# Patient Record
Sex: MALE | Race: WHITE | NOT HISPANIC OR LATINO | Employment: OTHER | URBAN - METROPOLITAN AREA
[De-identification: names, ages, dates, MRNs, and addresses within clinical notes are randomized per-mention and may not be internally consistent; named-entity substitution may affect disease eponyms.]

---

## 2017-01-20 ENCOUNTER — APPOINTMENT (OUTPATIENT)
Dept: LAB | Facility: CLINIC | Age: 67
End: 2017-01-20
Payer: MEDICARE

## 2017-01-20 ENCOUNTER — TRANSCRIBE ORDERS (OUTPATIENT)
Dept: LAB | Facility: CLINIC | Age: 67
End: 2017-01-20

## 2017-01-20 DIAGNOSIS — R73.01 IMPAIRED FASTING GLUCOSE: ICD-10-CM

## 2017-01-20 DIAGNOSIS — I10 ESSENTIAL (PRIMARY) HYPERTENSION: ICD-10-CM

## 2017-01-20 DIAGNOSIS — E78.5 HYPERLIPIDEMIA: ICD-10-CM

## 2017-01-20 LAB
ALBUMIN SERPL BCP-MCNC: 3.7 G/DL (ref 3.5–5)
ALP SERPL-CCNC: 65 U/L (ref 46–116)
ALT SERPL W P-5'-P-CCNC: 72 U/L (ref 12–78)
ANION GAP SERPL CALCULATED.3IONS-SCNC: 9 MMOL/L (ref 4–13)
AST SERPL W P-5'-P-CCNC: 35 U/L (ref 5–45)
BASOPHILS # BLD AUTO: 0.06 THOUSANDS/ΜL (ref 0–0.1)
BASOPHILS NFR BLD AUTO: 1 % (ref 0–1)
BILIRUB DIRECT SERPL-MCNC: 0.2 MG/DL (ref 0–0.2)
BILIRUB SERPL-MCNC: 1.04 MG/DL (ref 0.2–1)
BUN SERPL-MCNC: 13 MG/DL (ref 5–25)
CALCIUM SERPL-MCNC: 8.8 MG/DL (ref 8.3–10.1)
CHLORIDE SERPL-SCNC: 105 MMOL/L (ref 100–108)
CHOLEST SERPL-MCNC: 172 MG/DL (ref 50–200)
CO2 SERPL-SCNC: 23 MMOL/L (ref 21–32)
CREAT SERPL-MCNC: 0.89 MG/DL (ref 0.6–1.3)
EOSINOPHIL # BLD AUTO: 0.32 THOUSAND/ΜL (ref 0–0.61)
EOSINOPHIL NFR BLD AUTO: 6 % (ref 0–6)
ERYTHROCYTE [DISTWIDTH] IN BLOOD BY AUTOMATED COUNT: 12.7 % (ref 11.6–15.1)
EST. AVERAGE GLUCOSE BLD GHB EST-MCNC: 108 MG/DL
GFR SERPL CREATININE-BSD FRML MDRD: >60 ML/MIN/1.73SQ M
GLUCOSE SERPL-MCNC: 93 MG/DL (ref 65–140)
HBA1C MFR BLD: 5.4 % (ref 4.2–6.3)
HCT VFR BLD AUTO: 46.4 % (ref 36.5–49.3)
HDLC SERPL-MCNC: 44 MG/DL (ref 40–60)
HGB BLD-MCNC: 16.8 G/DL (ref 12–17)
LDLC SERPL CALC-MCNC: 98 MG/DL (ref 0–100)
LYMPHOCYTES # BLD AUTO: 1.55 THOUSANDS/ΜL (ref 0.6–4.47)
LYMPHOCYTES NFR BLD AUTO: 28 % (ref 14–44)
MCH RBC QN AUTO: 32.2 PG (ref 26.8–34.3)
MCHC RBC AUTO-ENTMCNC: 36.2 G/DL (ref 31.4–37.4)
MCV RBC AUTO: 89 FL (ref 82–98)
MONOCYTES # BLD AUTO: 0.79 THOUSAND/ΜL (ref 0.17–1.22)
MONOCYTES NFR BLD AUTO: 14 % (ref 4–12)
NEUTROPHILS # BLD AUTO: 2.76 THOUSANDS/ΜL (ref 1.85–7.62)
NEUTS SEG NFR BLD AUTO: 51 % (ref 43–75)
NRBC BLD AUTO-RTO: 0 /100 WBCS
PLATELET # BLD AUTO: 181 THOUSANDS/UL (ref 149–390)
PMV BLD AUTO: 10.8 FL (ref 8.9–12.7)
POTASSIUM SERPL-SCNC: 4.1 MMOL/L (ref 3.5–5.3)
PROT SERPL-MCNC: 7.3 G/DL (ref 6.4–8.2)
RBC # BLD AUTO: 5.22 MILLION/UL (ref 3.88–5.62)
SODIUM SERPL-SCNC: 137 MMOL/L (ref 136–145)
TRIGL SERPL-MCNC: 151 MG/DL
WBC # BLD AUTO: 5.49 THOUSAND/UL (ref 4.31–10.16)

## 2017-01-20 PROCEDURE — 80048 BASIC METABOLIC PNL TOTAL CA: CPT

## 2017-01-20 PROCEDURE — 80061 LIPID PANEL: CPT

## 2017-01-20 PROCEDURE — 85025 COMPLETE CBC W/AUTO DIFF WBC: CPT

## 2017-01-20 PROCEDURE — 36415 COLL VENOUS BLD VENIPUNCTURE: CPT

## 2017-01-20 PROCEDURE — 83036 HEMOGLOBIN GLYCOSYLATED A1C: CPT

## 2017-01-20 PROCEDURE — 80076 HEPATIC FUNCTION PANEL: CPT

## 2017-01-24 ENCOUNTER — ALLSCRIPTS OFFICE VISIT (OUTPATIENT)
Dept: OTHER | Facility: OTHER | Age: 67
End: 2017-01-24

## 2017-01-24 DIAGNOSIS — R31.0 GROSS HEMATURIA: ICD-10-CM

## 2017-01-24 DIAGNOSIS — L98.9 DISORDER OF SKIN OR SUBCUTANEOUS TISSUE: ICD-10-CM

## 2017-02-03 ENCOUNTER — HOSPITAL ENCOUNTER (OUTPATIENT)
Dept: ULTRASOUND IMAGING | Facility: HOSPITAL | Age: 67
Discharge: HOME | End: 2017-02-03
Attending: INTERNAL MEDICINE
Payer: MEDICARE

## 2017-02-03 DIAGNOSIS — R31.0 GROSS HEMATURIA: ICD-10-CM

## 2017-02-03 PROCEDURE — 76770 US EXAM ABDO BACK WALL COMP: CPT

## 2017-02-07 ENCOUNTER — ALLSCRIPTS OFFICE VISIT (OUTPATIENT)
Dept: OTHER | Facility: OTHER | Age: 67
End: 2017-02-07

## 2017-02-14 ENCOUNTER — APPOINTMENT (OUTPATIENT)
Dept: LAB | Facility: CLINIC | Age: 67
End: 2017-02-14
Payer: MEDICARE

## 2017-02-14 DIAGNOSIS — R31.0 GROSS HEMATURIA: ICD-10-CM

## 2017-02-14 LAB
BILIRUB UR QL STRIP: NEGATIVE
CLARITY UR: CLEAR
COLOR UR: YELLOW
GLUCOSE UR STRIP-MCNC: NEGATIVE MG/DL
HGB UR QL STRIP.AUTO: NEGATIVE
KETONES UR STRIP-MCNC: NEGATIVE MG/DL
LEUKOCYTE ESTERASE UR QL STRIP: NEGATIVE
NITRITE UR QL STRIP: NEGATIVE
PH UR STRIP.AUTO: 6 [PH] (ref 4.5–8)
PROT UR STRIP-MCNC: NEGATIVE MG/DL
SP GR UR STRIP.AUTO: 1.01 (ref 1–1.03)
UROBILINOGEN UR QL STRIP.AUTO: 0.2 E.U./DL

## 2017-02-14 PROCEDURE — 81003 URINALYSIS AUTO W/O SCOPE: CPT

## 2017-03-13 ENCOUNTER — ALLSCRIPTS OFFICE VISIT (OUTPATIENT)
Dept: OTHER | Facility: OTHER | Age: 67
End: 2017-03-13

## 2017-07-01 DIAGNOSIS — E78.5 HYPERLIPIDEMIA: ICD-10-CM

## 2017-07-01 DIAGNOSIS — R73.01 IMPAIRED FASTING GLUCOSE: ICD-10-CM

## 2017-07-01 DIAGNOSIS — I10 ESSENTIAL (PRIMARY) HYPERTENSION: ICD-10-CM

## 2017-07-07 ENCOUNTER — APPOINTMENT (OUTPATIENT)
Dept: LAB | Facility: CLINIC | Age: 67
End: 2017-07-07
Payer: MEDICARE

## 2017-07-07 ENCOUNTER — TRANSCRIBE ORDERS (OUTPATIENT)
Dept: LAB | Facility: CLINIC | Age: 67
End: 2017-07-07

## 2017-07-07 DIAGNOSIS — I10 ESSENTIAL (PRIMARY) HYPERTENSION: ICD-10-CM

## 2017-07-07 DIAGNOSIS — E78.5 HYPERLIPIDEMIA: ICD-10-CM

## 2017-07-07 DIAGNOSIS — R73.01 IMPAIRED FASTING GLUCOSE: ICD-10-CM

## 2017-07-07 LAB
ALBUMIN SERPL BCP-MCNC: 3.5 G/DL (ref 3.5–5)
ALP SERPL-CCNC: 65 U/L (ref 46–116)
ALT SERPL W P-5'-P-CCNC: 60 U/L (ref 12–78)
ANION GAP SERPL CALCULATED.3IONS-SCNC: 4 MMOL/L (ref 4–13)
AST SERPL W P-5'-P-CCNC: 38 U/L (ref 5–45)
BASOPHILS # BLD AUTO: 0.05 THOUSANDS/ΜL (ref 0–0.1)
BASOPHILS NFR BLD AUTO: 1 % (ref 0–1)
BILIRUB DIRECT SERPL-MCNC: 0.31 MG/DL (ref 0–0.2)
BILIRUB SERPL-MCNC: 1.25 MG/DL (ref 0.2–1)
BUN SERPL-MCNC: 13 MG/DL (ref 5–25)
CALCIUM SERPL-MCNC: 8.6 MG/DL (ref 8.3–10.1)
CHLORIDE SERPL-SCNC: 108 MMOL/L (ref 100–108)
CHOLEST SERPL-MCNC: 148 MG/DL (ref 50–200)
CO2 SERPL-SCNC: 26 MMOL/L (ref 21–32)
CREAT SERPL-MCNC: 0.89 MG/DL (ref 0.6–1.3)
EOSINOPHIL # BLD AUTO: 0.44 THOUSAND/ΜL (ref 0–0.61)
EOSINOPHIL NFR BLD AUTO: 8 % (ref 0–6)
ERYTHROCYTE [DISTWIDTH] IN BLOOD BY AUTOMATED COUNT: 12.9 % (ref 11.6–15.1)
EST. AVERAGE GLUCOSE BLD GHB EST-MCNC: 103 MG/DL
GFR SERPL CREATININE-BSD FRML MDRD: >60 ML/MIN/1.73SQ M
GLUCOSE P FAST SERPL-MCNC: 88 MG/DL (ref 65–99)
HBA1C MFR BLD: 5.2 % (ref 4.2–6.3)
HCT VFR BLD AUTO: 44.8 % (ref 36.5–49.3)
HDLC SERPL-MCNC: 44 MG/DL (ref 40–60)
HGB BLD-MCNC: 16.1 G/DL (ref 12–17)
LDLC SERPL CALC-MCNC: 81 MG/DL (ref 0–100)
LYMPHOCYTES # BLD AUTO: 1.8 THOUSANDS/ΜL (ref 0.6–4.47)
LYMPHOCYTES NFR BLD AUTO: 34 % (ref 14–44)
MCH RBC QN AUTO: 31.9 PG (ref 26.8–34.3)
MCHC RBC AUTO-ENTMCNC: 35.9 G/DL (ref 31.4–37.4)
MCV RBC AUTO: 89 FL (ref 82–98)
MONOCYTES # BLD AUTO: 0.71 THOUSAND/ΜL (ref 0.17–1.22)
MONOCYTES NFR BLD AUTO: 13 % (ref 4–12)
NEUTROPHILS # BLD AUTO: 2.34 THOUSANDS/ΜL (ref 1.85–7.62)
NEUTS SEG NFR BLD AUTO: 44 % (ref 43–75)
NRBC BLD AUTO-RTO: 0 /100 WBCS
PLATELET # BLD AUTO: 149 THOUSANDS/UL (ref 149–390)
PMV BLD AUTO: 10 FL (ref 8.9–12.7)
POTASSIUM SERPL-SCNC: 4.4 MMOL/L (ref 3.5–5.3)
PROT SERPL-MCNC: 6.7 G/DL (ref 6.4–8.2)
RBC # BLD AUTO: 5.04 MILLION/UL (ref 3.88–5.62)
SODIUM SERPL-SCNC: 138 MMOL/L (ref 136–145)
TRIGL SERPL-MCNC: 114 MG/DL
WBC # BLD AUTO: 5.35 THOUSAND/UL (ref 4.31–10.16)

## 2017-07-07 PROCEDURE — 80061 LIPID PANEL: CPT

## 2017-07-07 PROCEDURE — 80076 HEPATIC FUNCTION PANEL: CPT

## 2017-07-07 PROCEDURE — 83036 HEMOGLOBIN GLYCOSYLATED A1C: CPT

## 2017-07-07 PROCEDURE — 80048 BASIC METABOLIC PNL TOTAL CA: CPT

## 2017-07-07 PROCEDURE — 36415 COLL VENOUS BLD VENIPUNCTURE: CPT

## 2017-07-07 PROCEDURE — 85025 COMPLETE CBC W/AUTO DIFF WBC: CPT

## 2017-07-10 ENCOUNTER — ALLSCRIPTS OFFICE VISIT (OUTPATIENT)
Dept: OTHER | Facility: OTHER | Age: 67
End: 2017-07-10

## 2018-01-01 DIAGNOSIS — E78.5 HYPERLIPIDEMIA: ICD-10-CM

## 2018-01-01 DIAGNOSIS — I10 ESSENTIAL (PRIMARY) HYPERTENSION: ICD-10-CM

## 2018-01-01 DIAGNOSIS — Z12.5 ENCOUNTER FOR SCREENING FOR MALIGNANT NEOPLASM OF PROSTATE: ICD-10-CM

## 2018-01-01 DIAGNOSIS — R73.01 IMPAIRED FASTING GLUCOSE: ICD-10-CM

## 2018-01-09 NOTE — PROGRESS NOTES
Assessment    1  Seborrheic keratosis (702 19) (L82 1)   2  Screening for skin condition (V82 0) (Z13 89)   3  H/O nonmelanoma skin cancer (V10 83) (Z85 828)    Plan    · Follow-up visit in 1 year Evaluation and Treatment  Follow-up  Status: Hold For -  Scheduling  Requested for: 49VTL1221    · Use a sun block product with an SPF of 15 or more ; Status:Complete;   Done:  08SRD4339 03:30PM    Discussion/Summary  Discussion Summary- [facility]:   Assessment #1: Seborrheic keratosis  Care Plan:   Patient reassured these are normal growths we acquire with age no treatment needed  Assessment #2: History skin cancer  Care Plan:   No recurrence nothing else atypical noted sunblock recommended followup in 1 year  Assessment #3: Screening for dermatologic disorders  Care Plan:   Nothing else noted on complete exam followup in 1 year  Chief Complaint  Chief Complaint Free Text Note Form: 6 MONTH SKIN CANCER EXAM      History of Present Illness  HPI: 71-year-old male presents for followup concerns regarding previous history of skin cancer No specific cutaneous problems noted      Review of Systems  Complete Male Dermatology [facility] Patient:   Constitutional: Denies constitutional symptoms  Eyes: Denies eye symptoms  ENT:  denies ear symptoms, nasal symptoms, mouth or throat symptoms  Cardiovascular: Denies cardiovascular symptoms  Respiratory: Denies respiratory symptoms  Gastrointestinal: Denies gastrointestinal symptoms  Musculoskeletal: Denies musculoskeletal symptoms  Integumentary: Denies skin, hair and nail symptoms  Neurological: Denies neurologic symptoms  Psychiatric: Denies psychiatric symptoms  Endocrine: Denies endocrine symptoms  Hematologic/Lymphatic: Denies hematologic symptoms  Active Problems    1  Actinic keratosis (702 0) (L57 0)   2  Atherosclerotic heart disease of native coronary artery without angina pectoris (414 01)   (I25 10)   3   Coronary arteriosclerosis (414 00) (I25 10)   4  Encounter for monitoring antiplatelet therapy (V91 18,E10 60) (Z51 81,Z79 02)   5  Esophageal reflux (530 81) (K21 9)   6  Essential hypertension (401 9) (I10)   7  Hand numbness (782 0) (R20 0)   8  Hyperlipidemia (272 4) (E78 5)   9  Impaired fasting glucose (790 21) (R73 01)   10  Leg cramping (729 82) (R25 2)   11  Mitral valve disorder (424 0) (I05 9)   12  Need for influenza vaccination (V04 81) (Z23)   13  Need for zoster vaccine (V04 89) (Z23)   14  Prostate cancer screening (V76 44) (Z12 5)   15  Screening for skin condition (V82 0) (Z13 89)   16  Seborrheic keratosis (702 19) (L82 1)   17  Secondary polycythemia (289 0) (D75 1)   18  Syncope (780 2) (R55)    Past Medical History    1  History of Abnormal Liver Function Test (790 6)   2  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)   3  History of Encounter for screening for malignant neoplasm of colon (V76 51) (Z12 11)   4  H/O nonmelanoma skin cancer (V10 83) (Z85 828)   5  History of angina pectoris (V12 59) (Z86 79)   6  History of benign neoplasm of skin (V13 3) (Z87 2)   7  History of sebaceous cyst (V13 3) (Z87 2)   8  History of shortness of breath (V13 89) (Z87 898)   9  History of Lumbar Canal Stenosis With Neurogenic Claudication (724 03)   10  History of Need for influenza vaccination (V04 81) (Z23)   11  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) (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 0)   12  Old myocardial infarction (412) (I25 2)   13  History of Skin Cancer (V10 83)   14  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 (173 52)  Past Medical History Reviewed- Derm:   The past medical history was reviewed  Surgical History    1  History of Cardiac Cath Procedure Outcome: Successful   2  History of Cath Atherectomy 1 With Stent Placement   3  History of Cath Stent Placement   4  History of Excision Of Lesion Trunk Malignant   5   History of PTCA  Surgical History Reviewed [facility]: Surgical History reviewed      Family History    1  Family history of Acute Myocardial Infarction (V17 3)   2  Family history of Father  At Age 59    4  Family history of coronary artery disease (V17 3) (Z82 49)  Family History Reviewed- Derm:   Family History was reviewed      Social History    · Denied: History of Drug Use   · Home DME CPAP   · Living Independently With Spouse   · Never A Smoker   · Occupation: Retired   · Wine Consumption (___ Glasses/Day)  Social History [facility]ed [facility]: The social history was reviewed      Current Meds   1  Aspirin 81 MG Oral Tablet; Therapy: (Recorded:2015) to Recorded   2  Atorvastatin Calcium 20 MG Oral Tablet; take 1 tablet every day; Therapy: 70VZQ5188 to (Eladio Parks)  Requested for: 61NPA2078; Last   Rx:2015 Ordered   3  Clopidogrel Bisulfate 75 MG Oral Tablet; take 1 tablet every day; Therapy: 41IIV1915 to (Evaluate:2016)  Requested for: 45RJK2476; Last   Rx:2015 Ordered   4  Lisinopril 10 MG Oral Tablet; 1 tablet daily; Therapy: 83SYF4355 to (Abel Sales)  Requested for: 88WPL8976; Last   Rx:59Ouq3118 Ordered   5  Metoprolol Succinate ER 50 MG Oral Tablet Extended Release 24 Hour; take 1 tablet by   mouth every day; Therapy: 04Atk6102 to (Evaluate:95Qij9026)  Requested for: 69Vwo4414; Last   Rx:45Xjd5353 Ordered   6  Nitrostat 0 4 MG Sublingual Tablet Sublingual; DISSOLVE 1 TABLET UNDER THE   TONGUE AS NEEDED FOR CHEST PAIN;   Therapy: 90GFR3811 to (Evaluate:26Gtl7059)  Requested for: 26YUT7948; Last   Rx:2015 Ordered   7  Tylenol Extra Strength 500 MG Oral Tablet; TAKE TABLET  PRN; Therapy: 16Zug6960 to Recorded   8  Zostavax 69918 UNT/0 65ML Subcutaneous Solution Reconstituted; Inject one dose; Therapy: 09YHC6206 to (Last AL:89YIT3401) Ordered  Medication List Reviewed: The medication list was reviewed and updated today  Allergies    1   No Known Drug Allergies    Physical Exam    Constitutional   General appearance: Appears healthy and well developed  Lymphatic   No visible disturbance  Musculoskeletal   Digits and nails: No clubbing, cyanosis or edema  Cutaneous and nail exam normal     Skin   Scalp skin texture and hair distribution: Normal skin texture on scalp, normal hair distribution  Head: Normal turgor, no rashes, no lesions  Neck: Normal turgor, no rashes, no lesions  Chest: Normal turgor, no rashes, no lesions  Abdomen: Normal turgor, no rashes, no lesions  Back: Normal turgor, no rashes, no lesions  Right upper extremity: Normal turgor, no rashes, no lesions  Left upper extremity: Normal turgor, no rashes, no lesions  Right lower extremity: Normal turgor, no rashes, no lesions  Left lower extremity: Normal turgor, no rashes, no lesions  Neuro/Psych   Alert and oriented x 3  Displays comfort and cooperation during encounterl  Affect is normal     Finding normal keratotic papules with greasy stuck on appearance nothing else atypical noted on complete cutaneous exam previous sites of skin cancer well-healed without recurrence  Health Management  Health Maintenance   COLONOSCOPY; every 10 years; Next Due: 87CBR9049; Overdue  Influenza (Split); every 1 year; Last 29HKV2002; Next Due: 84RRB2245; Overdue    Future Appointments    Date/Time Provider Specialty Site   04/11/2016 03:00 PM JERSEY Lyman  Cardiology [facility]   05/31/2016 03:00 PM JERSEY Moctezuma   Internal Medicine [facility]  2  CT     Signatures   Electronically signed by : JERSEY Bloom ; Feb  3 2016  3:41PM EST                       (Author)

## 2018-01-10 NOTE — RESULT NOTES
Verified Results  ECHO STRESS TEST W CONTRAST IF INDICATED 2016 10:49AM Cinthia Clarke Order Number: [id number]     Test Name Result Flag Reference   ECHO STRESS TEST W CONTRAST IF INDICATED (Report)     Donna [address]   [phone]     Exercise Stress Echocardiography     Study date: 2016     Patient: Padmini Jefferson   MR number: [id number]   Account number: [de-identified]   : 1950   Age: 60 years   Gender: Male   Study date: 2016   Status: Outpatient   [facility]   Height: 70 in   Weight: 235 lb   BP: 134// 98 mmHg     Indications: Evaluation of known coronary artery disease  Diagnosis: I25 10 - Atherosclerotic heart disease of native coronary artery   without angina pectoris, R06 09 - Other forms of dyspnea     Sonographer: Elizabeth RCS   Primary Physician: Yazan Estevez MD   Referring Physician: Onel Romero MD   Group: [facility]   Other: Jazzy Lemus MS, CCT   Interpreting Physician: Onel Romero MD     IMPRESSIONS:   Normal study  Left ventricular systolic function was normal      SUMMARY     STRESS RESULTS:   Duration of exercise was 9 min  Maximal work rate was 9 METs  Maximal heart rate during stress was 140 bpm ( 90 % of maximal predicted heart   rate)  Target heart rate was achieved  There was no chest pain during stress  ECG CONCLUSIONS:   The stress ECG was negative for ischemia  BASELINE:   There were no regional wall motion abnormalities  Estimated left ventricular ejection fraction was 55 %   PEAK STRESS:   There were no regional wall motion abnormalities  ECHO CONCLUSIONS:   There was no diagnostic evidence for stress-induced ischemia  HISTORY: The patient is a 60year old  male  Chest pain status: no   chest pain  Other symptoms: dyspnea   Coronary artery disease risk factors: dyslipidemia, hypertension, and family history of coronary artery disease  Cardiovascular history: coronary artery disease and prior myocardial infarction   (April 2000 and Jan 2007) Prior cardiovascular procedures: angioplasty of left   anterior descending and first diagonal (date unknown) and angioplasty of right   coronary artery (on 4/26/10)  Co-morbidity: obesity  GERD, ERIC Medications: a   beta blocker, an ACE inhibitor/ARB, clopidogrel, and a lipid lowering agent  PHYSICAL EXAM: Baseline physical exam screening: normal      REST ECG: Normal sinus rhythm  Normal baseline ECG  PROCEDURE: The study was performed in the stress lab  The study was performed   in the [address] [place] [address]  Treadmill exercise testing was   performed, using the Francisca protocol  Stress and rest echocardiographic   evaluation with 2D imaging was performed from multiple acoustic windows for   evaluation of ventricular function  FRANCISCA PROTOCOL:   HR bpm SBP mmHg DBP mmHg Symptoms ST change Rhythm/conduct   Baseline 59 134 98 none nonspecific ST abnormality NSR, rare PVC's   Stage 1 93 148 86 none none NSR, rare PVC's   Stage 2 115 152 86 mild dyspnea, mild fatigue none NSR, rare PAC's, rare PVC's   Stage 3 140 190 82 moderate dyspnea, moderate fatigue none NSR, rare PAC's,   frequent PVC's   Immediate 140 -- -- same as above none NSR, no ectopy   Recovery 1 107 -- -- subsiding upsloping depression, less than 0 5 mm in II,   III, aVF, V4, V5, V6 NSR, occasional PAC's, rare PVC's   Recovery 2 77 164 94 none same as above NSR, rare PAC's   Recovery 3 75 -- -- none same as above NSR, rare PAC's, rare PVC's   Recovery 5 80 152 94 none upsloping depression, less than 0 5 mm in II NSR,   occasional PVC's     STRESS RESULTS: Duration of exercise was 9 min  The patient exercised to   protocol stage 3  Maximal work rate was 9 METs  Maximal heart rate during   stress was 140 bpm ( 90 % of maximal predicted heart rate)   Target heart rate   was achieved  Maximal systolic blood pressure during stress was 190 mmHg  The   rate-pressure product for the peak heart rate and blood pressure was 96708  There was no chest pain during stress  The stress test was terminated due to   achievement of target heart rate, PVCs, moderate dyspnea, and moderate fatigue  ECG CONCLUSIONS: The stress ECG was negative for ischemia  Arrhythmia during   stress: isolated atrial premature beats and isolated premature ventricular   beats  STRESS 2D ECHO RESULTS:     BASELINE: There were no regional wall motion abnormalities  Left ventricular   size was normal  Overall left ventricular systolic function was normal    Estimated left ventricular ejection fraction was 55 %   PEAK STRESS: There were no regional wall motion abnormalities  There was an   appropriate reduction in left ventricular size  There was an appropriate   augmentation in LV function  ECHO CONCLUSIONS: There was no diagnostic evidence for stress-induced ischemia  The image quality was poor       Prepared and electronically signed by     Gio Zimmerman MD   Signed 26-Apr-2016 17:15:07

## 2018-01-11 ENCOUNTER — ALLSCRIPTS OFFICE VISIT (OUTPATIENT)
Dept: OTHER | Facility: OTHER | Age: 68
End: 2018-01-11

## 2018-01-11 ENCOUNTER — APPOINTMENT (OUTPATIENT)
Dept: LAB | Facility: CLINIC | Age: 68
End: 2018-01-11
Payer: MEDICARE

## 2018-01-11 DIAGNOSIS — I10 ESSENTIAL (PRIMARY) HYPERTENSION: ICD-10-CM

## 2018-01-11 DIAGNOSIS — Z12.5 ENCOUNTER FOR SCREENING FOR MALIGNANT NEOPLASM OF PROSTATE: ICD-10-CM

## 2018-01-11 DIAGNOSIS — R73.01 IMPAIRED FASTING GLUCOSE: ICD-10-CM

## 2018-01-11 DIAGNOSIS — E78.5 HYPERLIPIDEMIA: ICD-10-CM

## 2018-01-11 LAB
ALBUMIN SERPL BCP-MCNC: 3.3 G/DL (ref 3.5–5)
ALP SERPL-CCNC: 61 U/L (ref 46–116)
ALT SERPL W P-5'-P-CCNC: 36 U/L (ref 12–78)
ANION GAP SERPL CALCULATED.3IONS-SCNC: 6 MMOL/L (ref 4–13)
AST SERPL W P-5'-P-CCNC: 27 U/L (ref 5–45)
BASOPHILS # BLD AUTO: 0.06 THOUSANDS/ΜL (ref 0–0.1)
BASOPHILS NFR BLD AUTO: 1 % (ref 0–1)
BILIRUB DIRECT SERPL-MCNC: 0.18 MG/DL (ref 0–0.2)
BILIRUB SERPL-MCNC: 0.75 MG/DL (ref 0.2–1)
BUN SERPL-MCNC: 17 MG/DL (ref 5–25)
CALCIUM SERPL-MCNC: 8.7 MG/DL (ref 8.3–10.1)
CHLORIDE SERPL-SCNC: 107 MMOL/L (ref 100–108)
CHOLEST SERPL-MCNC: 119 MG/DL (ref 50–200)
CO2 SERPL-SCNC: 24 MMOL/L (ref 21–32)
CREAT SERPL-MCNC: 0.81 MG/DL (ref 0.6–1.3)
EOSINOPHIL # BLD AUTO: 0.22 THOUSAND/ΜL (ref 0–0.61)
EOSINOPHIL NFR BLD AUTO: 4 % (ref 0–6)
ERYTHROCYTE [DISTWIDTH] IN BLOOD BY AUTOMATED COUNT: 12.1 % (ref 11.6–15.1)
EST. AVERAGE GLUCOSE BLD GHB EST-MCNC: 105 MG/DL
GFR SERPL CREATININE-BSD FRML MDRD: 93 ML/MIN/1.73SQ M
GLUCOSE P FAST SERPL-MCNC: 102 MG/DL (ref 65–99)
HBA1C MFR BLD: 5.3 % (ref 4.2–6.3)
HCT VFR BLD AUTO: 43.7 % (ref 36.5–49.3)
HDLC SERPL-MCNC: 37 MG/DL (ref 40–60)
HGB BLD-MCNC: 15.9 G/DL (ref 12–17)
LDLC SERPL CALC-MCNC: 64 MG/DL (ref 0–100)
LYMPHOCYTES # BLD AUTO: 1.62 THOUSANDS/ΜL (ref 0.6–4.47)
LYMPHOCYTES NFR BLD AUTO: 28 % (ref 14–44)
MCH RBC QN AUTO: 32.1 PG (ref 26.8–34.3)
MCHC RBC AUTO-ENTMCNC: 36.4 G/DL (ref 31.4–37.4)
MCV RBC AUTO: 88 FL (ref 82–98)
MONOCYTES # BLD AUTO: 0.74 THOUSAND/ΜL (ref 0.17–1.22)
MONOCYTES NFR BLD AUTO: 13 % (ref 4–12)
NEUTROPHILS # BLD AUTO: 3.22 THOUSANDS/ΜL (ref 1.85–7.62)
NEUTS SEG NFR BLD AUTO: 54 % (ref 43–75)
NRBC BLD AUTO-RTO: 0 /100 WBCS
PLATELET # BLD AUTO: 278 THOUSANDS/UL (ref 149–390)
PMV BLD AUTO: 9.4 FL (ref 8.9–12.7)
POTASSIUM SERPL-SCNC: 4.2 MMOL/L (ref 3.5–5.3)
PROT SERPL-MCNC: 7.3 G/DL (ref 6.4–8.2)
PSA SERPL-MCNC: 0.8 NG/ML (ref 0–4)
RBC # BLD AUTO: 4.96 MILLION/UL (ref 3.88–5.62)
SODIUM SERPL-SCNC: 137 MMOL/L (ref 136–145)
TRIGL SERPL-MCNC: 91 MG/DL
WBC # BLD AUTO: 5.89 THOUSAND/UL (ref 4.31–10.16)

## 2018-01-11 PROCEDURE — G0103 PSA SCREENING: HCPCS

## 2018-01-11 PROCEDURE — 83036 HEMOGLOBIN GLYCOSYLATED A1C: CPT

## 2018-01-11 PROCEDURE — 36415 COLL VENOUS BLD VENIPUNCTURE: CPT

## 2018-01-11 PROCEDURE — 80048 BASIC METABOLIC PNL TOTAL CA: CPT

## 2018-01-11 PROCEDURE — 80076 HEPATIC FUNCTION PANEL: CPT

## 2018-01-11 PROCEDURE — 80061 LIPID PANEL: CPT

## 2018-01-11 PROCEDURE — 85025 COMPLETE CBC W/AUTO DIFF WBC: CPT

## 2018-01-12 NOTE — PROGRESS NOTES
Assessment   1  Actinic keratosis (702 0) (L57 0)   2  Screening for skin condition (V82 0) (Z13 89)   3  Seborrheic keratosis (702 19) (L82 1)   4  H/O nonmelanoma skin cancer (V10 83) (Z85 828)    Plan    · Wound care as instructed ; Status:Complete;   Done: 93GHK1903   · Follow-up visit in 6 months Evaluation and Treatment  Follow-up  Status: Hold For -    Scheduling  Requested for: 41PYY6187   · Use a sun block product with an SPF of 15 or more ; Status:Complete;   Done:    22ITG7321    Discussion/Summary   Discussion Summary- [facility]:      Assessment #1: Seborrheic keratosis  Care Plan:      Patient reassured these are normal growths we acquire with age no treatment needed  Assessment #2: History skin cancer  Care Plan:      No recurrence nothing else atypical noted sunblock recommended followup in 6 months  Assessment #3: Screening for dermatologic disorders  Care Plan:      Nothing else noted on complete exam followup in 6 months  Assessment #4: Actinic keratosis  Care Plan:      Patient advised lesions are precancers should resolve with cryosurgery if not to let us know sunblock recommended  Chief Complaint   Chief Complaint Free Text Note Form: yearly full body skin cancer exam      History of Present Illness   HPI: 78 y o male presents for follow-up for previous history of skin cancer as well as actinic keratosis notes some scaling areas on his face  No other specific concerns noted      Review of Systems   Complete Male Dermatology [facility] Patient:      Constitutional: Denies constitutional symptoms  Eyes: Denies eye symptoms  ENT:  denies ear symptoms, nasal symptoms, mouth or throat symptoms  Cardiovascular: Denies cardiovascular symptoms  Respiratory: Denies respiratory symptoms  Gastrointestinal: Denies gastrointestinal symptoms  Musculoskeletal: Denies musculoskeletal symptoms        Integumentary: Denies skin, hair and nail symptoms  Neurological: Denies neurologic symptoms  Psychiatric: Denies psychiatric symptoms  Endocrine: Denies endocrine symptoms  Hematologic/Lymphatic: Denies hematologic symptoms  Active Problems   1  Actinic keratosis (702 0) (L57 0)   2  Advanced directives, counseling/discussion (V65 49) (Z71 89)   3  Atherosclerotic heart disease of native coronary artery without angina pectoris (414 01)     (I25 10)   4  Changing skin lesion (709 9) (L98 9)   5  Coronary arteriosclerosis (414 00) (I25 10)   6  Dyspnea on effort (786 09) (R06 09)   7  Elevated bilirubin (277 4) (R17)   8  Encounter for monitoring antiplatelet therapy (T73 23,G55 92) (Z51 81,Z79 02)   9  Essential hypertension (401 9) (I10)   10  GERD without esophagitis (530 81) (K21 9)   11  Hand numbness (782 0) (R20 0)   12  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)   13  Hyperlipidemia (272 4) (E78 5)   14  Impaired fasting glucose (790 21) (R73 01)   15  Lateral epicondylitis of left elbow (726 32) (M77 12)   16  Leg cramping (729 82) (R25 2)   17  Mitral valve disorder (424 0) (I05 9)   18  Need for influenza vaccination (V04 81) (Z23)   19  Need for pneumococcal vaccine (V03 82) (Z23)   20  Need for zoster vaccine (V04 89) (Z23)   21  No advance directives (V49 89) (Z78 9)   22  Prostate cancer screening (V76 44) (Z12 5)   23  Screening for genitourinary condition (V81 6) (Z13 89)   24  Screening for skin condition (V82 0) (Z13 89)   25  Seborrheic keratosis (702 19) (L82 1)   26  Secondary polycythemia (289 0) (D75 1)   27  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)   28  Squamous cell carcinoma in situ of skin of left cheek (232 3) (D04 39)   29  Squamous cell carcinoma of skin of neck (173 42) (C44 42)   30  Wellness examination (V70 0) (Z00 00)    Past Medical History   1  History of Abnormal Liver Function Test (790 6)   2  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)   3   History of Encounter for screening for malignant neoplasm of colon (V76 51) (Z12 11)   4  H/O nonmelanoma skin cancer (V10 83) (Z85 828)   5  History of angina pectoris (V12 59) (Z86 79)   6  History of benign neoplasm of skin (V13 3) (Z87 2)   7  History of sebaceous cyst (V13 3) (Z87 2)   8  History of shortness of breath (V13 89) (Z87 898)   9  History of Lumbar Canal Stenosis With Neurogenic Claudication (724 03)   10  History of Need for influenza vaccination (V04 81) (Z23)   11  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) (N04 0)   12  Old myocardial infarction (412) (I25 2)   13  History of Skin Cancer (V10 83)   14  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 (173 52)  Past Medical History Reviewed- Derm:    The past medical history was reviewed  Surgical History   1  History of Cardiac Cath Procedure Outcome: Successful   2  History of Cath Atherectomy 1 With Stent Placement   3  History of Cath Stent Placement   4  History of Excision Of Lesion Trunk Malignant   5  History of PTCA  Surgical History Reviewed [employer]:    Surgical History reviewed      Family History   Father    1  Family history of Acute Myocardial Infarction (V17 3)   2  Family history of Father  At Age 59  Family History    3  Family history of coronary artery disease (V17 3) (Z82 49)  Family History Reviewed- Derm:    Family History was reviewed      Social History    · Denied: History of Drug Use   · Home DME CPAP   · Living Independently With Spouse   · Never a smoker   · No advance directives (V49 89) (Z78 9)   · Occupation: Retired   · Wine Consumption (___ Glasses/Day)  Social History [facility]ed [facility]: The social history was reviewed      Current Meds    1  Aspirin 81 MG TABS; Therapy: (Recorded:2015) to Recorded   2  Atorvastatin Calcium 20 MG Oral Tablet; take 1 tablet every day;      Therapy: 76OND4094 to (Evaluate:2018)  Requested for: 55PLT9064; Last Rx: 88XPN5274 Ordered   3  Clopidogrel Bisulfate 75 MG Oral Tablet; take 1 tablet every day; Therapy: 23NOV2657 to ([facility])  Requested for: 08Apr2017; Last     Rx:08Apr2017 Ordered   4  Lisinopril 10 MG Oral Tablet; take 1 tablet by mouth every day; Therapy: 47WGI2124 to (Evaluate:11Jun2018)  Requested for: 53Sot7271; Last     Rx:78Xpl7676 Ordered   5  Metoprolol Succinate ER 50 MG Oral Tablet Extended Release 24 Hour; take 1 tablet by     mouth every day; Therapy: 38Qaf2713 to (Evaluate:30Apr2018)  Requested for: 46Xbi8567; Last     Rx:28Rts6538 Ordered   6  Nitroglycerin 0 4 MG Sublingual Tablet Sublingual; PLACE 1 TABLET UNDER TONGUE     EVERY 5 MINUTES IF NEEDED FOR CHEST PAIN MAX 3 TABS CALL     911 IF PERSISTS; Therapy: 63DQA9101 to (Evaluate:90Ieu2107)  Requested for: 04Apr2017; Last     Rx:04Apr2017 Ordered  Medication List Reviewed: The medication list was reviewed and updated today  Allergies   1  No Known Drug Allergies    Physical Exam        Constitutional      General appearance: Appears healthy and well developed  Lymphatic      No visible disturbance  Musculoskeletal      Digits and nails: No clubbing, cyanosis or edema  Cutaneous and nail exam normal        Skin      Scalp skin texture and hair distribution: Normal skin texture on scalp, normal hair distribution  Head: Abnormal        Neck: Normal turgor, no rashes, no lesions  Chest: Abnormal        Abdomen: Normal turgor, no rashes, no lesions  Back: Normal turgor, no rashes, no lesions  Right upper extremity: Normal turgor, no rashes, no lesions  Left upper extremity: Normal turgor, no rashes, no lesions  Right lower extremity: Normal turgor, no rashes, no lesions  Left lower extremity: Normal turgor, no rashes, no lesions         Examination for skin lesions: Abnormal   Skin Lesions: Actinic Keratosis: on area number 4 on the diagram           Neuro/Psych Alert and oriented x 3  Displays comfort and cooperation during encounterl  Affect is normal        Finding Scaling erythematous areas noted above normal keratotic papules with greasy stuck appearance previous sites of skin cancer well healed without recurrence nothing else atypical noted on exam       Procedure        Procedure: destruction of lesion  Indications for the procedure include actinic keratosis  Risks, benefits, alternatives, infection risk, bleeding risk, risk of allergic reaction and the risk of scarring were discussed with the patient--   verbal consent was obtained prior to the procedure  Procedure Note:      The lesion location: See Map  Destruction Technique: cryotherapy with liquid nitrogen application-- and-- 35-11 seconds via cryospray--   Destruction of 4 lesions  Post-Procedure:      Patient Status: the patient tolerated the procedure well  Complications: there were no complications  Health Management   Health Maintenance   COLONOSCOPY; every 10 years; Next Due: 77OJT7374; Overdue  Influenza (Split); every 1 year; Last 60SRL0104; Next Due: 43FZN3665; Overdue    Future Appointments      Date/Time Provider Specialty Site   01/22/2018 04:00 PM J[facility]SEY Pina   [facility]     Signatures    Electronically signed by : JERSEY Jones ; Jan 11 2018  9:29AM EST                       (Author)

## 2018-01-14 VITALS
SYSTOLIC BLOOD PRESSURE: 130 MMHG | WEIGHT: 234.5 LBS | BODY MASS INDEX: 33.57 KG/M2 | OXYGEN SATURATION: 97 % | DIASTOLIC BLOOD PRESSURE: 80 MMHG | HEIGHT: 70 IN | HEART RATE: 61 BPM | RESPIRATION RATE: 12 BRPM

## 2018-01-14 VITALS
BODY MASS INDEX: 34.09 KG/M2 | SYSTOLIC BLOOD PRESSURE: 138 MMHG | DIASTOLIC BLOOD PRESSURE: 92 MMHG | HEIGHT: 70 IN | WEIGHT: 238.13 LBS | RESPIRATION RATE: 12 BRPM | HEART RATE: 72 BPM

## 2018-01-22 ENCOUNTER — ALLSCRIPTS OFFICE VISIT (OUTPATIENT)
Dept: OTHER | Facility: OTHER | Age: 68
End: 2018-01-22

## 2018-02-02 RX ORDER — CLOPIDOGREL BISULFATE 75 MG/1
1 TABLET ORAL DAILY
COMMUNITY
Start: 2014-03-08 | End: 2018-02-06

## 2018-02-02 RX ORDER — CLOPIDOGREL BISULFATE 75 MG/1
75 TABLET ORAL DAILY
Refills: 3 | COMMUNITY
Start: 2017-11-04 | End: 2018-05-03 | Stop reason: SDUPTHER

## 2018-02-02 RX ORDER — LISINOPRIL 10 MG/1
10 TABLET ORAL DAILY
Refills: 2 | COMMUNITY
Start: 2018-01-08 | End: 2018-09-20 | Stop reason: SDUPTHER

## 2018-02-02 RX ORDER — ATORVASTATIN CALCIUM 20 MG/1
1 TABLET, FILM COATED ORAL DAILY
COMMUNITY
Start: 2017-07-07 | End: 2018-05-20 | Stop reason: SDUPTHER

## 2018-02-02 RX ORDER — NITROGLYCERIN 0.4 MG/1
1 TABLET SUBLINGUAL AS NEEDED
COMMUNITY
Start: 2014-05-15

## 2018-02-02 RX ORDER — METOPROLOL SUCCINATE 50 MG/1
50 TABLET, EXTENDED RELEASE ORAL DAILY
Refills: 2 | COMMUNITY
Start: 2017-11-04 | End: 2018-05-03 | Stop reason: SDUPTHER

## 2018-02-06 ENCOUNTER — OFFICE VISIT (OUTPATIENT)
Dept: CARDIOLOGY CLINIC | Facility: CLINIC | Age: 68
End: 2018-02-06
Payer: MEDICARE

## 2018-02-06 VITALS
BODY MASS INDEX: 32.21 KG/M2 | DIASTOLIC BLOOD PRESSURE: 78 MMHG | HEIGHT: 70 IN | WEIGHT: 225 LBS | HEART RATE: 78 BPM | SYSTOLIC BLOOD PRESSURE: 112 MMHG | OXYGEN SATURATION: 97 %

## 2018-02-06 DIAGNOSIS — I25.10 CORONARY ARTERY DISEASE INVOLVING NATIVE CORONARY ARTERY OF NATIVE HEART WITHOUT ANGINA PECTORIS: Primary | ICD-10-CM

## 2018-02-06 DIAGNOSIS — R06.00 DYSPNEA ON EFFORT: ICD-10-CM

## 2018-02-06 DIAGNOSIS — I10 HYPERTENSION, ESSENTIAL: ICD-10-CM

## 2018-02-06 DIAGNOSIS — E78.2 COMBINED HYPERLIPIDEMIA: ICD-10-CM

## 2018-02-06 PROCEDURE — 99214 OFFICE O/P EST MOD 30 MIN: CPT | Performed by: INTERNAL MEDICINE

## 2018-02-06 NOTE — PROGRESS NOTES
Cardiology Follow Up    Warden Hartley  1950  [id number]  [address] [place] [address]    1  Coronary artery disease involving native coronary artery of native heart without angina pectoris  Echo stress test w contrast if indicated   2  Hypertension, essential     3  Combined hyperlipidemia     4  Dyspnea on effort  Echo stress test w contrast if indicated     Chief Complaint   Patient presents with    Follow-up       Interval History:  Patient presents for follow-up visit  Patient has known history of coronary artery disease status post stents in the past   Patient also has history of hypertension and hyperlipidemia  Patient denies any history of chest pain  Patient does have some shortness of breath with heavy exertion  Last stress test was 2 years ago  He states that he has been compliant with all his present medications  No bleeding issues  Patient Active Problem List   Diagnosis    Coronary artery disease involving native coronary artery of native heart without angina pectoris    Hypertension, essential    Combined hyperlipidemia     No past medical history on file  Social History     Social History    Marital status: /Civil Union     Spouse name: N/A    Number of children: N/A    Years of education: N/A     Occupational History    Not on file  Social History Main Topics    Smoking status: Never Smoker    Smokeless tobacco: Never Used    Alcohol use 1 2 oz/week     2 Shots of liquor per week    Drug use: No    Sexual activity: Not on file     Other Topics Concern    Not on file     Social History Narrative    No narrative on file      No family history on file  No past surgical history on file      Current Outpatient Prescriptions:     aspirin 81 MG tablet, Take 1 tablet by mouth daily, Disp: , Rfl:     atorvastatin (LIPITOR) 20 mg tablet, Take 1 tablet by mouth daily, Disp: , Rfl:     clopidogrel (PLAVIX) 75 mg tablet, Take 75 mg by mouth daily, Disp: , Rfl: 3    lisinopril (ZESTRIL) 10 mg tablet, Take 10 mg by mouth daily, Disp: , Rfl: 2    metoprolol succinate (TOPROL-XL) 50 mg 24 hr tablet, Take 50 mg by mouth daily, Disp: , Rfl: 2    nitroglycerin (NITROSTAT) 0 4 mg SL tablet, Place 1 tablet under the tongue as needed, Disp: , Rfl:   Allergies no known allergies    Labs:  Appointment on 01/11/2018   Component Date Value    PSA 01/11/2018 0 8     Hemoglobin A1C 01/11/2018 5 3     EAG 01/11/2018 105     Cholesterol 01/11/2018 119     Triglycerides 01/11/2018 91     HDL, Direct 01/11/2018 37*    LDL Calculated 01/11/2018 64     Sodium 01/11/2018 137     Potassium 01/11/2018 4 2     Chloride 01/11/2018 107     CO2 01/11/2018 24     Anion Gap 01/11/2018 6     BUN 01/11/2018 17     Creatinine 01/11/2018 0 81     Glucose, Fasting 01/11/2018 102*    Calcium 01/11/2018 8 7     eGFR 01/11/2018 93     WBC 01/11/2018 5 89     RBC 01/11/2018 4 96     Hemoglobin 01/11/2018 15 9     Hematocrit 01/11/2018 43 7     MCV 01/11/2018 88     MCH 01/11/2018 32 1     MCHC 01/11/2018 36 4     RDW 01/11/2018 12 1     MPV 01/11/2018 9 4     Platelets 62/53/8211 278     nRBC 01/11/2018 0     Neutrophils Relative 01/11/2018 54     Lymphocytes Relative 01/11/2018 28     Monocytes Relative 01/11/2018 13*    Eosinophils Relative 01/11/2018 4     Basophils Relative 01/11/2018 1     Neutrophils Absolute 01/11/2018 3 22     Lymphocytes Absolute 01/11/2018 1 62     Monocytes Absolute 01/11/2018 0 74     Eosinophils Absolute 01/11/2018 0 22     Basophils Absolute 01/11/2018 0 06     Total Bilirubin 01/11/2018 0 75     Bilirubin, Direct 01/11/2018 0 18     Alkaline Phosphatase 01/11/2018 61     AST 01/11/2018 27     ALT 01/11/2018 36     Total Protein 01/11/2018 7 3     Albumin 01/11/2018 3 3*     Imaging: No results found      Review of Systems:  Review of Systems   REVIEW OF SYSTEMS:  Constitutional:  Denies fever or chills   Eyes:  Denies change in visual acuity   HENT:  Denies nasal congestion or sore throat   Respiratory:   shortness of breath   Cardiovascular:  Denies chest pain or edema   GI:  Denies abdominal pain, nausea, vomiting, bloody stools or diarrhea   :  Denies dysuria, frequency, difficulty in micturition and nocturia  Musculoskeletal:  Denies back pain or joint pain   Neurologic:  Denies headache, focal weakness or sensory changes   Endocrine:  Denies polyuria or polydipsia   Lymphatic:  Denies swollen glands   Psychiatric:  Denies depression or anxiety     Physical Exam:  Physical Exam   PHYSICAL EXAM:  General:  Patient is not in acute distress   Head: Normocephalic, Atraumatic  HEENT:  Both pupils normal-size atraumatic, normocephalic, nonicteric  Neck:  JVP not raised  Trachea central  No carotid bruit  Respiratory:  normal breath sounds no crackles  no rhonchi  Cardiovascular:  Regular rate and rhythm no S3 no murmurs  GI:  Abdomen soft nontender  No organomegaly  Lymphatic:  No cervical or inguinal lymphadenopathy  Neurologic:  Patient is awake alert, oriented   Grossly nonfocal    Discussion/Summary:  Patient with known history of coronary artery disease status post stents in the past with hypertension and hyperlipidemia  Given symptoms of shortness of breath with exertion, patient will be scheduled for a stress echocardiogram to assess for exercise capacity as well as ischemia  Sensitivity and specificity of stress test discussed with the patient  Symptoms to watch out from cardiac standpoint which would indicate the need for further cardiac evaluation also discussed with the patient  Patient understands the risks and benefits of anti-platelet therapy  Patient report any bleeding issues  Dietary and risk factor modification to continue  Patient had a few questions which were answered    Follow-up in 6 months or earlier as needed  Follow-up with primary care physician

## 2018-02-27 ENCOUNTER — TELEPHONE (OUTPATIENT)
Dept: CARDIOLOGY CLINIC | Facility: CLINIC | Age: 68
End: 2018-02-27

## 2018-02-27 ENCOUNTER — HOSPITAL ENCOUNTER (OUTPATIENT)
Dept: NON INVASIVE DIAGNOSTICS | Facility: CLINIC | Age: 68
Discharge: HOME | End: 2018-02-27
Payer: MEDICARE

## 2018-02-27 DIAGNOSIS — R06.00 DYSPNEA ON EFFORT: ICD-10-CM

## 2018-02-27 DIAGNOSIS — I25.10 CORONARY ARTERY DISEASE INVOLVING NATIVE CORONARY ARTERY OF NATIVE HEART WITHOUT ANGINA PECTORIS: Primary | ICD-10-CM

## 2018-02-27 DIAGNOSIS — I25.10 CORONARY ARTERY DISEASE INVOLVING NATIVE CORONARY ARTERY OF NATIVE HEART WITHOUT ANGINA PECTORIS: ICD-10-CM

## 2018-02-27 DIAGNOSIS — R94.39 ABNORMAL STRESS TEST: ICD-10-CM

## 2018-02-27 PROCEDURE — 93351 STRESS TTE COMPLETE: CPT | Performed by: INTERNAL MEDICINE

## 2018-02-27 PROCEDURE — 93350 STRESS TTE ONLY: CPT

## 2018-02-27 NOTE — TELEPHONE ENCOUNTER
----- Message from Db Newby MD sent at 2/27/2018  1:47 PM EST -----  Regarding: stress test  This Patient had a stress echocardiogram today which showed some abnormalities  I left a message for patient call back  Please text me if he calls back

## 2018-02-27 NOTE — TELEPHONE ENCOUNTER
PT SAID THAT DR WELLS CALLED HIM THIS MORN TO DISCUSS TEST RESULTS; PT IS RETURNING DR WELLS'S CALL   I DID NOTIFY PT THAT DR WELLS HAS LEFT FOR THE DAY BUT WILL BE IN ON 2/28/18

## 2018-02-28 ENCOUNTER — TELEPHONE (OUTPATIENT)
Dept: CARDIOLOGY CLINIC | Facility: CLINIC | Age: 68
End: 2018-02-28

## 2018-02-28 LAB
ARRHY DURING EX: NORMAL
CHEST PAIN STATEMENT: NORMAL
MAX DIASTOLIC BP: 84 MMHG
MAX HEART RATE: 146 BPM
MAX PREDICTED HEART RATE: 154 BPM
MAX. SYSTOLIC BP: 176 MMHG
PROTOCOL NAME: NORMAL
REASON FOR TERMINATION: NORMAL
TARGET HR FORMULA: NORMAL
TEST INDICATION: NORMAL
TIME IN EXERCISE PHASE: NORMAL

## 2018-02-28 NOTE — TELEPHONE ENCOUNTER
Per Jagjit Minaya no auth needed, she called scheduling and spoke with Juan Manuel Gregory to get pt in ASAP   Spoke with pt to let him know, he'll await cb to schedule it-MS

## 2018-02-28 NOTE — TELEPHONE ENCOUNTER
Order given to [facility] to check if Rodolfo Menendez is needed first then pt will be called and scheduled

## 2018-03-09 ENCOUNTER — TELEPHONE (OUTPATIENT)
Dept: CARDIOLOGY CLINIC | Facility: CLINIC | Age: 68
End: 2018-03-09

## 2018-03-09 ENCOUNTER — HOSPITAL ENCOUNTER (OUTPATIENT)
Dept: NON INVASIVE DIAGNOSTICS | Facility: CLINIC | Age: 68
Discharge: HOME | End: 2018-03-09
Payer: MEDICARE

## 2018-03-09 DIAGNOSIS — I25.10 CORONARY ARTERY DISEASE INVOLVING NATIVE CORONARY ARTERY OF NATIVE HEART WITHOUT ANGINA PECTORIS: ICD-10-CM

## 2018-03-09 DIAGNOSIS — R94.39 ABNORMAL STRESS TEST: ICD-10-CM

## 2018-03-09 PROCEDURE — 78452 HT MUSCLE IMAGE SPECT MULT: CPT

## 2018-03-09 PROCEDURE — A9502 TC99M TETROFOSMIN: HCPCS

## 2018-03-09 PROCEDURE — 93017 CV STRESS TEST TRACING ONLY: CPT

## 2018-03-09 RX ORDER — AMINOPHYLLINE DIHYDRATE 25 MG/ML
50 INJECTION, SOLUTION INTRAVENOUS ONCE
Status: CANCELLED | OUTPATIENT
Start: 2018-03-09 | End: 2018-03-09

## 2018-03-09 NOTE — TELEPHONE ENCOUNTER
----- Message from Susannah Thomas MD sent at 3/9/2018  3:27 PM EST -----  Call nuclear stress test showed fixed defect consistent with previous scar from previous myocardial infarction  Otherwise, no significant changes  Ejection fraction is normal  Continue present medications  To report any symptoms out of the ordinary

## 2018-03-09 NOTE — TELEPHONE ENCOUNTER
PT HAD A NUCLEAR STRESS TEST DONE THIS MORNING AND WANTS TO KNOW IF DR WELLS CAN CALL HIM WITH RESULTS WHEN HE HAS A CHANCE

## 2018-03-14 LAB
ARRHY DURING EX: NORMAL
CHEST PAIN STATEMENT: NORMAL
MAX DIASTOLIC BP: 88 MMHG
MAX HEART RATE: 142 BPM
MAX PREDICTED HEART RATE: 154 BPM
MAX. SYSTOLIC BP: 168 MMHG
PROTOCOL NAME: NORMAL
REASON FOR TERMINATION: NORMAL
TARGET HR FORMULA: NORMAL
TEST INDICATION: NORMAL
TIME IN EXERCISE PHASE: NORMAL

## 2018-05-03 DIAGNOSIS — I25.10 CORONARY ARTERY DISEASE, ANGINA PRESENCE UNSPECIFIED, UNSPECIFIED VESSEL OR LESION TYPE, UNSPECIFIED WHETHER NATIVE OR TRANSPLANTED HEART: Primary | ICD-10-CM

## 2018-05-03 DIAGNOSIS — I10 BENIGN ESSENTIAL HTN: ICD-10-CM

## 2018-05-03 RX ORDER — CLOPIDOGREL BISULFATE 75 MG/1
75 TABLET ORAL DAILY
Qty: 90 TABLET | Refills: 3 | Status: SHIPPED | OUTPATIENT
Start: 2018-05-03 | End: 2019-05-28 | Stop reason: SDUPTHER

## 2018-05-03 RX ORDER — METOPROLOL SUCCINATE 50 MG/1
50 TABLET, EXTENDED RELEASE ORAL DAILY
Qty: 90 TABLET | Refills: 3 | Status: SHIPPED | OUTPATIENT
Start: 2018-05-03 | End: 2019-05-28 | Stop reason: SDUPTHER

## 2018-05-20 DIAGNOSIS — E78.2 COMBINED HYPERLIPIDEMIA: Primary | ICD-10-CM

## 2018-05-20 RX ORDER — ATORVASTATIN CALCIUM 20 MG/1
TABLET, FILM COATED ORAL
Qty: 90 TABLET | Refills: 1 | Status: SHIPPED | OUTPATIENT
Start: 2018-05-20 | End: 2019-05-12 | Stop reason: SDUPTHER

## 2018-07-01 DIAGNOSIS — R73.01 IMPAIRED FASTING GLUCOSE: ICD-10-CM

## 2018-07-01 DIAGNOSIS — E78.5 HYPERLIPIDEMIA: ICD-10-CM

## 2018-07-01 DIAGNOSIS — I10 ESSENTIAL (PRIMARY) HYPERTENSION: ICD-10-CM

## 2018-07-23 ENCOUNTER — APPOINTMENT (OUTPATIENT)
Dept: LAB | Facility: HOSPITAL | Age: 68
End: 2018-07-23
Attending: INTERNAL MEDICINE
Payer: MEDICARE

## 2018-07-23 DIAGNOSIS — R73.01 IMPAIRED FASTING GLUCOSE: ICD-10-CM

## 2018-07-23 DIAGNOSIS — I10 ESSENTIAL (PRIMARY) HYPERTENSION: ICD-10-CM

## 2018-07-23 DIAGNOSIS — E78.5 HYPERLIPIDEMIA: ICD-10-CM

## 2018-07-23 LAB
ALBUMIN SERPL BCP-MCNC: 3.6 G/DL (ref 3.5–5)
ALP SERPL-CCNC: 67 U/L (ref 46–116)
ALT SERPL W P-5'-P-CCNC: 38 U/L (ref 12–78)
ANION GAP SERPL CALCULATED.3IONS-SCNC: 5 MMOL/L (ref 4–13)
AST SERPL W P-5'-P-CCNC: 25 U/L (ref 5–45)
BASOPHILS # BLD AUTO: 0.07 THOUSANDS/ΜL (ref 0–0.1)
BASOPHILS NFR BLD AUTO: 1 % (ref 0–1)
BILIRUB DIRECT SERPL-MCNC: 0.25 MG/DL (ref 0–0.2)
BILIRUB SERPL-MCNC: 1.1 MG/DL (ref 0.2–1)
BUN SERPL-MCNC: 14 MG/DL (ref 5–25)
CALCIUM SERPL-MCNC: 8.9 MG/DL (ref 8.3–10.1)
CHLORIDE SERPL-SCNC: 104 MMOL/L (ref 100–108)
CHOLEST SERPL-MCNC: 163 MG/DL (ref 50–200)
CO2 SERPL-SCNC: 27 MMOL/L (ref 21–32)
CREAT SERPL-MCNC: 0.95 MG/DL (ref 0.6–1.3)
EOSINOPHIL # BLD AUTO: 0.37 THOUSAND/ΜL (ref 0–0.61)
EOSINOPHIL NFR BLD AUTO: 7 % (ref 0–6)
ERYTHROCYTE [DISTWIDTH] IN BLOOD BY AUTOMATED COUNT: 12.1 % (ref 11.6–15.1)
EST. AVERAGE GLUCOSE BLD GHB EST-MCNC: 100 MG/DL
GFR SERPL CREATININE-BSD FRML MDRD: 83 ML/MIN/1.73SQ M
GLUCOSE P FAST SERPL-MCNC: 102 MG/DL (ref 65–99)
HBA1C MFR BLD: 5.1 % (ref 4.2–6.3)
HCT VFR BLD AUTO: 45.4 % (ref 36.5–49.3)
HDLC SERPL-MCNC: 53 MG/DL (ref 40–60)
HGB BLD-MCNC: 16.2 G/DL (ref 12–17)
IMM GRANULOCYTES # BLD AUTO: 0.01 THOUSAND/UL (ref 0–0.2)
IMM GRANULOCYTES NFR BLD AUTO: 0 % (ref 0–2)
LDLC SERPL CALC-MCNC: 90 MG/DL (ref 0–100)
LYMPHOCYTES # BLD AUTO: 1.73 THOUSANDS/ΜL (ref 0.6–4.47)
LYMPHOCYTES NFR BLD AUTO: 30 % (ref 14–44)
MCH RBC QN AUTO: 31.6 PG (ref 26.8–34.3)
MCHC RBC AUTO-ENTMCNC: 35.7 G/DL (ref 31.4–37.4)
MCV RBC AUTO: 89 FL (ref 82–98)
MONOCYTES # BLD AUTO: 0.62 THOUSAND/ΜL (ref 0.17–1.22)
MONOCYTES NFR BLD AUTO: 11 % (ref 4–12)
NEUTROPHILS # BLD AUTO: 2.92 THOUSANDS/ΜL (ref 1.85–7.62)
NEUTS SEG NFR BLD AUTO: 51 % (ref 43–75)
NONHDLC SERPL-MCNC: 110 MG/DL
NRBC BLD AUTO-RTO: 0 /100 WBCS
PLATELET # BLD AUTO: 155 THOUSANDS/UL (ref 149–390)
PMV BLD AUTO: 9.1 FL (ref 8.9–12.7)
POTASSIUM SERPL-SCNC: 4.2 MMOL/L (ref 3.5–5.3)
PROT SERPL-MCNC: 6.8 G/DL (ref 6.4–8.2)
RBC # BLD AUTO: 5.12 MILLION/UL (ref 3.88–5.62)
SODIUM SERPL-SCNC: 136 MMOL/L (ref 136–145)
TRIGL SERPL-MCNC: 100 MG/DL
WBC # BLD AUTO: 5.72 THOUSAND/UL (ref 4.31–10.16)

## 2018-07-23 PROCEDURE — 36415 COLL VENOUS BLD VENIPUNCTURE: CPT

## 2018-07-23 PROCEDURE — 83036 HEMOGLOBIN GLYCOSYLATED A1C: CPT

## 2018-07-23 PROCEDURE — 80076 HEPATIC FUNCTION PANEL: CPT

## 2018-07-23 PROCEDURE — 85025 COMPLETE CBC W/AUTO DIFF WBC: CPT

## 2018-07-23 PROCEDURE — 80048 BASIC METABOLIC PNL TOTAL CA: CPT

## 2018-07-23 PROCEDURE — 80061 LIPID PANEL: CPT

## 2018-07-24 ENCOUNTER — OFFICE VISIT (OUTPATIENT)
Dept: INTERNAL MEDICINE CLINIC | Facility: CLINIC | Age: 68
End: 2018-07-24
Payer: MEDICARE

## 2018-07-24 VITALS
BODY MASS INDEX: 32.61 KG/M2 | HEIGHT: 70 IN | OXYGEN SATURATION: 97 % | HEART RATE: 66 BPM | WEIGHT: 227.8 LBS | DIASTOLIC BLOOD PRESSURE: 80 MMHG | SYSTOLIC BLOOD PRESSURE: 120 MMHG

## 2018-07-24 DIAGNOSIS — I25.10 CORONARY ARTERY DISEASE INVOLVING NATIVE CORONARY ARTERY OF NATIVE HEART WITHOUT ANGINA PECTORIS: Primary | ICD-10-CM

## 2018-07-24 DIAGNOSIS — I10 HYPERTENSION, ESSENTIAL: ICD-10-CM

## 2018-07-24 DIAGNOSIS — E78.2 COMBINED HYPERLIPIDEMIA: ICD-10-CM

## 2018-07-24 DIAGNOSIS — Z12.5 SCREENING FOR PROSTATE CANCER: ICD-10-CM

## 2018-07-24 DIAGNOSIS — R73.01 IMPAIRED FASTING GLUCOSE: ICD-10-CM

## 2018-07-24 DIAGNOSIS — Z23 NEED FOR PNEUMOCOCCAL VACCINATION: ICD-10-CM

## 2018-07-24 DIAGNOSIS — Z11.59 NEED FOR HEPATITIS C SCREENING TEST: ICD-10-CM

## 2018-07-24 PROCEDURE — 99214 OFFICE O/P EST MOD 30 MIN: CPT | Performed by: INTERNAL MEDICINE

## 2018-07-24 PROCEDURE — 90732 PPSV23 VACC 2 YRS+ SUBQ/IM: CPT | Performed by: INTERNAL MEDICINE

## 2018-07-24 PROCEDURE — G0009 ADMIN PNEUMOCOCCAL VACCINE: HCPCS | Performed by: INTERNAL MEDICINE

## 2018-07-24 NOTE — PATIENT INSTRUCTIONS
Lab data reviewed in detail in compared to prior     Coronary artery disease remained stable without angina, continue to modify risk factors as below    Hypertension is stable on present regimen    Hyperlipidemia - LDL at goal     Impaired fasting glucose - borderline blood sugars with stable hemoglobin A1c 5 1, no indication for significant change in diet, continue with excellent exercise regimen    Health maintenance- up-to-date with colonoscopy, due again in 2023, Pneumovax today to complete the pneumococcal vaccine series, we discussed shingles vaccine and will readdress at follow-up, PSA testing in January after the 11th    Routine follow-up after labs in January, sooner as needed

## 2018-07-24 NOTE — PROGRESS NOTES
Assessment/Plan:    Patient Instructions   Lab data reviewed in detail in compared to prior     Coronary artery disease remained stable without angina, continue to modify risk factors as below    Hypertension is stable on present regimen    Hyperlipidemia - LDL at goal     Impaired fasting glucose - borderline blood sugars with stable hemoglobin A1c 5 1, no indication for significant change in diet, continue with excellent exercise regimen    Health maintenance- up-to-date with colonoscopy, due again in 2023, Pneumovax today to complete the pneumococcal vaccine series, we discussed shingles vaccine and will readdress at follow-up, PSA testing in January after the 11th    Routine follow-up after labs in January, sooner as needed  Diagnoses and all orders for this visit:    Coronary artery disease involving native coronary artery of native heart without angina pectoris    Hypertension, essential  -     CBC; Future  -     Comprehensive metabolic panel; Future  -     TSH, 3rd generation with Free T4 reflex; Future    Combined hyperlipidemia  -     Lipid panel; Future    Impaired fasting glucose  -     Hemoglobin A1c (w/out EAG); Future    Need for hepatitis C screening test  -     Hepatitis C antibody; Future    Screening for prostate cancer  -     PSA, Total Screen; Future    Need for pneumococcal vaccination  -     PNEUMOCOCCAL POLYSACCHARIDE VACCINE 23-VALENT =>1YO SQ IM         Subjective:      Patient ID: Ryan Zuluaga is a 78 y o  male    Feeling well, no complaints  CAD-keeping active, exercising at least 5 d per week w/ cardio and wts  No angina  HTN/HPL-taking rx as directed, no home bps'               Current Outpatient Prescriptions:     aspirin 81 MG tablet, Take 1 tablet by mouth daily, Disp: , Rfl:     atorvastatin (LIPITOR) 20 mg tablet, TAKE 1 TABLET EVERY DAY, Disp: 90 tablet, Rfl: 1    clopidogrel (PLAVIX) 75 mg tablet, Take 1 tablet (75 mg total) by mouth daily, Disp: 90 tablet, Rfl: 3   lisinopril (ZESTRIL) 10 mg tablet, Take 10 mg by mouth daily, Disp: , Rfl: 2    metoprolol succinate (TOPROL-XL) 50 mg 24 hr tablet, Take 1 tablet (50 mg total) by mouth daily, Disp: 90 tablet, Rfl: 3    nitroglycerin (NITROSTAT) 0 4 mg SL tablet, Place 1 tablet under the tongue as needed, Disp: , Rfl:     Recent Results (from the past 1008 hour(s))   Hemoglobin A1c    Collection Time: 07/23/18  8:03 AM   Result Value Ref Range    Hemoglobin A1C 5 1 4 2 - 6 3 %     mg/dl   Lipid panel    Collection Time: 07/23/18  8:03 AM   Result Value Ref Range    Cholesterol 163 50 - 200 mg/dL    Triglycerides 100 <=150 mg/dL    HDL, Direct 53 40 - 60 mg/dL    LDL Calculated 90 0 - 100 mg/dL    Non-HDL-Chol (CHOL-HDL) 110 mg/dl   Basic metabolic panel    Collection Time: 07/23/18  8:03 AM   Result Value Ref Range    Sodium 136 136 - 145 mmol/L    Potassium 4 2 3 5 - 5 3 mmol/L    Chloride 104 100 - 108 mmol/L    CO2 27 21 - 32 mmol/L    Anion Gap 5 4 - 13 mmol/L    BUN 14 5 - 25 mg/dL    Creatinine 0 95 0 60 - 1 30 mg/dL    Glucose, Fasting 102 (H) 65 - 99 mg/dL    Calcium 8 9 8 3 - 10 1 mg/dL    eGFR 83 ml/min/1 73sq m   CBC and differential    Collection Time: 07/23/18  8:03 AM   Result Value Ref Range    WBC 5 72 4 31 - 10 16 Thousand/uL    RBC 5 12 3 88 - 5 62 Million/uL    Hemoglobin 16 2 12 0 - 17 0 g/dL    Hematocrit 45 4 36 5 - 49 3 %    MCV 89 82 - 98 fL    MCH 31 6 26 8 - 34 3 pg    MCHC 35 7 31 4 - 37 4 g/dL    RDW 12 1 11 6 - 15 1 %    MPV 9 1 8 9 - 12 7 fL    Platelets 370 164 - 561 Thousands/uL    nRBC 0 /100 WBCs    Neutrophils Relative 51 43 - 75 %    Immat GRANS % 0 0 - 2 %    Lymphocytes Relative 30 14 - 44 %    Monocytes Relative 11 4 - 12 %    Eosinophils Relative 7 (H) 0 - 6 %    Basophils Relative 1 0 - 1 %    Neutrophils Absolute 2 92 1 85 - 7 62 Thousands/µL    Immature Grans Absolute 0 01 0 00 - 0 20 Thousand/uL    Lymphocytes Absolute 1 73 0 60 - 4 47 Thousands/µL    Monocytes Absolute 0 62 0 17 - 1 22 Thousand/µL    Eosinophils Absolute 0 37 0 00 - 0 61 Thousand/µL    Basophils Absolute 0 07 0 00 - 0 10 Thousands/µL   Hepatic function panel    Collection Time: 07/23/18  8:03 AM   Result Value Ref Range    Total Bilirubin 1 10 (H) 0 20 - 1 00 mg/dL    Bilirubin, Direct 0 25 (H) 0 00 - 0 20 mg/dL    Alkaline Phosphatase 67 46 - 116 U/L    AST 25 5 - 45 U/L    ALT 38 12 - 78 U/L    Total Protein 6 8 6 4 - 8 2 g/dL    Albumin 3 6 3 5 - 5 0 g/dL       The following portions of the patient's history were reviewed and updated as appropriate: allergies, current medications, past family history, past medical history, past social history, past surgical history and problem list      Review of Systems   Constitutional: Negative for appetite change, chills, diaphoresis, fatigue, fever and unexpected weight change  HENT: Negative for congestion, hearing loss and rhinorrhea  Eyes: Negative for visual disturbance  Respiratory: Negative for cough, chest tightness, shortness of breath and wheezing  Cardiovascular: Negative for chest pain, palpitations and leg swelling  Gastrointestinal: Negative for abdominal pain and blood in stool  Endocrine: Negative for cold intolerance, heat intolerance, polydipsia and polyuria  Genitourinary: Negative for difficulty urinating, dysuria, frequency and urgency  Musculoskeletal: Negative for arthralgias and myalgias  Skin: Negative for rash  Neurological: Negative for dizziness, weakness, light-headedness and headaches  Hematological: Does not bruise/bleed easily  Psychiatric/Behavioral: Negative for dysphoric mood and sleep disturbance  Objective:      Vitals:    07/24/18 0921   BP: 120/80   Pulse: 66   SpO2: 97%          Physical Exam   Constitutional: He is oriented to person, place, and time  He appears well-developed and well-nourished  HENT:   Head: Normocephalic and atraumatic     Nose: Nose normal    Mouth/Throat: Oropharynx is clear and moist    Eyes: Conjunctivae and EOM are normal  Pupils are equal, round, and reactive to light  No scleral icterus  Neck: Normal range of motion  Neck supple  No JVD present  No tracheal deviation present  No thyromegaly present  Cardiovascular: Normal rate, regular rhythm and intact distal pulses  Exam reveals no gallop and no friction rub  No murmur heard  Pulmonary/Chest: Effort normal and breath sounds normal  No respiratory distress  He has no wheezes  He has no rales  Musculoskeletal: He exhibits no edema or deformity  Lymphadenopathy:     He has no cervical adenopathy  Neurological: He is alert and oriented to person, place, and time  No cranial nerve deficit  Skin: Skin is warm and dry  No rash noted  No erythema  No pallor  Psychiatric: He has a normal mood and affect   His behavior is normal  Judgment and thought content normal

## 2018-08-01 ENCOUNTER — OFFICE VISIT (OUTPATIENT)
Dept: URGENT CARE | Facility: MEDICAL CENTER | Age: 68
End: 2018-08-01
Payer: MEDICARE

## 2018-08-01 VITALS
WEIGHT: 222.13 LBS | HEIGHT: 70 IN | RESPIRATION RATE: 18 BRPM | TEMPERATURE: 97.6 F | SYSTOLIC BLOOD PRESSURE: 120 MMHG | OXYGEN SATURATION: 97 % | BODY MASS INDEX: 31.8 KG/M2 | HEART RATE: 62 BPM | DIASTOLIC BLOOD PRESSURE: 80 MMHG

## 2018-08-01 DIAGNOSIS — H60.332 ACUTE SWIMMER'S EAR OF LEFT SIDE: Primary | ICD-10-CM

## 2018-08-01 PROCEDURE — G0463 HOSPITAL OUTPT CLINIC VISIT: HCPCS | Performed by: FAMILY MEDICINE

## 2018-08-01 PROCEDURE — 99213 OFFICE O/P EST LOW 20 MIN: CPT | Performed by: FAMILY MEDICINE

## 2018-08-01 RX ORDER — CIPROFLOXACIN AND DEXAMETHASONE 3; 1 MG/ML; MG/ML
4 SUSPENSION/ DROPS AURICULAR (OTIC) 2 TIMES DAILY
Qty: 7.5 ML | Refills: 0 | Status: SHIPPED | OUTPATIENT
Start: 2018-08-01 | End: 2020-04-01

## 2018-08-01 NOTE — PATIENT INSTRUCTIONS
Use ciprodex drops as directed  Avoid water in the ear  Tylenol and motrin for pain  Follow up with your PCP for persistent symptoms  Go to the ER for any distress  Otitis Externa   AMBULATORY CARE:   Otitis externa , or swimmer's ear, is an infection in the outer ear canal  This canal goes from the outside of the ear to the eardrum  Common signs and symptoms include the following:   · Ear pain    · Outer ear canal is red and swollen    · Clear fluid or pus is leaking out of your ear    · Outer ear canal is itchy and you see a rash    · Trouble hearing because your ear is plugged    · Feel a bump in your ear canal, called a polyp    · Flakes of skin fall from your ear  Seek care immediately if:   · You have severe ear pain  · You are suddenly unable to hear at all  · You have new swelling in your face, behind your ears, or in your neck  · You suddenly cannot move part of your face  · Your face suddenly feels numb  Contact your healthcare provider if:   · You have a fever  · Your signs and symptoms do not get better after 2 days of treatment  · Your signs and symptoms go away for a time, but then come back  · You have questions or concerns about your condition or care  Treatment for otitis externa  may include any of the following:  · NSAIDs , such as ibuprofen, help decrease swelling, pain, and fever  This medicine is available with or without a doctor's order  NSAIDs can cause stomach bleeding or kidney problems in certain people  If you take blood thinner medicine, always ask if NSAIDs are safe for you  Always read the medicine label and follow directions  Do not give these medicines to children under 10months of age without direction from your child's healthcare provider  · Acetaminophen  decreases pain and fever  It is available without a doctor's order  Ask how much to take and how often to take it  Follow directions   Acetaminophen can cause liver damage if not taken correctly  · Ear drops  that contain an antibiotic may be given  The antibiotic helps treat a bacterial infection  You may also be given steroid medicine  The steroid helps decrease redness, swelling, and pain  · Ear wicking  removes fluid or wax from your outer ear canal  Healthcare providers may insert a small tube, called a wick, into your ear to help drain fluid  A wick also may be used to put medicine into your ear canal if the canal is blocked  Follow the steps below to use eardrops:   · Lie down on your side with your infected ear facing up  · Carefully drip the correct number of eardrops into your ear  Have another person help you if possible  · Gently move the outside part of your ear back and forth to help the medicine reach your ear canal      · Stay lying down in the same position (with your ear facing up) for 3 to 5 minutes  Prevent otitis externa:   · Do not put cotton swabs or foreign objects in your ears  · Wrap a clean moist washcloth around your finger, and use it to clean your outer ear and remove extra ear wax  · Use ear plugs when you swim  Dry your outer ears completely after you swim or bathe  Follow up with your healthcare provider as directed:  Write down your questions so you remember to ask them during your visits  © 2017 [address] Information is for End User's use only and may not be sold, redistributed or otherwise used for commercial purposes  All illustrations and images included in CareNotes® are the copyrighted property of [employer] , [employer]  or Andrew Martins  The above information is an  only  It is not intended as medical advice for individual conditions or treatments  Talk to your doctor, nurse or pharmacist before following any medical regimen to see if it is safe and effective for you

## 2018-08-01 NOTE — PROGRESS NOTES
[address]        NAME: Rolf Holley is a 78 y o  male  : 1950    MRN: [id number]  DATE: 2018  TIME: 10:33 AM    Assessment and Plan   Acute swimmer's ear of left side [H60 332]  1  Acute swimmer's ear of left side  ciprofloxacin-dexamethasone (CIPRODEX) otic suspension         Patient Instructions     Use ciprodex drops as directed  Avoid water in the ear  Tylenol and motrin for pain  Follow up with PCP in 3-5 days  Proceed to  ER if symptoms worsen  Chief Complaint     Chief Complaint   Patient presents with    Earache     x 2days pain and feels fluid in ear, has been using Debrox gtts and no wax return noted  History of Present Illness       Earache    There is pain in the left ear  This is a new problem  Episode onset: x 2 days  The problem occurs constantly  The problem has been unchanged  There has been no fever  Pertinent negatives include no abdominal pain, coughing, diarrhea, ear discharge, headaches, hearing loss, neck pain, rash, rhinorrhea, sore throat or vomiting  Treatments tried: debrox drops  The treatment provided no relief  There is no history of a chronic ear infection  Review of Systems   Review of Systems   Constitutional: Negative for fatigue and fever  HENT: Positive for ear pain  Negative for ear discharge, hearing loss, rhinorrhea and sore throat  Respiratory: Negative for cough  Gastrointestinal: Negative for abdominal pain, diarrhea and vomiting  Musculoskeletal: Negative for neck pain  Skin: Negative for rash  Neurological: Negative for headaches           Current Medications       Current Outpatient Prescriptions:     aspirin 81 MG tablet, Take 1 tablet by mouth daily, Disp: , Rfl:     atorvastatin (LIPITOR) 20 mg tablet, TAKE 1 TABLET EVERY DAY, Disp: 90 tablet, Rfl: 1    clopidogrel (PLAVIX) 75 mg tablet, Take 1 tablet (75 mg total) by mouth daily, Disp: 90 tablet, Rfl: 3    lisinopril (ZESTRIL) 10 mg tablet, Take 10 mg by mouth daily, Disp: , Rfl: 2    metoprolol succinate (TOPROL-XL) 50 mg 24 hr tablet, Take 1 tablet (50 mg total) by mouth daily, Disp: 90 tablet, Rfl: 3    nitroglycerin (NITROSTAT) 0 4 mg SL tablet, Place 1 tablet under the tongue as needed, Disp: , Rfl:     ciprofloxacin-dexamethasone (CIPRODEX) otic suspension, Administer 4 drops into the left ear 2 (two) times a day for 7 days, Disp: 7 5 mL, Rfl: 0    Current Allergies     Allergies as of 08/01/2018    (No Known Allergies)            The following portions of the patient's history were reviewed and updated as appropriate: allergies, current medications, past family history, past medical history, past social history, past surgical history and problem list      Past Medical History:   Diagnosis Date    Abnormal LFTs     Benign neoplasm of skin     Lumbar canal stenosis     with neurogenic claudication     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, congenital     Nephrotic syndrome     Nonmelanoma skin cancer     last assessed 1/11/18    Old myocardial infarction     Skin cancer     last assessed 1/13/14    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        Past Surgical History:   Procedure Laterality Date    ATHERECTOMY      1 with stent placement  last assessed 6/17/14    CARDIAC CATHETERIZATION      outcome: successful  last assessed 6/17/14    CORONARY ANGIOPLASTY      CORONARY ANGIOPLASTY WITH STENT PLACEMENT      to LAD, diagonal and RCA    MALIGNANT SKIN LESION EXCISION  01/09/2012    Trunk,  SCC chest        Family History   Problem Relation Age of Onset    Heart attack Father 50    Cancer Father     Coronary artery disease Family          Medications have been verified  Objective   /80   Pulse 62   Temp 97 6 °F (36 4 °C) (Temporal)   Resp 18   Ht 5' 10" (1 778 m)   Wt 101 kg (222 lb 2 oz)   SpO2 97%   BMI 31 87 kg/m²        Physical Exam     Physical Exam   Constitutional: He appears well-developed and well-nourished  No distress  HENT:   Head: Normocephalic and atraumatic  Right Ear: Tympanic membrane, external ear and ear canal normal    Left Ear: There is drainage and swelling (swelling, erythema, and drainage of the external canal)  Nose: Nose normal    Eyes: Conjunctivae are normal  Pupils are equal, round, and reactive to light  Cardiovascular: Normal rate, regular rhythm and normal heart sounds  Pulmonary/Chest: Effort normal and breath sounds normal  No respiratory distress  He has no wheezes  He has no rales  Neurological: He is alert  Skin: Skin is warm and dry  He is not diaphoretic  Nursing note and vitals reviewed

## 2018-08-16 ENCOUNTER — OFFICE VISIT (OUTPATIENT)
Dept: DERMATOLOGY | Facility: CLINIC | Age: 68
End: 2018-08-16
Payer: MEDICARE

## 2018-08-16 DIAGNOSIS — L82.1 SEBORRHEIC KERATOSIS: Primary | ICD-10-CM

## 2018-08-16 DIAGNOSIS — Z13.89 SCREENING FOR SKIN CONDITION: ICD-10-CM

## 2018-08-16 DIAGNOSIS — Z85.828 HISTORY OF SKIN CANCER: ICD-10-CM

## 2018-08-16 PROCEDURE — 99213 OFFICE O/P EST LOW 20 MIN: CPT | Performed by: DERMATOLOGY

## 2018-09-20 DIAGNOSIS — I10 ESSENTIAL HYPERTENSION: Primary | ICD-10-CM

## 2018-09-20 RX ORDER — LISINOPRIL 10 MG/1
TABLET ORAL
Qty: 180 TABLET | Refills: 1 | Status: SHIPPED | OUTPATIENT
Start: 2018-09-20 | End: 2019-09-21 | Stop reason: SDUPTHER

## 2019-01-26 ENCOUNTER — APPOINTMENT (OUTPATIENT)
Dept: LAB | Facility: CLINIC | Age: 69
End: 2019-01-26
Payer: MEDICARE

## 2019-01-26 ENCOUNTER — TRANSCRIBE ORDERS (OUTPATIENT)
Dept: LAB | Facility: CLINIC | Age: 69
End: 2019-01-26

## 2019-01-26 DIAGNOSIS — Z12.5 SCREENING FOR PROSTATE CANCER: ICD-10-CM

## 2019-01-26 DIAGNOSIS — E78.2 COMBINED HYPERLIPIDEMIA: ICD-10-CM

## 2019-01-26 DIAGNOSIS — Z11.59 NEED FOR HEPATITIS C SCREENING TEST: ICD-10-CM

## 2019-01-26 DIAGNOSIS — I10 HYPERTENSION, ESSENTIAL: ICD-10-CM

## 2019-01-26 DIAGNOSIS — R73.01 IMPAIRED FASTING GLUCOSE: ICD-10-CM

## 2019-01-26 DIAGNOSIS — R73.01 IMPAIRED FASTING GLUCOSE: Primary | ICD-10-CM

## 2019-01-26 LAB
ALBUMIN SERPL BCP-MCNC: 3.6 G/DL (ref 3.5–5)
ALP SERPL-CCNC: 61 U/L (ref 46–116)
ALT SERPL W P-5'-P-CCNC: 40 U/L (ref 12–78)
ANION GAP SERPL CALCULATED.3IONS-SCNC: 6 MMOL/L (ref 4–13)
AST SERPL W P-5'-P-CCNC: 27 U/L (ref 5–45)
BILIRUB SERPL-MCNC: 1.02 MG/DL (ref 0.2–1)
BUN SERPL-MCNC: 15 MG/DL (ref 5–25)
CALCIUM SERPL-MCNC: 8.5 MG/DL (ref 8.3–10.1)
CHLORIDE SERPL-SCNC: 106 MMOL/L (ref 100–108)
CHOLEST SERPL-MCNC: 146 MG/DL (ref 50–200)
CO2 SERPL-SCNC: 24 MMOL/L (ref 21–32)
CREAT SERPL-MCNC: 0.9 MG/DL (ref 0.6–1.3)
ERYTHROCYTE [DISTWIDTH] IN BLOOD BY AUTOMATED COUNT: 12.6 % (ref 11.6–15.1)
EST. AVERAGE GLUCOSE BLD GHB EST-MCNC: 103 MG/DL
GFR SERPL CREATININE-BSD FRML MDRD: 88 ML/MIN/1.73SQ M
GLUCOSE P FAST SERPL-MCNC: 90 MG/DL (ref 65–99)
HBA1C MFR BLD: 5.2 % (ref 4.2–6.3)
HCT VFR BLD AUTO: 46.9 % (ref 36.5–49.3)
HDLC SERPL-MCNC: 42 MG/DL (ref 40–60)
HGB BLD-MCNC: 16 G/DL (ref 12–17)
LDLC SERPL CALC-MCNC: 83 MG/DL (ref 0–100)
MCH RBC QN AUTO: 31.7 PG (ref 26.8–34.3)
MCHC RBC AUTO-ENTMCNC: 34.1 G/DL (ref 31.4–37.4)
MCV RBC AUTO: 93 FL (ref 82–98)
NONHDLC SERPL-MCNC: 104 MG/DL
PLATELET # BLD AUTO: 178 THOUSANDS/UL (ref 149–390)
PMV BLD AUTO: 9.7 FL (ref 8.9–12.7)
POTASSIUM SERPL-SCNC: 4.1 MMOL/L (ref 3.5–5.3)
PROT SERPL-MCNC: 7 G/DL (ref 6.4–8.2)
PSA SERPL-MCNC: 0.6 NG/ML (ref 0–4)
RBC # BLD AUTO: 5.05 MILLION/UL (ref 3.88–5.62)
SODIUM SERPL-SCNC: 136 MMOL/L (ref 136–145)
TRIGL SERPL-MCNC: 105 MG/DL
TSH SERPL DL<=0.05 MIU/L-ACNC: 1.53 UIU/ML (ref 0.36–3.74)
WBC # BLD AUTO: 5.31 THOUSAND/UL (ref 4.31–10.16)

## 2019-01-26 PROCEDURE — 85027 COMPLETE CBC AUTOMATED: CPT

## 2019-01-26 PROCEDURE — 36415 COLL VENOUS BLD VENIPUNCTURE: CPT

## 2019-01-26 PROCEDURE — G0103 PSA SCREENING: HCPCS

## 2019-01-26 PROCEDURE — 83036 HEMOGLOBIN GLYCOSYLATED A1C: CPT

## 2019-01-26 PROCEDURE — 80061 LIPID PANEL: CPT

## 2019-01-26 PROCEDURE — 80053 COMPREHEN METABOLIC PANEL: CPT

## 2019-01-26 PROCEDURE — 86803 HEPATITIS C AB TEST: CPT

## 2019-01-26 PROCEDURE — 84443 ASSAY THYROID STIM HORMONE: CPT

## 2019-01-27 LAB — HCV AB SER QL: NORMAL

## 2019-03-06 ENCOUNTER — OFFICE VISIT (OUTPATIENT)
Dept: INTERNAL MEDICINE CLINIC | Facility: CLINIC | Age: 69
End: 2019-03-06
Payer: MEDICARE

## 2019-03-06 VITALS
HEART RATE: 60 BPM | WEIGHT: 226.2 LBS | HEIGHT: 70 IN | SYSTOLIC BLOOD PRESSURE: 142 MMHG | DIASTOLIC BLOOD PRESSURE: 82 MMHG | BODY MASS INDEX: 32.38 KG/M2

## 2019-03-06 DIAGNOSIS — R73.01 IMPAIRED FASTING GLUCOSE: Primary | ICD-10-CM

## 2019-03-06 DIAGNOSIS — I10 HYPERTENSION, ESSENTIAL: ICD-10-CM

## 2019-03-06 DIAGNOSIS — I25.10 CORONARY ARTERY DISEASE INVOLVING NATIVE CORONARY ARTERY OF NATIVE HEART WITHOUT ANGINA PECTORIS: ICD-10-CM

## 2019-03-06 DIAGNOSIS — M25.552 PAIN OF LEFT HIP JOINT: ICD-10-CM

## 2019-03-06 DIAGNOSIS — E78.2 COMBINED HYPERLIPIDEMIA: ICD-10-CM

## 2019-03-06 DIAGNOSIS — L21.9 SEBORRHEIC DERMATITIS: ICD-10-CM

## 2019-03-06 PROCEDURE — G0439 PPPS, SUBSEQ VISIT: HCPCS | Performed by: INTERNAL MEDICINE

## 2019-03-06 PROCEDURE — 99214 OFFICE O/P EST MOD 30 MIN: CPT | Performed by: INTERNAL MEDICINE

## 2019-03-06 NOTE — PROGRESS NOTES
Assessment/Plan:    Patient Instructions   Lab data reviewed in detail and compared prior    Coronary artery disease stable without angina, continue to modify risk factors as below    Hypertension is suboptimally controlled today, no change in medication, check blood pressures a few times per week and call or send e-mail through my chart in the next 3 or 4 weeks to assure stability  Goal is to be consistently less than 135/85  Hyperlipidemia is at goal on atorvastatin    Hip pain with abduction-pathophysiology discussed, try some strengthening exercises for the abductors, consider physical therapy if symptoms fail to improve    Dermatitis consistent with seborrheic dermatitis-trial of hydrocortisone    Health maintenance is up-to-date, consider Shingrix     Advanced directives discussed, 5 wishes given    Routine follow-up after labs in 6 months, sooner as needed  Diagnoses and all orders for this visit:    Impaired fasting glucose  -     Hemoglobin A1C; Future    Coronary artery disease involving native coronary artery of native heart without angina pectoris    Hypertension, essential  -     CBC and differential; Future  -     Comprehensive metabolic panel; Future    Combined hyperlipidemia  -     Lipid panel; Future    Seborrheic dermatitis  -     hydrocortisone 2 5 % cream; Apply to affected areas in the ear and around the nose twice daily as needed    Pain of left hip joint         Subjective:      Patient ID: Margie Castorena is a 80 y o  male    Feeling well   Notes pain in L lateral hip only when getting out of car  Notes irritation at L auricle  Has used combo vinegar and alcohol  Exercising 3-4 days per week w/ cardio and wts  CAD-keeping active, exercising at least 5 d per week w/ cardio and wts    No angina  HTN/HPL-taking rx as directed, no home bps'           Current Outpatient Medications:     aspirin 81 MG tablet, Take 1 tablet by mouth daily, Disp: , Rfl:     atorvastatin (LIPITOR) 20 mg tablet, TAKE 1 TABLET EVERY DAY, Disp: 90 tablet, Rfl: 1    clopidogrel (PLAVIX) 75 mg tablet, Take 1 tablet (75 mg total) by mouth daily, Disp: 90 tablet, Rfl: 3    lisinopril (ZESTRIL) 10 mg tablet, TAKE 1 TABLET BY MOUTH EVERY DAY, Disp: 180 tablet, Rfl: 1    metoprolol succinate (TOPROL-XL) 50 mg 24 hr tablet, Take 1 tablet (50 mg total) by mouth daily, Disp: 90 tablet, Rfl: 3    nitroglycerin (NITROSTAT) 0 4 mg SL tablet, Place 1 tablet under the tongue as needed, Disp: , Rfl:     ciprofloxacin-dexamethasone (CIPRODEX) otic suspension, Administer 4 drops into the left ear 2 (two) times a day for 7 days, Disp: 7 5 mL, Rfl: 0    hydrocortisone 2 5 % cream, Apply to affected areas in the ear and around the nose twice daily as needed, Disp: 30 g, Rfl: 0    Recent Results (from the past 1008 hour(s))   CBC    Collection Time: 01/26/19  7:31 AM   Result Value Ref Range    WBC 5 31 4 31 - 10 16 Thousand/uL    RBC 5 05 3 88 - 5 62 Million/uL    Hemoglobin 16 0 12 0 - 17 0 g/dL    Hematocrit 46 9 36 5 - 49 3 %    MCV 93 82 - 98 fL    MCH 31 7 26 8 - 34 3 pg    MCHC 34 1 31 4 - 37 4 g/dL    RDW 12 6 11 6 - 15 1 %    Platelets 387 160 - 657 Thousands/uL    MPV 9 7 8 9 - 12 7 fL   Comprehensive metabolic panel    Collection Time: 01/26/19  7:31 AM   Result Value Ref Range    Sodium 136 136 - 145 mmol/L    Potassium 4 1 3 5 - 5 3 mmol/L    Chloride 106 100 - 108 mmol/L    CO2 24 21 - 32 mmol/L    ANION GAP 6 4 - 13 mmol/L    BUN 15 5 - 25 mg/dL    Creatinine 0 90 0 60 - 1 30 mg/dL    Glucose, Fasting 90 65 - 99 mg/dL    Calcium 8 5 8 3 - 10 1 mg/dL    AST 27 5 - 45 U/L    ALT 40 12 - 78 U/L    Alkaline Phosphatase 61 46 - 116 U/L    Total Protein 7 0 6 4 - 8 2 g/dL    Albumin 3 6 3 5 - 5 0 g/dL    Total Bilirubin 1 02 (H) 0 20 - 1 00 mg/dL    eGFR 88 ml/min/1 73sq m   Lipid panel    Collection Time: 01/26/19  7:31 AM   Result Value Ref Range    Cholesterol 146 50 - 200 mg/dL    Triglycerides 105 <=150 mg/dL    HDL, Direct 42 40 - 60 mg/dL    LDL Calculated 83 0 - 100 mg/dL    Non-HDL-Chol (CHOL-HDL) 104 mg/dl   TSH, 3rd generation with Free T4 reflex    Collection Time: 01/26/19  7:31 AM   Result Value Ref Range    TSH 3RD GENERATON 1 530 0 358 - 3 740 uIU/mL   PSA, Total Screen    Collection Time: 01/26/19  7:31 AM   Result Value Ref Range    PSA 0 6 0 0 - 4 0 ng/mL   Hepatitis C antibody    Collection Time: 01/26/19  7:31 AM   Result Value Ref Range    Hepatitis C Ab Non-reactive Non-reactive   Hemoglobin A1C    Collection Time: 01/26/19  7:31 AM   Result Value Ref Range    Hemoglobin A1C 5 2 4 2 - 6 3 %     mg/dl       The following portions of the patient's history were reviewed and updated as appropriate: allergies, current medications, past family history, past medical history, past social history, past surgical history and problem list      Review of Systems   Constitutional: Negative for appetite change, chills, diaphoresis, fatigue, fever and unexpected weight change  HENT: Negative for congestion, hearing loss and rhinorrhea  Eyes: Negative for visual disturbance  Respiratory: Negative for cough, chest tightness, shortness of breath and wheezing  Cardiovascular: Negative for chest pain, palpitations and leg swelling  Gastrointestinal: Negative for abdominal pain and blood in stool  Endocrine: Negative for cold intolerance, heat intolerance, polydipsia and polyuria  Genitourinary: Negative for difficulty urinating, dysuria, frequency and urgency  Musculoskeletal: Negative for arthralgias and myalgias  Skin: Negative for rash  Neurological: Negative for dizziness, weakness, light-headedness and headaches  Hematological: Does not bruise/bleed easily  Psychiatric/Behavioral: Negative for dysphoric mood and sleep disturbance  Objective:      Vitals:    03/06/19 1716   BP: 142/82   Pulse:           Physical Exam   Constitutional: He is oriented to person, place, and time   He appears well-developed and well-nourished  HENT:   Head: Normocephalic and atraumatic  Nose: Nose normal    Mouth/Throat: Oropharynx is clear and moist    External auditory canal notable for some erythema at the orifice with some whitish opacified wax in a small canal    Eyes: Pupils are equal, round, and reactive to light  Conjunctivae and EOM are normal  No scleral icterus  Neck: Normal range of motion  Neck supple  No JVD present  No tracheal deviation present  No thyromegaly present  Cardiovascular: Normal rate, regular rhythm and intact distal pulses  Exam reveals no gallop and no friction rub  No murmur heard  Pulmonary/Chest: Effort normal and breath sounds normal  No respiratory distress  He has no wheezes  He has no rales  Musculoskeletal: He exhibits no edema or deformity  No tenderness over left greater trochanter but tender to palpate superior to the greater trochanter made worse by abduction at the hip   Lymphadenopathy:     He has no cervical adenopathy  Neurological: He is alert and oriented to person, place, and time  No cranial nerve deficit  Skin: Skin is warm and dry  No rash noted  No erythema  No pallor  Psychiatric: He has a normal mood and affect   His behavior is normal  Judgment and thought content normal

## 2019-03-06 NOTE — PROGRESS NOTES
Assessment and Plan:    Problem List Items Addressed This Visit     None        Health Maintenance Due   Topic Date Due    Medicare Annual Wellness Visit (AWV)  1950    BMI: Followup Plan  10/23/1969    DTaP,Tdap,and Td Vaccines (1 - Tdap) 10/23/1972         HPI:  Tanisha Brown is a 80 y o  male here for his Subsequent Wellness Visit      Patient Active Problem List   Diagnosis    Coronary artery disease involving native coronary artery of native heart without angina pectoris    Hypertension, essential    Combined hyperlipidemia    Impaired fasting glucose    Seborrheic keratosis    Screening for skin condition    History of skin cancer     Past Medical History:   Diagnosis Date    Abnormal LFTs     Benign neoplasm of skin     Lumbar canal stenosis     with neurogenic claudication     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, congenital     Nephrotic syndrome     Nonmelanoma skin cancer     last assessed 1/11/18    Old myocardial infarction     Skin cancer     last assessed 1/13/14    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      Past Surgical History:   Procedure Laterality Date    ATHERECTOMY      1 with stent placement  last assessed 6/17/14    CARDIAC CATHETERIZATION      outcome: successful  last assessed 6/17/14    CORONARY ANGIOPLASTY      CORONARY ANGIOPLASTY WITH STENT PLACEMENT      to LAD, diagonal and RCA    MALIGNANT SKIN LESION EXCISION  01/09/2012    Trunk,  SCC chest      Family History   Problem Relation Age of Onset    Heart attack Father 50    Cancer Father     Coronary artery disease Family      Social History     Tobacco Use   Smoking Status Never Smoker   Smokeless Tobacco Never Used     Social History     Substance and Sexual Activity   Alcohol Use Yes    Alcohol/week: 1 2 oz    Types: 2 Shots of liquor per week    Comment: Wine consumption (__glasses/day)       Social History     Substance and Sexual Activity   Drug Use No       Current Outpatient Medications   Medication Sig Dispense Refill    aspirin 81 MG tablet Take 1 tablet by mouth daily      atorvastatin (LIPITOR) 20 mg tablet TAKE 1 TABLET EVERY DAY 90 tablet 1    ciprofloxacin-dexamethasone (CIPRODEX) otic suspension Administer 4 drops into the left ear 2 (two) times a day for 7 days 7 5 mL 0    clopidogrel (PLAVIX) 75 mg tablet Take 1 tablet (75 mg total) by mouth daily 90 tablet 3    lisinopril (ZESTRIL) 10 mg tablet TAKE 1 TABLET BY MOUTH EVERY  tablet 1    metoprolol succinate (TOPROL-XL) 50 mg 24 hr tablet Take 1 tablet (50 mg total) by mouth daily 90 tablet 3    nitroglycerin (NITROSTAT) 0 4 mg SL tablet Place 1 tablet under the tongue as needed       No current facility-administered medications for this visit  No Known Allergies  Immunization History   Administered Date(s) Administered    INFLUENZA 11/11/2013, 11/19/2014, 11/09/2015, 08/10/2017, 11/07/2018    Influenza Quadrivalent, 6-35 Months IM 11/19/2014, 11/09/2015, 08/10/2017    Pneumococcal Conjugate 13-Valent 01/24/2017    Pneumococcal Polysaccharide PPV23 1950, 07/24/2018    Tdap 1950       Patient Care Team:  Jose Singh MD as PCP - General  MD Nancy Dumont MD    Medicare Screening Tests and Risk Assessments:  Tariq Hudson is here for his Subsequent Wellness visit  Health Risk Assessment:  Patient rates overall health as excellent  Patient feels that their physical health rating is Same  Eyesight was rated as Same  Hearing was rated as Same  Patient feels that their emotional and mental health rating is Same  Pain experienced by patient in the last 7 days has been Some  Patient's pain rating has been 3/10  Emotional/Mental Health:  Patient has been feeling nervous/anxious  PHQ-9 Depression Screening:    Frequency of the following problems over the past two weeks:      1  Little interest or pleasure in doing things: 0 - not at all      2   Feeling down, depressed, or hopeless: 0 - not at all  PHQ-2 Score: 0          Broken Bones/Falls: Fall Risk Assessment:    In the past year, patient has experienced: No history of falling in past year          Bladder/Bowel:  Patient has not leaked urine accidently in the last six months  Patient reports no loss of bowel control  Immunizations:  Patient has had a flu vaccination within the last year  Patient has received a pneumonia shot  Patient has not received a shingles shot  Home Safety:  Patient does not have trouble with stairs inside or outside of their home  Patient currently reports that there are safety hazards present in home , no working smoke alarms, no working carbon monoxide detectors  Preventative Screenings:   no prostate cancer screen performed, colon cancer screen completed, cholesterol screen completed, 1/1/2019  glaucoma eye exam completed, 12/1/2018      Nutrition:  Current diet: Regular with servings of the following:    Medications:  Patient is currently taking over-the-counter supplements  Patient is not able to manage medications  Lifestyle Choices:  Patient reports no tobacco use  Patient has not smoked or used tobacco in the past   Patient reports alcohol use  Alcohol use per week: 10  Patient drives a vehicle  Patient wears seat belt  Current level of exercise of physical activity described by patient as: gym 3-4 days a weeks lots of outdoor activity           Activities of Daily Living:  Can get out of bed by his or her self, able to dress self, able to make own meals, able to do own shopping, able to bathe self, can do own laundry/housekeeping, can manage own money, pay bills and track expenses    Previous Hospitalizations:  No hospitalization or ED visit in past 12 months        Advanced Directives:  Patient has decided on a power of   Patient has spoken to designated power of   Patient has not completed advanced directive          Preventative Screening/Counseling:      Cardiovascular: General: Screening Current          Diabetes:      General: Screening Current          Colorectal Cancer:      General: Screening Current          Prostate Cancer:      General: Screening Current          Osteoporosis:      General: Screening Not Indicated          AAA:      General: Screening Not Indicated          Glaucoma:      General: Screening Current          HIV:      General: Screening Not Indicated          Hepatitis C:      General: Screening Current        Advanced Directives:   Patient has living will for healthcare, has durable POA for healthcare, patient does not have an advanced directive  Information on ACP and/or AD provided  5 wishes given  End of life assessment reviewed with patient

## 2019-03-06 NOTE — PATIENT INSTRUCTIONS
Lab data reviewed in detail and compared prior    Coronary artery disease stable without angina, continue to modify risk factors as below    Hypertension is suboptimally controlled today, no change in medication, check blood pressures a few times per week and call or send e-mail through my chart in the next 3 or 4 weeks to assure stability  Goal is to be consistently less than 135/85  Hyperlipidemia is at goal on atorvastatin    Hip pain with abduction-pathophysiology discussed, try some strengthening exercises for the abductors, consider physical therapy if symptoms fail to improve    Dermatitis consistent with seborrheic dermatitis-trial of hydrocortisone    Health maintenance is up-to-date, consider Shingrix     Advanced directives discussed, 5 wishes given    Routine follow-up after labs in 6 months, sooner as needed

## 2019-03-08 ENCOUNTER — OFFICE VISIT (OUTPATIENT)
Dept: DERMATOLOGY | Facility: CLINIC | Age: 69
End: 2019-03-08
Payer: MEDICARE

## 2019-03-08 DIAGNOSIS — Z13.89 SCREENING FOR SKIN CONDITION: ICD-10-CM

## 2019-03-08 DIAGNOSIS — Z85.828 HISTORY OF SKIN CANCER: ICD-10-CM

## 2019-03-08 DIAGNOSIS — L57.0 ACTINIC KERATOSIS: ICD-10-CM

## 2019-03-08 DIAGNOSIS — L82.1 SEBORRHEIC KERATOSIS: Primary | ICD-10-CM

## 2019-03-08 PROCEDURE — 17000 DESTRUCT PREMALG LESION: CPT | Performed by: DERMATOLOGY

## 2019-03-08 PROCEDURE — 17003 DESTRUCT PREMALG LES 2-14: CPT | Performed by: DERMATOLOGY

## 2019-03-08 PROCEDURE — 99213 OFFICE O/P EST LOW 20 MIN: CPT | Performed by: DERMATOLOGY

## 2019-03-08 NOTE — PATIENT INSTRUCTIONS
Actinic Keratosis:  Patient advised lesions are precancers  These should resolve with cryosurgery if not to let us know sun block recommended  Seborrheic keratosis patient reassured these are normal growths we acquire with age no treatment needed  History of skin cancer in no recurrence nothing else atypical sunblock recommended follow-up in 6 months  Screening for dermatologic disorders nothing else of concern noted on complete exam follow-up in 6 months need to keep an eye on lesion on the back and shoulder if they do not resolve will need to biopsy in the future  Treatment with Cryotherapy    The doctor has treated your skin with nitrogen, which is 320 degrees Fahrenheit below zero  He has given the treated area "frostbite "    Stinging should subside within a few hours  You can take Tylenol for pain, if needed  Over the next few days, it is normal if the area becomes reddened, a blood blister, or swollen with fluid  If the lesion treated was near the eye - you could get a swollen eye over the next few days  Do not panic! This is all temporary, and will resolve with time  There is no special treatment - just keep the area clean  Makeup and BandAids can be used, if preferred  When the area starts to dry up and peel off, using Vaseline can help healing  It usually takes up to a month for it to heal   Some lesions are recurrent and may require repeat treatments  If a lesion has not healed in one month, please don't hesitate to contact us  If you have any further questions that are not answered here, please call us  [phone]    Thank you for allowing us to care for you

## 2019-03-08 NOTE — PROGRESS NOTES
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, congenital     Nephrotic syndrome     Nonmelanoma skin cancer     last assessed 18    Old myocardial infarction     Skin cancer     last assessed 14    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      Past Surgical History:   Procedure Laterality Date    ATHERECTOMY      1 with stent placement  last assessed 14    CARDIAC CATHETERIZATION      outcome: successful  last assessed 14    CORONARY ANGIOPLASTY      CORONARY ANGIOPLASTY WITH STENT PLACEMENT      to LAD, diagonal and RCA    MALIGNANT SKIN LESION EXCISION  2012    Trunk,  SCC chest      Social History   Social History     Substance and Sexual Activity   Alcohol Use Yes    Alcohol/week: 1 2 oz    Types: 2 Shots of liquor per week    Comment: Wine consumption (__glasses/day)      Social History     Substance and Sexual Activity   Drug Use No     Social History     Tobacco Use   Smoking Status Never Smoker   Smokeless Tobacco Never Used     Family History   Problem Relation Age of Onset    Heart attack Father 50    Cancer Father     Coronary artery disease Family      Meds/Allergies   No Known Allergies    Meds:  Prior to Admission medications    Medication Sig Start Date End Date Taking?  Authorizing Provider   aspirin 81 MG tablet Take 1 tablet by mouth daily   Yes Historical Provider, MD   atorvastatin (LIPITOR) 20 mg tablet TAKE 1 TABLET EVERY DAY 5/20/18  Yes Radha Gupta MD   ciprofloxacin-dexamethasone (CIPRODEX) otic suspension Administer 4 drops into the left ear 2 (two) times a day for 7 days 8/1/18 3/8/19 Yes Ricki Velasquez PA-C   clopidogrel (PLAVIX) 75 mg tablet Take 1 tablet (75 mg total) by mouth daily 5/3/18  Yes Radha Gupta MD   hydrocortisone 2 5 % cream Apply to affected areas in the ear and around the nose twice daily as needed 3/6/19  Yes Hilda Shanks MD   lisinopril (ZESTRIL) 10 mg tablet TAKE 1 TABLET BY MOUTH EVERY DAY 9/20/18  Yes BAR Fenton   metoprolol succinate (TOPROL-XL) 50 mg 24 hr tablet Take 1 tablet (50 mg total) by mouth daily 5/3/18  Yes Radha Gupta MD   nitroglycerin (NITROSTAT) 0 4 mg SL tablet Place 1 tablet under the tongue as needed 5/15/14  Yes Historical Provider, MD       Subjective:     Review of Systems:    General: negative for - chills, fatigue, fever,  weight gain or weight loss  Psychological: negative for - anxiety, behavioral disorder, concentration difficulties, decreased libido, depression, irritability, memory difficulties, mood swings, sleep disturbances or suicidal ideation  ENT: negative for - hearing difficulties , nasal congestion, nasal discharge, oral lesions, sinus pain, sneezing, sore throat  Allergy and Immunology: negative for - hives, insect bite sensitivity,  Hematological and Lymphatic: negative for - bleeding problems, blood clots,bruising, swollen lymph nodes  Endocrine: negative for - hair pattern changes, hot flashes, malaise/lethargy, mood swings, palpitations, polydipsia/polyuria, skin changes, temperature intolerance or unexpected weight change  Respiratory: negative for - cough, hemoptysis, orthopnea, shortness of breath, or wheezing  Cardiovascular: negative for - chest pain, dyspnea on exertion, edema,  Gastrointestinal: negative for - abdominal pain, nausea/vomiting  Genito-Urinary: negative for - dysuria, incontinence, irregular/heavy menses or urinary frequency/urgency  Musculoskeletal: negative for - gait disturbance, joint pain, joint stiffness, joint swelling, muscle pain, muscular weakness  Dermatological:  As in HPI  Neurological: negative for confusion, dizziness, headaches, impaired coordination/balance, memory loss, numbness/tingling, seizures, speech problems, tremors or weakness       Objective: There were no vitals taken for this visit  Physical Exam:    General Appearance:    Alert, cooperative, no distress   Head:    Normocephalic, without obvious abnormality, atraumatic           Skin:   A full skin exam was performed including scalp, head scalp, eyes, ears, nose, lips, neck, chest, axilla, abdomen, back, buttocks, bilateral upper extremities, bilateral lower extremities, hands, feet, fingers, toes, fingernails, and toenails scaling erythematous areas noted below normal keratotic papules with greasy stuck on appearance previous sites of skin cancer well healed without recurrence nothing else atypical noted exam except excoriation noted on the left upper back and left shoulder  Physical Exam   HENT:   Head:          Cryotherapy Procedure Note    Pre-operative Diagnosis: actinic keratosis    Plan:  1  Instructed to keep the area dry and clean thereafter  Apply petrolatum if area gets crusty  2  Recommended that the patient use acetaminophen  as needed for pain  Locations:  Face    Indications: Destruction of actinic keratosis x2    Patient informed of risks (permanent scarring, infection, light or dark discoloration, bleeding, infection, weakness, numbness and recurrence of the lesion) and benefits of the procedure and verbal informed consent obtained  The areas are treated with liquid nitrogen therapy, frozen until ice ball extended 2 mm beyond lesion, allowed to thaw, and treated again  The patient tolerated procedure well  The patient was instructed on post-op care, warned that there may be blister formation, redness and pain  Recommend OTC analgesia as needed for pain  Condition:  Stable    Complications:  none  Assessment:     1  Seborrheic keratosis     2  Screening for skin condition     3  History of skin cancer     4  Actinic keratosis           Plan:   Actinic Keratosis:  Patient advised lesions are precancers  These should resolve with cryosurgery if not to let us know sun block recommended  Seborrheic keratosis patient reassured these are normal growths we acquire with age no treatment needed  History of skin cancer in no recurrence nothing else atypical sunblock recommended follow-up in 6 months  Screening for dermatologic disorders nothing else of concern noted on complete exam follow-up in 6 months need to keep an eye on lesion on the back and shoulder if they do not resolve will need to biopsy in the future    Brianna Carreon MD  3/8/2019,9:53 AM    Portions of the record may have been created with voice recognition software   Occasional wrong word or "sound a like" substitutions may have occurred due to the inherent limitations of voice recognition software   Read the chart carefully and recognize, using context, where substitutions have occurred

## 2019-04-24 ENCOUNTER — OFFICE VISIT (OUTPATIENT)
Dept: URGENT CARE | Facility: CLINIC | Age: 69
End: 2019-04-24
Payer: MEDICARE

## 2019-04-24 VITALS
RESPIRATION RATE: 16 BRPM | BODY MASS INDEX: 32.78 KG/M2 | HEIGHT: 70 IN | TEMPERATURE: 97.6 F | HEART RATE: 67 BPM | DIASTOLIC BLOOD PRESSURE: 86 MMHG | SYSTOLIC BLOOD PRESSURE: 124 MMHG | OXYGEN SATURATION: 96 % | WEIGHT: 229 LBS

## 2019-04-24 DIAGNOSIS — Z01.30 BLOOD PRESSURE CHECK: Primary | ICD-10-CM

## 2019-04-24 PROCEDURE — G0463 HOSPITAL OUTPT CLINIC VISIT: HCPCS | Performed by: PHYSICIAN ASSISTANT

## 2019-04-24 PROCEDURE — 99213 OFFICE O/P EST LOW 20 MIN: CPT | Performed by: PHYSICIAN ASSISTANT

## 2019-04-25 ENCOUNTER — TELEPHONE (OUTPATIENT)
Dept: CARDIOLOGY CLINIC | Facility: CLINIC | Age: 69
End: 2019-04-25

## 2019-04-25 ENCOUNTER — OFFICE VISIT (OUTPATIENT)
Dept: CARDIOLOGY CLINIC | Facility: CLINIC | Age: 69
End: 2019-04-25
Payer: MEDICARE

## 2019-04-25 VITALS
OXYGEN SATURATION: 97 % | HEIGHT: 70 IN | WEIGHT: 226 LBS | SYSTOLIC BLOOD PRESSURE: 118 MMHG | BODY MASS INDEX: 32.35 KG/M2 | HEART RATE: 68 BPM | DIASTOLIC BLOOD PRESSURE: 74 MMHG

## 2019-04-25 DIAGNOSIS — I10 HYPERTENSION, ESSENTIAL: ICD-10-CM

## 2019-04-25 DIAGNOSIS — E78.2 COMBINED HYPERLIPIDEMIA: ICD-10-CM

## 2019-04-25 DIAGNOSIS — I25.10 CORONARY ARTERY DISEASE INVOLVING NATIVE CORONARY ARTERY OF NATIVE HEART WITHOUT ANGINA PECTORIS: Primary | ICD-10-CM

## 2019-04-25 PROCEDURE — 99213 OFFICE O/P EST LOW 20 MIN: CPT | Performed by: INTERNAL MEDICINE

## 2019-05-12 DIAGNOSIS — E78.2 COMBINED HYPERLIPIDEMIA: ICD-10-CM

## 2019-05-12 RX ORDER — ATORVASTATIN CALCIUM 20 MG/1
TABLET, FILM COATED ORAL
Qty: 90 TABLET | Refills: 1 | Status: SHIPPED | OUTPATIENT
Start: 2019-05-12 | End: 2019-08-28 | Stop reason: SDUPTHER

## 2019-05-28 ENCOUNTER — TELEPHONE (OUTPATIENT)
Dept: INTERNAL MEDICINE CLINIC | Facility: CLINIC | Age: 69
End: 2019-05-28

## 2019-05-28 DIAGNOSIS — I25.10 CORONARY ARTERY DISEASE, ANGINA PRESENCE UNSPECIFIED, UNSPECIFIED VESSEL OR LESION TYPE, UNSPECIFIED WHETHER NATIVE OR TRANSPLANTED HEART: ICD-10-CM

## 2019-05-28 DIAGNOSIS — I10 BENIGN ESSENTIAL HTN: ICD-10-CM

## 2019-05-28 RX ORDER — METOPROLOL SUCCINATE 50 MG/1
50 TABLET, EXTENDED RELEASE ORAL DAILY
Qty: 90 TABLET | Refills: 3 | Status: SHIPPED | OUTPATIENT
Start: 2019-05-28 | End: 2020-05-20

## 2019-05-28 RX ORDER — CLOPIDOGREL BISULFATE 75 MG/1
75 TABLET ORAL DAILY
Qty: 90 TABLET | Refills: 3 | Status: SHIPPED | OUTPATIENT
Start: 2019-05-28 | End: 2020-05-20

## 2019-06-28 ENCOUNTER — OFFICE VISIT (OUTPATIENT)
Dept: URGENT CARE | Facility: CLINIC | Age: 69
End: 2019-06-28
Payer: MEDICARE

## 2019-06-28 ENCOUNTER — OFFICE VISIT (OUTPATIENT)
Dept: INTERNAL MEDICINE CLINIC | Facility: CLINIC | Age: 69
End: 2019-06-28
Payer: MEDICARE

## 2019-06-28 ENCOUNTER — TELEPHONE (OUTPATIENT)
Dept: INTERNAL MEDICINE CLINIC | Facility: CLINIC | Age: 69
End: 2019-06-28

## 2019-06-28 ENCOUNTER — APPOINTMENT (OUTPATIENT)
Dept: LAB | Facility: CLINIC | Age: 69
End: 2019-06-28
Payer: MEDICARE

## 2019-06-28 VITALS
HEART RATE: 60 BPM | OXYGEN SATURATION: 99 % | WEIGHT: 228.2 LBS | DIASTOLIC BLOOD PRESSURE: 96 MMHG | SYSTOLIC BLOOD PRESSURE: 140 MMHG | BODY MASS INDEX: 32.67 KG/M2 | TEMPERATURE: 97.1 F | HEIGHT: 70 IN

## 2019-06-28 VITALS
SYSTOLIC BLOOD PRESSURE: 171 MMHG | OXYGEN SATURATION: 97 % | HEART RATE: 66 BPM | DIASTOLIC BLOOD PRESSURE: 96 MMHG | HEIGHT: 70 IN | BODY MASS INDEX: 32.35 KG/M2 | TEMPERATURE: 96.6 F | WEIGHT: 226 LBS

## 2019-06-28 DIAGNOSIS — R10.9 RIGHT FLANK PAIN: Primary | ICD-10-CM

## 2019-06-28 DIAGNOSIS — M54.50 ACUTE BILATERAL LOW BACK PAIN WITHOUT SCIATICA: ICD-10-CM

## 2019-06-28 DIAGNOSIS — M54.9 ACUTE RIGHT-SIDED BACK PAIN, UNSPECIFIED BACK LOCATION: Primary | ICD-10-CM

## 2019-06-28 DIAGNOSIS — R10.9 ABDOMINAL PAIN, UNSPECIFIED ABDOMINAL LOCATION: ICD-10-CM

## 2019-06-28 DIAGNOSIS — R10.9 RIGHT FLANK PAIN: ICD-10-CM

## 2019-06-28 DIAGNOSIS — I10 HYPERTENSION, UNSPECIFIED TYPE: ICD-10-CM

## 2019-06-28 LAB
ALBUMIN SERPL BCP-MCNC: 3.9 G/DL (ref 3.5–5)
ALP SERPL-CCNC: 76 U/L (ref 46–116)
ALT SERPL W P-5'-P-CCNC: 40 U/L (ref 12–78)
ANION GAP SERPL CALCULATED.3IONS-SCNC: 5 MMOL/L (ref 4–13)
AST SERPL W P-5'-P-CCNC: 22 U/L (ref 5–45)
BASOPHILS # BLD AUTO: 0.1 THOUSANDS/ΜL (ref 0–0.1)
BASOPHILS NFR BLD AUTO: 2 % (ref 0–1)
BILIRUB SERPL-MCNC: 1.16 MG/DL (ref 0.2–1)
BUN SERPL-MCNC: 16 MG/DL (ref 5–25)
CALCIUM SERPL-MCNC: 9.1 MG/DL (ref 8.3–10.1)
CHLORIDE SERPL-SCNC: 104 MMOL/L (ref 100–108)
CO2 SERPL-SCNC: 28 MMOL/L (ref 21–32)
CREAT SERPL-MCNC: 0.88 MG/DL (ref 0.6–1.3)
EOSINOPHIL # BLD AUTO: 0.34 THOUSAND/ΜL (ref 0–0.61)
EOSINOPHIL NFR BLD AUTO: 6 % (ref 0–6)
ERYTHROCYTE [DISTWIDTH] IN BLOOD BY AUTOMATED COUNT: 12.1 % (ref 11.6–15.1)
GFR SERPL CREATININE-BSD FRML MDRD: 89 ML/MIN/1.73SQ M
GLUCOSE P FAST SERPL-MCNC: 94 MG/DL (ref 65–99)
HCT VFR BLD AUTO: 49.1 % (ref 36.5–49.3)
HGB BLD-MCNC: 17.1 G/DL (ref 12–17)
IMM GRANULOCYTES # BLD AUTO: 0.01 THOUSAND/UL (ref 0–0.2)
IMM GRANULOCYTES NFR BLD AUTO: 0 % (ref 0–2)
LYMPHOCYTES # BLD AUTO: 1.9 THOUSANDS/ΜL (ref 0.6–4.47)
LYMPHOCYTES NFR BLD AUTO: 31 % (ref 14–44)
MCH RBC QN AUTO: 31.8 PG (ref 26.8–34.3)
MCHC RBC AUTO-ENTMCNC: 34.8 G/DL (ref 31.4–37.4)
MCV RBC AUTO: 91 FL (ref 82–98)
MONOCYTES # BLD AUTO: 0.81 THOUSAND/ΜL (ref 0.17–1.22)
MONOCYTES NFR BLD AUTO: 13 % (ref 4–12)
NEUTROPHILS # BLD AUTO: 3.05 THOUSANDS/ΜL (ref 1.85–7.62)
NEUTS SEG NFR BLD AUTO: 48 % (ref 43–75)
NRBC BLD AUTO-RTO: 0 /100 WBCS
PLATELET # BLD AUTO: 185 THOUSANDS/UL (ref 149–390)
PMV BLD AUTO: 9.7 FL (ref 8.9–12.7)
POTASSIUM SERPL-SCNC: 4.6 MMOL/L (ref 3.5–5.3)
PROT SERPL-MCNC: 7.3 G/DL (ref 6.4–8.2)
RBC # BLD AUTO: 5.37 MILLION/UL (ref 3.88–5.62)
SL AMB  POCT GLUCOSE, UA: NORMAL
SL AMB LEUKOCYTE ESTERASE,UA: NORMAL
SL AMB POCT BILIRUBIN,UA: NORMAL
SL AMB POCT BLOOD,UA: NORMAL
SL AMB POCT CLARITY,UA: CLEAR
SL AMB POCT COLOR,UA: YELLOW
SL AMB POCT KETONES,UA: NORMAL
SL AMB POCT NITRITE,UA: NORMAL
SL AMB POCT PH,UA: 6
SL AMB POCT SPECIFIC GRAVITY,UA: 1.03
SL AMB POCT URINE PROTEIN: NORMAL
SL AMB POCT UROBILINOGEN: 0.2
SODIUM SERPL-SCNC: 137 MMOL/L (ref 136–145)
WBC # BLD AUTO: 6.21 THOUSAND/UL (ref 4.31–10.16)

## 2019-06-28 PROCEDURE — 80053 COMPREHEN METABOLIC PANEL: CPT

## 2019-06-28 PROCEDURE — 99214 OFFICE O/P EST MOD 30 MIN: CPT | Performed by: PHYSICIAN ASSISTANT

## 2019-06-28 PROCEDURE — 36415 COLL VENOUS BLD VENIPUNCTURE: CPT

## 2019-06-28 PROCEDURE — G0463 HOSPITAL OUTPT CLINIC VISIT: HCPCS | Performed by: PHYSICIAN ASSISTANT

## 2019-06-28 PROCEDURE — 81002 URINALYSIS NONAUTO W/O SCOPE: CPT | Performed by: PHYSICIAN ASSISTANT

## 2019-06-28 PROCEDURE — 99213 OFFICE O/P EST LOW 20 MIN: CPT | Performed by: PHYSICIAN ASSISTANT

## 2019-06-28 PROCEDURE — 85025 COMPLETE CBC W/AUTO DIFF WBC: CPT

## 2019-06-28 PROCEDURE — 87086 URINE CULTURE/COLONY COUNT: CPT | Performed by: PHYSICIAN ASSISTANT

## 2019-06-29 LAB — BACTERIA UR CULT: NORMAL

## 2019-07-01 ENCOUNTER — HOSPITAL ENCOUNTER (OUTPATIENT)
Dept: ULTRASOUND IMAGING | Facility: CLINIC | Age: 69
Discharge: HOME | End: 2019-07-01
Payer: MEDICARE

## 2019-07-01 DIAGNOSIS — R10.9 RIGHT FLANK PAIN: ICD-10-CM

## 2019-07-01 PROCEDURE — 76770 US EXAM ABDO BACK WALL COMP: CPT

## 2019-07-02 ENCOUNTER — TELEPHONE (OUTPATIENT)
Dept: INTERNAL MEDICINE CLINIC | Facility: CLINIC | Age: 69
End: 2019-07-02

## 2019-07-02 NOTE — TELEPHONE ENCOUNTER
----- [facility] 7[facility]2/2019  6:16 PM EDT -----  Please call the patient and let him know that his ultrasound of the kidney and bladder does not show any evidence of kidney stones  The urine culture that he did at the urgent care was negative  It does show an enlarged prostate but this is not the cause of his flank pain  He should probably follow up with a urologist when he comes back from his vacation    If he needs a referral, let us know

## 2019-07-03 NOTE — TELEPHONE ENCOUNTER
4th attempt  Re:  Kidney U/S / Prostate  LVM on home phone asking pt to return call to discuss "test" results/instructions and/or follow-up  "Other" phone rings, no ans, no vm  No details given

## 2019-07-08 NOTE — TELEPHONE ENCOUNTER
5th attempt- if no return call mail letter to patient with providers message and instructions  Re:  Test results  LVM asking pt to return call to discuss "test" results/instructions and/or follow-up  No details given

## 2019-08-28 ENCOUNTER — APPOINTMENT (OUTPATIENT)
Dept: LAB | Facility: CLINIC | Age: 69
End: 2019-08-28
Payer: MEDICARE

## 2019-08-28 DIAGNOSIS — E78.2 COMBINED HYPERLIPIDEMIA: ICD-10-CM

## 2019-08-28 DIAGNOSIS — I10 HYPERTENSION, ESSENTIAL: ICD-10-CM

## 2019-08-28 DIAGNOSIS — R73.01 IMPAIRED FASTING GLUCOSE: ICD-10-CM

## 2019-08-28 LAB
ALBUMIN SERPL BCP-MCNC: 4.3 G/DL (ref 3.5–5)
ALP SERPL-CCNC: 73 U/L (ref 46–116)
ALT SERPL W P-5'-P-CCNC: 42 U/L (ref 12–78)
ANION GAP SERPL CALCULATED.3IONS-SCNC: 10 MMOL/L (ref 4–13)
AST SERPL W P-5'-P-CCNC: 25 U/L (ref 5–45)
BASOPHILS # BLD AUTO: 0.07 THOUSANDS/ΜL (ref 0–0.1)
BASOPHILS NFR BLD AUTO: 1 % (ref 0–1)
BILIRUB SERPL-MCNC: 1.43 MG/DL (ref 0.2–1)
BUN SERPL-MCNC: 21 MG/DL (ref 5–25)
CALCIUM SERPL-MCNC: 9.4 MG/DL (ref 8.3–10.1)
CHLORIDE SERPL-SCNC: 110 MMOL/L (ref 100–108)
CHOLEST SERPL-MCNC: 148 MG/DL (ref 50–200)
CO2 SERPL-SCNC: 23 MMOL/L (ref 21–32)
CREAT SERPL-MCNC: 0.93 MG/DL (ref 0.6–1.3)
EOSINOPHIL # BLD AUTO: 0.46 THOUSAND/ΜL (ref 0–0.61)
EOSINOPHIL NFR BLD AUTO: 8 % (ref 0–6)
ERYTHROCYTE [DISTWIDTH] IN BLOOD BY AUTOMATED COUNT: 12.5 % (ref 11.6–15.1)
EST. AVERAGE GLUCOSE BLD GHB EST-MCNC: 100 MG/DL
GFR SERPL CREATININE-BSD FRML MDRD: 85 ML/MIN/1.73SQ M
GLUCOSE P FAST SERPL-MCNC: 87 MG/DL (ref 65–99)
HBA1C MFR BLD: 5.1 % (ref 4.2–6.3)
HCT VFR BLD AUTO: 50 % (ref 36.5–49.3)
HDLC SERPL-MCNC: 49 MG/DL (ref 40–60)
HGB BLD-MCNC: 17.2 G/DL (ref 12–17)
IMM GRANULOCYTES # BLD AUTO: 0.01 THOUSAND/UL (ref 0–0.2)
IMM GRANULOCYTES NFR BLD AUTO: 0 % (ref 0–2)
LDLC SERPL CALC-MCNC: 84 MG/DL (ref 0–100)
LYMPHOCYTES # BLD AUTO: 2.15 THOUSANDS/ΜL (ref 0.6–4.47)
LYMPHOCYTES NFR BLD AUTO: 35 % (ref 14–44)
MCH RBC QN AUTO: 31.6 PG (ref 26.8–34.3)
MCHC RBC AUTO-ENTMCNC: 34.4 G/DL (ref 31.4–37.4)
MCV RBC AUTO: 92 FL (ref 82–98)
MONOCYTES # BLD AUTO: 0.83 THOUSAND/ΜL (ref 0.17–1.22)
MONOCYTES NFR BLD AUTO: 14 % (ref 4–12)
NEUTROPHILS # BLD AUTO: 2.63 THOUSANDS/ΜL (ref 1.85–7.62)
NEUTS SEG NFR BLD AUTO: 42 % (ref 43–75)
NONHDLC SERPL-MCNC: 99 MG/DL
NRBC BLD AUTO-RTO: 0 /100 WBCS
PLATELET # BLD AUTO: 175 THOUSANDS/UL (ref 149–390)
PMV BLD AUTO: 10.4 FL (ref 8.9–12.7)
POTASSIUM SERPL-SCNC: 4.1 MMOL/L (ref 3.5–5.3)
PROT SERPL-MCNC: 7.3 G/DL (ref 6.4–8.2)
RBC # BLD AUTO: 5.45 MILLION/UL (ref 3.88–5.62)
SODIUM SERPL-SCNC: 143 MMOL/L (ref 136–145)
TRIGL SERPL-MCNC: 76 MG/DL
WBC # BLD AUTO: 6.15 THOUSAND/UL (ref 4.31–10.16)

## 2019-08-28 PROCEDURE — 36415 COLL VENOUS BLD VENIPUNCTURE: CPT

## 2019-08-28 PROCEDURE — 80053 COMPREHEN METABOLIC PANEL: CPT

## 2019-08-28 PROCEDURE — 83036 HEMOGLOBIN GLYCOSYLATED A1C: CPT

## 2019-08-28 PROCEDURE — 85025 COMPLETE CBC W/AUTO DIFF WBC: CPT

## 2019-08-28 PROCEDURE — 80061 LIPID PANEL: CPT

## 2019-08-28 RX ORDER — ATORVASTATIN CALCIUM 20 MG/1
20 TABLET, FILM COATED ORAL DAILY
Qty: 90 TABLET | Refills: 1 | Status: CANCELLED | OUTPATIENT
Start: 2019-08-28

## 2019-08-28 RX ORDER — ATORVASTATIN CALCIUM 20 MG/1
20 TABLET, FILM COATED ORAL DAILY
Qty: 90 TABLET | Refills: 3 | Status: SHIPPED | OUTPATIENT
Start: 2019-08-28 | End: 2020-05-31

## 2019-09-21 DIAGNOSIS — I10 ESSENTIAL HYPERTENSION: ICD-10-CM

## 2019-09-23 RX ORDER — LISINOPRIL 10 MG/1
TABLET ORAL
Qty: 90 TABLET | Refills: 3 | Status: SHIPPED | OUTPATIENT
Start: 2019-09-23 | End: 2020-10-30 | Stop reason: SDUPTHER

## 2019-09-25 ENCOUNTER — APPOINTMENT (OUTPATIENT)
Dept: LAB | Facility: CLINIC | Age: 69
End: 2019-09-25
Payer: MEDICARE

## 2019-09-25 ENCOUNTER — OFFICE VISIT (OUTPATIENT)
Dept: INTERNAL MEDICINE CLINIC | Facility: CLINIC | Age: 69
End: 2019-09-25
Payer: MEDICARE

## 2019-09-25 ENCOUNTER — OFFICE VISIT (OUTPATIENT)
Dept: DERMATOLOGY | Facility: CLINIC | Age: 69
End: 2019-09-25
Payer: MEDICARE

## 2019-09-25 VITALS
RESPIRATION RATE: 14 BRPM | BODY MASS INDEX: 33.04 KG/M2 | WEIGHT: 230.8 LBS | HEIGHT: 70 IN | DIASTOLIC BLOOD PRESSURE: 90 MMHG | HEART RATE: 64 BPM | SYSTOLIC BLOOD PRESSURE: 130 MMHG

## 2019-09-25 DIAGNOSIS — Z23 ENCOUNTER FOR IMMUNIZATION: Primary | ICD-10-CM

## 2019-09-25 DIAGNOSIS — L57.0 ACTINIC KERATOSIS: Primary | ICD-10-CM

## 2019-09-25 DIAGNOSIS — Z85.828 HISTORY OF SKIN CANCER: ICD-10-CM

## 2019-09-25 DIAGNOSIS — I10 HYPERTENSION, ESSENTIAL: ICD-10-CM

## 2019-09-25 DIAGNOSIS — Z12.5 SCREENING FOR PROSTATE CANCER: ICD-10-CM

## 2019-09-25 DIAGNOSIS — G47.33 OSA (OBSTRUCTIVE SLEEP APNEA): ICD-10-CM

## 2019-09-25 DIAGNOSIS — R73.01 IMPAIRED FASTING GLUCOSE: ICD-10-CM

## 2019-09-25 DIAGNOSIS — I25.10 CORONARY ARTERY DISEASE INVOLVING NATIVE CORONARY ARTERY OF NATIVE HEART WITHOUT ANGINA PECTORIS: ICD-10-CM

## 2019-09-25 DIAGNOSIS — N40.0 BENIGN PROSTATIC HYPERPLASIA, UNSPECIFIED WHETHER LOWER URINARY TRACT SYMPTOMS PRESENT: ICD-10-CM

## 2019-09-25 DIAGNOSIS — Z13.89 SCREENING FOR SKIN CONDITION: ICD-10-CM

## 2019-09-25 DIAGNOSIS — L82.1 SEBORRHEIC KERATOSIS: ICD-10-CM

## 2019-09-25 DIAGNOSIS — D75.1 POLYCYTHEMIA: ICD-10-CM

## 2019-09-25 DIAGNOSIS — E78.2 COMBINED HYPERLIPIDEMIA: ICD-10-CM

## 2019-09-25 PROCEDURE — 17000 DESTRUCT PREMALG LESION: CPT | Performed by: DERMATOLOGY

## 2019-09-25 PROCEDURE — 36415 COLL VENOUS BLD VENIPUNCTURE: CPT

## 2019-09-25 PROCEDURE — 99213 OFFICE O/P EST LOW 20 MIN: CPT | Performed by: DERMATOLOGY

## 2019-09-25 PROCEDURE — G0008 ADMIN INFLUENZA VIRUS VAC: HCPCS | Performed by: INTERNAL MEDICINE

## 2019-09-25 PROCEDURE — 99215 OFFICE O/P EST HI 40 MIN: CPT | Performed by: INTERNAL MEDICINE

## 2019-09-25 PROCEDURE — 90662 IIV NO PRSV INCREASED AG IM: CPT | Performed by: INTERNAL MEDICINE

## 2019-09-25 PROCEDURE — 88374 M/PHMTRC ALYS ISHQUANT/SEMIQ: CPT

## 2019-09-25 NOTE — PATIENT INSTRUCTIONS
Lab data reviewed in detail and compared prior    Polycythemia-this can be multifactorial with patient's history of obstructive sleep apnea in addition to poor hydration  Will check appropriate labs to rule out polycythemia vera  Patient encouraged to keep better hydrated, especially prior to next lab work  Monitor for symptoms of sleep apnea such is daytime somnolence and waking with a headache  Monitor home blood pressures as poorly controlled hypertension can also be a marker of untreated sleep apnea  Can consider repeat sleep study versus oral mandibular advancement device in the future if necessary  BPH-prostate was enlarged on ultrasound in June  Patient has mild lower urinary tract symptoms and therefore no indication for medication  Will check PSA prior to follow-up as an annual screen  Coronary artery disease is stable without angina, following with Cardiology  We discussed the bruising that can occur with aging skin that can be compounded by anti-platelet agents  This is a benign process  Hypertension and hyperlipidemia remained optimally controlled on present regimen    Impaired fasting glucose remains under excellent control with diet and exercise    Flu shot today    Routine follow-up after labs in 6 months, I will call you with today's labs

## 2019-09-25 NOTE — PROGRESS NOTES
Assessment/Plan:    Diagnoses and all orders for this visit:    Encounter for immunization  -     influenza vaccine, [id number], high-dose, PF 0 5 mL (FLUZONE HIGH-DOSE)    Polycythemia  -     KRZYSZTOF 2; Future  -     BCR/ABL, Fish Manual; Future    ERIC (obstructive sleep apnea)    Impaired fasting glucose  -     Hemoglobin A1C; Future    Coronary artery disease involving native coronary artery of native heart without angina pectoris    Hypertension, essential  -     CBC and differential; Future  -     Comprehensive metabolic panel; Future    Combined hyperlipidemia  -     Lipid panel; Future    Benign prostatic hyperplasia, unspecified whether lower urinary tract symptoms present    Screening for prostate cancer  -     PSA, Total Screen; Future         Patient Instructions   Lab data reviewed in detail and compared prior    Polycythemia-this can be multifactorial with patient's history of obstructive sleep apnea in addition to poor hydration  Will check appropriate labs to rule out polycythemia vera  Patient encouraged to keep better hydrated, especially prior to next lab work  Monitor for symptoms of sleep apnea such is daytime somnolence and waking with a headache  Monitor home blood pressures as poorly controlled hypertension can also be a marker of untreated sleep apnea  Can consider repeat sleep study versus oral mandibular advancement device in the future if necessary  BPH-prostate was enlarged on ultrasound in June  Patient has mild lower urinary tract symptoms and therefore no indication for medication  Will check PSA prior to follow-up as an annual screen  Coronary artery disease is stable without angina, following with Cardiology  We discussed the bruising that can occur with aging skin that can be compounded by anti-platelet agents  This is a benign process      Hypertension and hyperlipidemia remained optimally controlled on present regimen    Impaired fasting glucose remains under excellent control with diet and exercise    Flu shot today    Routine follow-up after labs in 6 months, I will call you with today's labs  Subjective:      Patient ID: Maddie Landaverde is a 80 y o  male    Feeling well   Notes pain in L lateral hip improved w/ hip exercises  Concerned w/ bruising in arms  Notes h/o enlarged prostate seen on u/s  No issues w/ incomplete voids, but 1-2x nocturia and occasional urgency w/o incontinence  Good stream     Exercising 3-4 days per week w/ cardio and wts  + active w/ yardwork  CAD-following w/ cardiology  No angina  HTN/HPL-taking rx as directed, home ([facility]) bp's 115/70's  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, didn't tolerate cpap d/t noise and discomfort  No eds or am headache  Polycythemia-admits not enough water           Current Outpatient Medications:     aspirin 81 MG tablet, Take 1 tablet by mouth daily, Disp: , Rfl:     atorvastatin (LIPITOR) 20 mg tablet, Take 1 tablet (20 mg total) by mouth daily (Patient taking differently: Take 20 mg by mouth daily ), Disp: 90 tablet, Rfl: 3    clopidogrel (PLAVIX) 75 mg tablet, Take 1 tablet (75 mg total) by mouth daily, Disp: 90 tablet, Rfl: 3    hydrocortisone 2 5 % cream, Apply to affected areas in the ear and around the nose twice daily as needed, Disp: 30 g, Rfl: 0    lisinopril (ZESTRIL) 10 mg tablet, TAKE 1 TABLET BY MOUTH EVERY DAY, Disp: 90 tablet, Rfl: 3    metoprolol succinate (TOPROL-XL) 50 mg 24 hr tablet, Take 1 tablet (50 mg total) by mouth daily, Disp: 90 tablet, Rfl: 3    nitroglycerin (NITROSTAT) 0 4 mg SL tablet, Place 1 tablet under the tongue as needed, Disp: , Rfl:     ciprofloxacin-dexamethasone (CIPRODEX) otic suspension, Administer 4 drops into the left ear 2 (two) times a day for 7 days (Patient not taking: Reported on 6/28/2019), Disp: 7 5 mL, Rfl: 0    Recent Results (from the past 1008 hour(s))   CBC and differential    Collection Time: 08/28/19  7:15 AM   Result Value Ref Range    WBC 6 15 4 31 - 10 16 Thousand/uL    RBC 5 45 3 88 - 5 62 Million/uL    Hemoglobin 17 2 (H) 12 0 - 17 0 g/dL    Hematocrit 50 0 (H) 36 5 - 49 3 %    MCV 92 82 - 98 fL    MCH 31 6 26 8 - 34 3 pg    MCHC 34 4 31 4 - 37 4 g/dL    RDW 12 5 11 6 - 15 1 %    MPV 10 4 8 9 - 12 7 fL    Platelets 866 542 - 618 Thousands/uL    nRBC 0 /100 WBCs    Neutrophils Relative 42 (L) 43 - 75 %    Immat GRANS % 0 0 - 2 %    Lymphocytes Relative 35 14 - 44 %    Monocytes Relative 14 (H) 4 - 12 %    Eosinophils Relative 8 (H) 0 - 6 %    Basophils Relative 1 0 - 1 %    Neutrophils Absolute 2 63 1 85 - 7 62 Thousands/µL    Immature Grans Absolute 0 01 0 00 - 0 20 Thousand/uL    Lymphocytes Absolute 2 15 0 60 - 4 47 Thousands/µL    Monocytes Absolute 0 83 0 17 - 1 22 Thousand/µL    Eosinophils Absolute 0 46 0 00 - 0 61 Thousand/µL    Basophils Absolute 0 07 0 00 - 0 10 Thousands/µL   Comprehensive metabolic panel    Collection Time: 08/28/19  7:15 AM   Result Value Ref Range    Sodium 143 136 - 145 mmol/L    Potassium 4 1 3 5 - 5 3 mmol/L    Chloride 110 (H) 100 - 108 mmol/L    CO2 23 21 - 32 mmol/L    ANION GAP 10 4 - 13 mmol/L    BUN 21 5 - 25 mg/dL    Creatinine 0 93 0 60 - 1 30 mg/dL    Glucose, Fasting 87 65 - 99 mg/dL    Calcium 9 4 8 3 - 10 1 mg/dL    AST 25 5 - 45 U/L    ALT 42 12 - 78 U/L    Alkaline Phosphatase 73 46 - 116 U/L    Total Protein 7 3 6 4 - 8 2 g/dL    Albumin 4 3 3 5 - 5 0 g/dL    Total Bilirubin 1 43 (H) 0 20 - 1 00 mg/dL    eGFR 85 ml/min/1 73sq m   Lipid panel    Collection Time: 08/28/19  7:15 AM   Result Value Ref Range    Cholesterol 148 50 - 200 mg/dL    Triglycerides 76 <=150 mg/dL    HDL, Direct 49 40 - 60 mg/dL    LDL Calculated 84 0 - 100 mg/dL    Non-HDL-Chol (CHOL-HDL) 99 mg/dl   Hemoglobin A1C    Collection Time: 08/28/19  7:15 AM   Result Value Ref Range    Hemoglobin A1C 5 1 4 2 - 6 3 %     mg/dl       The following portions of the patient's history were reviewed and updated as appropriate: allergies, current medications, past family history, past medical history, past social history, past surgical history and problem list      Review of Systems   Constitutional: Negative for appetite change, chills, diaphoresis, fatigue, fever and unexpected weight change  HENT: Negative for congestion, hearing loss and rhinorrhea  Eyes: Negative for visual disturbance  Respiratory: Negative for cough, chest tightness, shortness of breath and wheezing  Cardiovascular: Negative for chest pain, palpitations and leg swelling  Gastrointestinal: Negative for abdominal pain and blood in stool  Endocrine: Negative for cold intolerance, heat intolerance, polydipsia and polyuria  Genitourinary: Negative for difficulty urinating, dysuria, frequency and urgency  Musculoskeletal: Negative for arthralgias and myalgias  Skin: Negative for rash  Neurological: Negative for dizziness, weakness, light-headedness and headaches  Hematological: Does not bruise/bleed easily  Psychiatric/Behavioral: Negative for dysphoric mood and sleep disturbance  Objective:      Vitals:    09/25/19 1320   BP: 130/90   Pulse: 64   Resp: 14          Physical Exam   Constitutional: He is oriented to person, place, and time  He appears well-developed and well-nourished  HENT:   Head: Normocephalic and atraumatic  Nose: Nose normal    Mouth/Throat: Oropharynx is clear and moist    Redundant soft tissue in post oral pharynx   Eyes: Pupils are equal, round, and reactive to light  Conjunctivae and EOM are normal  No scleral icterus  Neck: Normal range of motion  Neck supple  No JVD present  No tracheal deviation present  No thyromegaly present  Cardiovascular: Normal rate, regular rhythm and intact distal pulses  Exam reveals no gallop and no friction rub  No murmur heard  Pulmonary/Chest: Effort normal and breath sounds normal  No respiratory distress  He has no wheezes  He has no rales     Musculoskeletal: He exhibits no edema or deformity  Lymphadenopathy:     He has no cervical adenopathy  Neurological: He is alert and oriented to person, place, and time  No cranial nerve deficit  Skin: Skin is warm and dry  No rash noted  No erythema  No pallor  Psychiatric: He has a normal mood and affect   His behavior is normal  Judgment and thought content normal

## 2019-09-25 NOTE — PATIENT INSTRUCTIONS
Actinic Keratosis:  Patient advised lesions are precancers  These should resolve with cryosurgery if not to let us know sun block recommended  Seborrheic keratosis patient reassured these are normal growths we acquire with age no treatment needed  History of skin cancer in no recurrence nothing else atypical sunblock recommended follow-up in 6 months  Screening for dermatologic disorders nothing else of concern noted on complete exam follow-up in 6 months  Treatment with Cryotherapy    The doctor has treated your skin with nitrogen, which is 320 degrees Fahrenheit below zero  He has given the treated area "frostbite "    Stinging should subside within a few hours  You can take Tylenol for pain, if needed  Over the next few days, it is normal if the area becomes reddened, a blood blister, or swollen with fluid  If the lesion treated was near the eye - you could get a swollen eye over the next few days  Do not panic! This is all temporary, and will resolve with time  There is no special treatment - just keep the area clean  Makeup and BandAids can be used, if preferred  When the area starts to dry up and peel off, using Vaseline can help healing  It usually takes up to a month for it to heal   Some lesions are recurrent and may require repeat treatments  If a lesion has not healed in one month, please don't hesitate to contact us  If you have any further questions that are not answered here, please call us  [phone]    Thank you for allowing us to care for you

## 2019-09-25 NOTE — PROGRESS NOTES
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, congenital     Nephrotic syndrome     Nonmelanoma skin cancer     last assessed 18    Old myocardial infarction     Skin cancer     last assessed 14    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      Past Surgical History:   Procedure Laterality Date    ATHERECTOMY      1 with stent placement  last assessed 14    CARDIAC CATHETERIZATION      outcome: successful  last assessed 14    CORONARY ANGIOPLASTY      CORONARY ANGIOPLASTY WITH STENT PLACEMENT      to LAD, diagonal and RCA    MALIGNANT SKIN LESION EXCISION  2012    Trunk,  SCC chest      Social History   Social History     Substance and Sexual Activity   Alcohol Use Yes    Alcohol/week: 2 0 standard drinks    Types: 2 Shots of liquor per week    Comment: Wine consumption (__glasses/day)      Social History     Substance and Sexual Activity   Drug Use No     Social History     Tobacco Use   Smoking Status Never Smoker   Smokeless Tobacco Never Used     Family History   Problem Relation Age of Onset    Heart attack Father 50    Cancer Father     Coronary artery disease Family      Meds/Allergies   No Known Allergies    Meds:  Prior to Admission medications    Medication Sig Start Date End Date Taking?  Authorizing Provider   aspirin 81 MG tablet Take 1 tablet by mouth daily   Yes Historical Provider, MD atorvastatin (LIPITOR) 20 mg tablet Take 1 tablet (20 mg total) by mouth daily 8/28/19  Yes Roderick Fraga DO   clopidogrel (PLAVIX) 75 mg tablet Take 1 tablet (75 mg total) by mouth daily 5/28/19  Yes Negar Harrison MD   hydrocortisone 2 5 % cream Apply to affected areas in the ear and around the nose twice daily as needed 3/6/19  Yes Amanda Carr MD   lisinopril (ZESTRIL) 10 mg tablet TAKE 1 TABLET BY MOUTH EVERY DAY 9/23/19  Yes Amanda Carr MD   metoprolol succinate (TOPROL-XL) 50 mg 24 hr tablet Take 1 tablet (50 mg total) by mouth daily 5/28/19  Yes Negar Harrison MD   nitroglycerin (NITROSTAT) 0 4 mg SL tablet Place 1 tablet under the tongue as needed 5/15/14  Yes Historical Provider, MD   ciprofloxacin-dexamethasone ([facility]) otic suspension Administer 4 drops into the left ear 2 (two) times a day for 7 days  Patient not taking: Reported on 6/28/2019 8/1/18 3/8/19  Carla Dueñas PA-C       Subjective:     Review of Systems:    General: negative for - chills, fatigue, fever,  weight gain or weight loss  Psychological: negative for - anxiety, behavioral disorder, concentration difficulties, decreased libido, depression, irritability, memory difficulties, mood swings, sleep disturbances or suicidal ideation  ENT: negative for - hearing difficulties , nasal congestion, nasal discharge, oral lesions, sinus pain, sneezing, sore throat  Allergy and Immunology: negative for - hives, insect bite sensitivity,  Hematological and Lymphatic: negative for - bleeding problems, blood clots,bruising, swollen lymph nodes  Endocrine: negative for - hair pattern changes, hot flashes, malaise/lethargy, mood swings, palpitations, polydipsia/polyuria, skin changes, temperature intolerance or unexpected weight change  Respiratory: negative for - cough, hemoptysis, orthopnea, shortness of breath, or wheezing  Cardiovascular: negative for - chest pain, dyspnea on exertion, edema,  Gastrointestinal: negative for - abdominal pain, nausea/vomiting  Genito-Urinary: negative for - dysuria, incontinence, irregular/heavy menses or urinary frequency/urgency  Musculoskeletal: negative for - gait disturbance, joint pain, joint stiffness, joint swelling, muscle pain, muscular weakness  Dermatological:  As in HPI  Neurological: negative for confusion, dizziness, headaches, impaired coordination/balance, memory loss, numbness/tingling, seizures, speech problems, tremors or weakness       Objective: There were no vitals taken for this visit  Physical Exam:    General Appearance:    Alert, cooperative, no distress   Head:    Normocephalic, without obvious abnormality, atraumatic           Skin:   A full skin exam was performed including scalp, head scalp, eyes, ears, nose, lips, neck, chest, axilla, abdomen, back, buttocks, bilateral upper extremities, bilateral lower extremities, hands, feet, fingers, toes, fingernails, and toenails scaling erythematous area noted on left temple normal keratotic papules with greasy stuck on appearance previous sites of skin cancer well healed without recurrence nothing else remarkable except for excoriated keratotic areas noted on the right upper arm and right thigh  Physical Exam   HENT:   Head:          Cryotherapy Procedure Note    Pre-operative Diagnosis: actinic keratosis    Plan:  1  Instructed to keep the area dry and clean thereafter  Apply petrolatum if area gets crusty  2  Recommended that the patient use acetaminophen  as needed for pain  Locations:  Left temple    Indications: Destruction of actinic keratosis x1     Patient informed of risks (permanent scarring, infection, light or dark discoloration, bleeding, infection, weakness, numbness and recurrence of the lesion) and benefits of the procedure and verbal informed consent obtained  The areas are treated with liquid nitrogen therapy, frozen until ice ball extended 2 mm beyond lesion, allowed to thaw, and treated again   The patient tolerated procedure well  The patient was instructed on post-op care, warned that there may be blister formation, redness and pain  Recommend OTC analgesia as needed for pain  Condition:  Stable    Complications:  none  Assessment:     1  Actinic keratosis     2  Seborrheic keratosis     3  Screening for skin condition     4  History of skin cancer           Plan:   Actinic Keratosis:  Patient advised lesions are precancers  These should resolve with cryosurgery if not to let us know sun block recommended  Seborrheic keratosis patient reassured these are normal growths we acquire with age no treatment needed  History of skin cancer in no recurrence nothing else atypical sunblock recommended follow-up in 6 months  Screening for dermatologic disorders nothing else of concern noted on complete exam follow-up in 6 months    Rosa M Can MD  9/25/2019,12:28 PM    Portions of the record may have been created with voice recognition software   Occasional wrong word or "sound a like" substitutions may have occurred due to the inherent limitations of voice recognition software   Read the chart carefully and recognize, using context, where substitutions have occurred

## 2019-10-01 LAB — SCAN RESULT: NORMAL

## 2019-11-08 ENCOUNTER — OFFICE VISIT (OUTPATIENT)
Dept: CARDIOLOGY CLINIC | Facility: CLINIC | Age: 69
End: 2019-11-08
Payer: MEDICARE

## 2019-11-08 VITALS
OXYGEN SATURATION: 98 % | SYSTOLIC BLOOD PRESSURE: 122 MMHG | HEART RATE: 66 BPM | DIASTOLIC BLOOD PRESSURE: 72 MMHG | WEIGHT: 228 LBS | BODY MASS INDEX: 32.64 KG/M2 | HEIGHT: 70 IN

## 2019-11-08 DIAGNOSIS — E78.2 COMBINED HYPERLIPIDEMIA: ICD-10-CM

## 2019-11-08 DIAGNOSIS — I25.10 CORONARY ARTERY DISEASE, ANGINA PRESENCE UNSPECIFIED, UNSPECIFIED VESSEL OR LESION TYPE, UNSPECIFIED WHETHER NATIVE OR TRANSPLANTED HEART: Primary | ICD-10-CM

## 2019-11-08 DIAGNOSIS — I10 HYPERTENSION, ESSENTIAL: ICD-10-CM

## 2019-11-08 PROCEDURE — 99213 OFFICE O/P EST LOW 20 MIN: CPT | Performed by: INTERNAL MEDICINE

## 2019-11-08 NOTE — PROGRESS NOTES
[address] [facility] [place] [address]  Cardiology Follow Up    Malu Simon  1950  [id number]      1  Coronary artery disease, angina presence unspecified, unspecified vessel or lesion type, unspecified whether native or transplanted heart     2  Hypertension, essential     3  Combined hyperlipidemia         Chief Complaint   Patient presents with    Follow-up    Coronary Artery Disease       Interval History:  Patient presents for follow-up visit  Patient denies any history of chest pain shortness of breath  Patient denies any history of leg edema or orthopnea PND  No history of presyncope syncope  Patient states compliance with the present list of medications      Patient Active Problem List   Diagnosis    Coronary artery disease involving native coronary artery of native heart without angina pectoris    Hypertension, essential    Combined hyperlipidemia    Impaired fasting glucose    Seborrheic keratosis    Screening for skin condition    History of skin cancer    ERIC (obstructive sleep apnea)    Polycythemia     Past Medical History:   Diagnosis Date    Abnormal LFTs     Benign neoplasm of skin     Lumbar canal stenosis     with neurogenic claudication     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, congenital     Nephrotic syndrome     Nonmelanoma skin cancer     last assessed 1/11/18    Old myocardial infarction     Skin cancer     last assessed 1/13/14    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      Social History     Socioeconomic History    Marital status: /Civil Union     Spouse name: Not on file    Number of children: Not on file    Years of education: Not on file    Highest education level: Not on file   Occupational History    Occupation: retired [employer]     Social Needs    Financial resource strain: Not on file    Food insecurity:     Worry: Not on file     Inability: Not on file    Transportation needs:     Medical: Not on file Non-medical: Not on file   Tobacco Use    Smoking status: Never Smoker    Smokeless tobacco: Never Used   Substance and Sexual Activity    Alcohol use:  Yes     Alcohol/week: 2 0 standard drinks     Types: 2 Shots of liquor per week     Frequency: 4 or more times a week     Drinks per session: 1 or 2     Comment: Wine consumption (__glasses/day)     Drug use: No    Sexual activity: Not Currently   Lifestyle    Physical activity:     Days per week: 4 days     Minutes per session: 60 min    Stress: Not at all   Relationships    Social connections:     Talks on phone: Not on file     Gets together: Not on file     Attends [employer] service: Not on file     Active member of club or organization: Not on file     Attends meetings of clubs or organizations: Not on file     Relationship status: Not on file    Intimate partner violence:     Fear of current or ex partner: Not on file     Emotionally abused: Not on file     Physically abused: Not on file     Forced sexual activity: Not on file   Other Topics Concern    Not on file   Social History Narrative    DME: CPAP    Living independently with spouse     No advance directives      Family History   Problem Relation Age of Onset    Heart attack Father 50    Cancer Father     Coronary artery disease Family      Past Surgical History:   Procedure Laterality Date    ATHERECTOMY      1 with stent placement  last assessed 6/17/14    CARDIAC CATHETERIZATION      outcome: successful  last assessed 6/17/14    CORONARY ANGIOPLASTY      CORONARY ANGIOPLASTY WITH STENT PLACEMENT      to LAD, diagonal and RCA    MALIGNANT SKIN LESION EXCISION  01/09/2012    Trunk,  SCC chest        Current Outpatient Medications:     aspirin 81 MG tablet, Take 1 tablet by mouth daily, Disp: , Rfl:     atorvastatin (LIPITOR) 20 mg tablet, Take 1 tablet (20 mg total) by mouth daily (Patient taking differently: Take 20 mg by mouth daily ), Disp: 90 tablet, Rfl: 3   ciprofloxacin-dexamethasone (CIPRODEX) otic suspension, Administer 4 drops into the left ear 2 (two) times a day for 7 days, Disp: 7 5 mL, Rfl: 0    clopidogrel (PLAVIX) 75 mg tablet, Take 1 tablet (75 mg total) by mouth daily, Disp: 90 tablet, Rfl: 3    hydrocortisone 2 5 % cream, Apply to affected areas in the ear and around the nose twice daily as needed, Disp: 30 g, Rfl: 0    lisinopril (ZESTRIL) 10 mg tablet, TAKE 1 TABLET BY MOUTH EVERY DAY, Disp: 90 tablet, Rfl: 3    metoprolol succinate (TOPROL-XL) 50 mg 24 hr tablet, Take 1 tablet (50 mg total) by mouth daily, Disp: 90 tablet, Rfl: 3    nitroglycerin (NITROSTAT) 0 4 mg SL tablet, Place 1 tablet under the tongue as needed, Disp: , Rfl:   No Known Allergies    Labs:  Appointment on 09/25/2019   Component Date Value    Scan Result 09/25/2019 SEE WRITTEN REPORT      Imaging: No results found  Review of Systems:  Review of Systems  REVIEW OF SYSTEMS:  Constitutional:  Denies fever or chills   Eyes:  Denies change in visual acuity   HENT:  Denies nasal congestion or sore throat   Respiratory:  Denies cough or shortness of breath   Cardiovascular:  Denies chest pain or edema   GI:  Denies abdominal pain, nausea, vomiting, bloody stools or diarrhea   :  Denies dysuria, frequency, difficulty in micturition and nocturia  Musculoskeletal:  Denies back pain or joint pain   Neurologic:  Denies headache, focal weakness or sensory changes   Endocrine:  Denies polyuria or polydipsia   Lymphatic:  Denies swollen glands   Psychiatric:  Denies depression or anxiety   Physical Exam:    /72   Pulse 66   Ht 5' 10" (1 778 m)   Wt 103 kg (228 lb)   SpO2 98%   BMI 32 71 kg/m²     Physical Exam   PHYSICAL EXAM:  General:  Patient is not in acute distress   Head: Normocephalic, Atraumatic  HEENT:  Both pupils normal-size atraumatic, normocephalic, nonicteric  Neck:  JVP not raised   Trachea central  No carotid bruit  Respiratory:  normal breath sounds no crackles  no rhonchi  Cardiovascular:  Regular rate and rhythm no S3 no murmurs  GI:  Abdomen soft nontender  No organomegaly  Lymphatic:  No cervical or inguinal lymphadenopathy  Neurologic:  Patient is awake alert, oriented   Grossly nonfocal  Extremities no edema    Discussion/Summary:  Patient overall doing well from a cardiovascular standpoint  Symptoms to watch out from cardiac standpoint which would indicate the need for further cardiac evaluation discussed  Medications reviewed  Previous cardiovascular studies reviewed  Follow-up in 6 months or earlier as needed  Follow-up with primary care physician

## 2020-03-27 ENCOUNTER — APPOINTMENT (OUTPATIENT)
Dept: LAB | Facility: CLINIC | Age: 70
End: 2020-03-27
Payer: MEDICARE

## 2020-03-27 DIAGNOSIS — R73.01 IMPAIRED FASTING GLUCOSE: ICD-10-CM

## 2020-03-27 DIAGNOSIS — I10 HYPERTENSION, ESSENTIAL: ICD-10-CM

## 2020-03-27 DIAGNOSIS — D75.1 POLYCYTHEMIA: ICD-10-CM

## 2020-03-27 DIAGNOSIS — E78.2 COMBINED HYPERLIPIDEMIA: ICD-10-CM

## 2020-03-27 DIAGNOSIS — Z12.5 SCREENING FOR PROSTATE CANCER: ICD-10-CM

## 2020-03-27 LAB
ALBUMIN SERPL BCP-MCNC: 3.9 G/DL (ref 3.5–5)
ALP SERPL-CCNC: 71 U/L (ref 46–116)
ALT SERPL W P-5'-P-CCNC: 50 U/L (ref 12–78)
ANION GAP SERPL CALCULATED.3IONS-SCNC: 5 MMOL/L (ref 4–13)
AST SERPL W P-5'-P-CCNC: 30 U/L (ref 5–45)
BASOPHILS # BLD AUTO: 0.07 THOUSANDS/ΜL (ref 0–0.1)
BASOPHILS NFR BLD AUTO: 1 % (ref 0–1)
BILIRUB SERPL-MCNC: 1.32 MG/DL (ref 0.2–1)
BUN SERPL-MCNC: 18 MG/DL (ref 5–25)
CALCIUM SERPL-MCNC: 9.1 MG/DL (ref 8.3–10.1)
CHLORIDE SERPL-SCNC: 109 MMOL/L (ref 100–108)
CHOLEST SERPL-MCNC: 161 MG/DL (ref 50–200)
CO2 SERPL-SCNC: 25 MMOL/L (ref 21–32)
CREAT SERPL-MCNC: 0.86 MG/DL (ref 0.6–1.3)
EOSINOPHIL # BLD AUTO: 0.5 THOUSAND/ΜL (ref 0–0.61)
EOSINOPHIL NFR BLD AUTO: 9 % (ref 0–6)
ERYTHROCYTE [DISTWIDTH] IN BLOOD BY AUTOMATED COUNT: 12.8 % (ref 11.6–15.1)
EST. AVERAGE GLUCOSE BLD GHB EST-MCNC: 100 MG/DL
GFR SERPL CREATININE-BSD FRML MDRD: 89 ML/MIN/1.73SQ M
GLUCOSE P FAST SERPL-MCNC: 97 MG/DL (ref 65–99)
HBA1C MFR BLD: 5.1 %
HCT VFR BLD AUTO: 49.1 % (ref 36.5–49.3)
HDLC SERPL-MCNC: 46 MG/DL
HGB BLD-MCNC: 17.2 G/DL (ref 12–17)
IMM GRANULOCYTES # BLD AUTO: 0.02 THOUSAND/UL (ref 0–0.2)
IMM GRANULOCYTES NFR BLD AUTO: 0 % (ref 0–2)
LDLC SERPL CALC-MCNC: 96 MG/DL (ref 0–100)
LYMPHOCYTES # BLD AUTO: 1.94 THOUSANDS/ΜL (ref 0.6–4.47)
LYMPHOCYTES NFR BLD AUTO: 33 % (ref 14–44)
MCH RBC QN AUTO: 32 PG (ref 26.8–34.3)
MCHC RBC AUTO-ENTMCNC: 35 G/DL (ref 31.4–37.4)
MCV RBC AUTO: 91 FL (ref 82–98)
MONOCYTES # BLD AUTO: 0.83 THOUSAND/ΜL (ref 0.17–1.22)
MONOCYTES NFR BLD AUTO: 14 % (ref 4–12)
NEUTROPHILS # BLD AUTO: 2.49 THOUSANDS/ΜL (ref 1.85–7.62)
NEUTS SEG NFR BLD AUTO: 43 % (ref 43–75)
NONHDLC SERPL-MCNC: 115 MG/DL
NRBC BLD AUTO-RTO: 0 /100 WBCS
PLATELET # BLD AUTO: 167 THOUSANDS/UL (ref 149–390)
PMV BLD AUTO: 10 FL (ref 8.9–12.7)
POTASSIUM SERPL-SCNC: 4.3 MMOL/L (ref 3.5–5.3)
PROT SERPL-MCNC: 7.1 G/DL (ref 6.4–8.2)
PSA SERPL-MCNC: 0.5 NG/ML (ref 0–4)
RBC # BLD AUTO: 5.38 MILLION/UL (ref 3.88–5.62)
SODIUM SERPL-SCNC: 139 MMOL/L (ref 136–145)
TRIGL SERPL-MCNC: 96 MG/DL
WBC # BLD AUTO: 5.85 THOUSAND/UL (ref 4.31–10.16)

## 2020-03-27 PROCEDURE — 36415 COLL VENOUS BLD VENIPUNCTURE: CPT

## 2020-03-27 PROCEDURE — 85025 COMPLETE CBC W/AUTO DIFF WBC: CPT

## 2020-03-27 PROCEDURE — G0103 PSA SCREENING: HCPCS

## 2020-03-27 PROCEDURE — 80061 LIPID PANEL: CPT

## 2020-03-27 PROCEDURE — 83036 HEMOGLOBIN GLYCOSYLATED A1C: CPT

## 2020-03-27 PROCEDURE — 80053 COMPREHEN METABOLIC PANEL: CPT

## 2020-03-31 ENCOUNTER — TELEPHONE (OUTPATIENT)
Dept: INTERNAL MEDICINE CLINIC | Facility: CLINIC | Age: 70
End: 2020-03-31

## 2020-03-31 LAB — SCAN RESULT: NORMAL

## 2020-04-01 ENCOUNTER — TELEPHONE (OUTPATIENT)
Dept: INTERNAL MEDICINE CLINIC | Facility: CLINIC | Age: 70
End: 2020-04-01

## 2020-04-01 ENCOUNTER — TELEMEDICINE (OUTPATIENT)
Dept: INTERNAL MEDICINE CLINIC | Facility: CLINIC | Age: 70
End: 2020-04-01
Payer: MEDICARE

## 2020-04-01 VITALS
BODY MASS INDEX: 32.28 KG/M2 | SYSTOLIC BLOOD PRESSURE: 120 MMHG | HEART RATE: 73 BPM | WEIGHT: 225 LBS | DIASTOLIC BLOOD PRESSURE: 75 MMHG | TEMPERATURE: 99.5 F

## 2020-04-01 DIAGNOSIS — R73.01 IMPAIRED FASTING GLUCOSE: ICD-10-CM

## 2020-04-01 DIAGNOSIS — I10 HYPERTENSION, ESSENTIAL: ICD-10-CM

## 2020-04-01 DIAGNOSIS — I25.10 CORONARY ARTERY DISEASE INVOLVING NATIVE CORONARY ARTERY OF NATIVE HEART WITHOUT ANGINA PECTORIS: Primary | ICD-10-CM

## 2020-04-01 DIAGNOSIS — D75.1 POLYCYTHEMIA: ICD-10-CM

## 2020-04-01 DIAGNOSIS — G47.33 OSA (OBSTRUCTIVE SLEEP APNEA): ICD-10-CM

## 2020-04-01 DIAGNOSIS — E78.2 COMBINED HYPERLIPIDEMIA: ICD-10-CM

## 2020-04-01 PROCEDURE — 99443 PR PHYS/QHP TELEPHONE EVALUATION 21-30 MIN: CPT | Performed by: INTERNAL MEDICINE

## 2020-05-20 DIAGNOSIS — I10 BENIGN ESSENTIAL HTN: ICD-10-CM

## 2020-05-20 DIAGNOSIS — I25.10 CORONARY ARTERY DISEASE, ANGINA PRESENCE UNSPECIFIED, UNSPECIFIED VESSEL OR LESION TYPE, UNSPECIFIED WHETHER NATIVE OR TRANSPLANTED HEART: ICD-10-CM

## 2020-05-20 RX ORDER — CLOPIDOGREL BISULFATE 75 MG/1
TABLET ORAL
Qty: 90 TABLET | Refills: 3 | Status: SHIPPED | OUTPATIENT
Start: 2020-05-20 | End: 2021-06-09

## 2020-05-20 RX ORDER — METOPROLOL SUCCINATE 50 MG/1
TABLET, EXTENDED RELEASE ORAL
Qty: 90 TABLET | Refills: 3 | Status: SHIPPED | OUTPATIENT
Start: 2020-05-20 | End: 2021-06-09

## 2020-05-30 DIAGNOSIS — E78.2 COMBINED HYPERLIPIDEMIA: ICD-10-CM

## 2020-05-31 RX ORDER — ATORVASTATIN CALCIUM 20 MG/1
TABLET, FILM COATED ORAL
Qty: 90 TABLET | Refills: 1 | Status: SHIPPED | OUTPATIENT
Start: 2020-05-31 | End: 2020-12-03 | Stop reason: SDUPTHER

## 2020-06-18 ENCOUNTER — OFFICE VISIT (OUTPATIENT)
Dept: CARDIOLOGY CLINIC | Facility: CLINIC | Age: 70
End: 2020-06-18
Payer: MEDICARE

## 2020-06-18 VITALS
OXYGEN SATURATION: 97 % | HEART RATE: 72 BPM | SYSTOLIC BLOOD PRESSURE: 140 MMHG | DIASTOLIC BLOOD PRESSURE: 82 MMHG | HEIGHT: 70 IN | BODY MASS INDEX: 32.35 KG/M2 | WEIGHT: 226 LBS

## 2020-06-18 DIAGNOSIS — R06.02 SHORTNESS OF BREATH: ICD-10-CM

## 2020-06-18 DIAGNOSIS — I10 BENIGN ESSENTIAL HTN: ICD-10-CM

## 2020-06-18 DIAGNOSIS — E78.2 COMBINED HYPERLIPIDEMIA: ICD-10-CM

## 2020-06-18 DIAGNOSIS — I25.10 CORONARY ARTERY DISEASE, ANGINA PRESENCE UNSPECIFIED, UNSPECIFIED VESSEL OR LESION TYPE, UNSPECIFIED WHETHER NATIVE OR TRANSPLANTED HEART: Primary | ICD-10-CM

## 2020-06-18 PROCEDURE — 4040F PNEUMOC VAC/ADMIN/RCVD: CPT | Performed by: INTERNAL MEDICINE

## 2020-06-18 PROCEDURE — 3077F SYST BP >= 140 MM HG: CPT | Performed by: INTERNAL MEDICINE

## 2020-06-18 PROCEDURE — 1160F RVW MEDS BY RX/DR IN RCRD: CPT | Performed by: INTERNAL MEDICINE

## 2020-06-18 PROCEDURE — 1036F TOBACCO NON-USER: CPT | Performed by: INTERNAL MEDICINE

## 2020-06-18 PROCEDURE — 3008F BODY MASS INDEX DOCD: CPT | Performed by: INTERNAL MEDICINE

## 2020-06-18 PROCEDURE — 99214 OFFICE O/P EST MOD 30 MIN: CPT | Performed by: INTERNAL MEDICINE

## 2020-06-18 PROCEDURE — 3079F DIAST BP 80-89 MM HG: CPT | Performed by: INTERNAL MEDICINE

## 2020-07-23 ENCOUNTER — HOSPITAL ENCOUNTER (OUTPATIENT)
Dept: NON INVASIVE DIAGNOSTICS | Facility: CLINIC | Age: 70
Discharge: HOME | End: 2020-07-23
Payer: MEDICARE

## 2020-07-23 DIAGNOSIS — I25.10 CORONARY ARTERY DISEASE, ANGINA PRESENCE UNSPECIFIED, UNSPECIFIED VESSEL OR LESION TYPE, UNSPECIFIED WHETHER NATIVE OR TRANSPLANTED HEART: ICD-10-CM

## 2020-07-23 DIAGNOSIS — R06.02 SHORTNESS OF BREATH: ICD-10-CM

## 2020-07-23 PROCEDURE — 93017 CV STRESS TEST TRACING ONLY: CPT

## 2020-07-23 PROCEDURE — 93306 TTE W/DOPPLER COMPLETE: CPT | Performed by: INTERNAL MEDICINE

## 2020-07-23 PROCEDURE — 78452 HT MUSCLE IMAGE SPECT MULT: CPT

## 2020-07-23 PROCEDURE — 93016 CV STRESS TEST SUPVJ ONLY: CPT | Performed by: INTERNAL MEDICINE

## 2020-07-23 PROCEDURE — 93018 CV STRESS TEST I&R ONLY: CPT | Performed by: INTERNAL MEDICINE

## 2020-07-23 PROCEDURE — 78452 HT MUSCLE IMAGE SPECT MULT: CPT | Performed by: INTERNAL MEDICINE

## 2020-07-23 PROCEDURE — A9502 TC99M TETROFOSMIN: HCPCS

## 2020-07-23 PROCEDURE — 93306 TTE W/DOPPLER COMPLETE: CPT

## 2020-07-30 LAB
MAX DIASTOLIC BP: 84 MMHG
MAX HEART RATE: 148 BPM
MAX PREDICTED HEART RATE: 152 BPM
MAX. SYSTOLIC BP: 222 MMHG
PROTOCOL NAME: NORMAL
TARGET HR FORMULA: NORMAL
TIME IN EXERCISE PHASE: NORMAL

## 2020-10-05 ENCOUNTER — OFFICE VISIT (OUTPATIENT)
Dept: URGENT CARE | Facility: CLINIC | Age: 70
End: 2020-10-05
Payer: MEDICARE

## 2020-10-05 VITALS
OXYGEN SATURATION: 95 % | RESPIRATION RATE: 18 BRPM | DIASTOLIC BLOOD PRESSURE: 82 MMHG | SYSTOLIC BLOOD PRESSURE: 120 MMHG | TEMPERATURE: 97.3 F | HEART RATE: 66 BPM

## 2020-10-05 DIAGNOSIS — H60.392 OTHER INFECTIVE ACUTE OTITIS EXTERNA OF LEFT EAR: Primary | ICD-10-CM

## 2020-10-05 PROCEDURE — 99213 OFFICE O/P EST LOW 20 MIN: CPT | Performed by: PHYSICIAN ASSISTANT

## 2020-10-05 PROCEDURE — G0463 HOSPITAL OUTPT CLINIC VISIT: HCPCS | Performed by: PHYSICIAN ASSISTANT

## 2020-10-05 RX ORDER — NEOMYCIN SULFATE, POLYMYXIN B SULFATE AND HYDROCORTISONE 10; 3.5; 1 MG/ML; MG/ML; [USP'U]/ML
4 SUSPENSION/ DROPS AURICULAR (OTIC) 4 TIMES DAILY
Qty: 10 ML | Refills: 0 | Status: SHIPPED | OUTPATIENT
Start: 2020-10-05 | End: 2021-02-23

## 2020-10-30 DIAGNOSIS — I10 ESSENTIAL HYPERTENSION: ICD-10-CM

## 2020-10-30 RX ORDER — LISINOPRIL 10 MG/1
10 TABLET ORAL DAILY
Qty: 90 TABLET | Refills: 3 | Status: SHIPPED | OUTPATIENT
Start: 2020-10-30 | End: 2021-01-27 | Stop reason: SDUPTHER

## 2020-12-03 ENCOUNTER — OFFICE VISIT (OUTPATIENT)
Dept: CARDIOLOGY CLINIC | Facility: CLINIC | Age: 70
End: 2020-12-03
Payer: MEDICARE

## 2020-12-03 VITALS
OXYGEN SATURATION: 97 % | WEIGHT: 232 LBS | HEIGHT: 70 IN | SYSTOLIC BLOOD PRESSURE: 144 MMHG | HEART RATE: 68 BPM | BODY MASS INDEX: 33.21 KG/M2 | DIASTOLIC BLOOD PRESSURE: 92 MMHG

## 2020-12-03 DIAGNOSIS — I25.10 CORONARY ARTERY DISEASE, ANGINA PRESENCE UNSPECIFIED, UNSPECIFIED VESSEL OR LESION TYPE, UNSPECIFIED WHETHER NATIVE OR TRANSPLANTED HEART: Primary | ICD-10-CM

## 2020-12-03 DIAGNOSIS — I10 HYPERTENSION, ESSENTIAL: ICD-10-CM

## 2020-12-03 DIAGNOSIS — E78.2 COMBINED HYPERLIPIDEMIA: ICD-10-CM

## 2020-12-03 PROBLEM — D75.1 POLYCYTHEMIA: Status: RESOLVED | Noted: 2019-09-25 | Resolved: 2020-12-03

## 2020-12-03 PROCEDURE — 99213 OFFICE O/P EST LOW 20 MIN: CPT | Performed by: INTERNAL MEDICINE

## 2020-12-03 RX ORDER — ATORVASTATIN CALCIUM 20 MG/1
20 TABLET, FILM COATED ORAL DAILY
Qty: 90 TABLET | Refills: 1
Start: 2020-12-03 | End: 2021-03-14

## 2020-12-04 ENCOUNTER — LAB (OUTPATIENT)
Dept: LAB | Facility: CLINIC | Age: 70
End: 2020-12-04
Payer: MEDICARE

## 2020-12-04 DIAGNOSIS — E78.2 COMBINED HYPERLIPIDEMIA: ICD-10-CM

## 2020-12-04 DIAGNOSIS — R73.01 IMPAIRED FASTING GLUCOSE: ICD-10-CM

## 2020-12-04 DIAGNOSIS — I10 HYPERTENSION, ESSENTIAL: ICD-10-CM

## 2020-12-04 LAB
ALBUMIN SERPL BCP-MCNC: 3.6 G/DL (ref 3.5–5)
ALP SERPL-CCNC: 71 U/L (ref 46–116)
ALT SERPL W P-5'-P-CCNC: 47 U/L (ref 12–78)
ANION GAP SERPL CALCULATED.3IONS-SCNC: 6 MMOL/L (ref 4–13)
AST SERPL W P-5'-P-CCNC: 16 U/L (ref 5–45)
BASOPHILS # BLD AUTO: 0.1 THOUSANDS/ΜL (ref 0–0.1)
BASOPHILS NFR BLD AUTO: 2 % (ref 0–1)
BILIRUB SERPL-MCNC: 1.18 MG/DL (ref 0.2–1)
BUN SERPL-MCNC: 16 MG/DL (ref 5–25)
CALCIUM SERPL-MCNC: 8.9 MG/DL (ref 8.3–10.1)
CHLORIDE SERPL-SCNC: 108 MMOL/L (ref 100–108)
CHOLEST SERPL-MCNC: 150 MG/DL (ref 50–200)
CO2 SERPL-SCNC: 25 MMOL/L (ref 21–32)
CREAT SERPL-MCNC: 0.91 MG/DL (ref 0.6–1.3)
EOSINOPHIL # BLD AUTO: 0.44 THOUSAND/ΜL (ref 0–0.61)
EOSINOPHIL NFR BLD AUTO: 7 % (ref 0–6)
ERYTHROCYTE [DISTWIDTH] IN BLOOD BY AUTOMATED COUNT: 12.8 % (ref 11.6–15.1)
EST. AVERAGE GLUCOSE BLD GHB EST-MCNC: 100 MG/DL
GFR SERPL CREATININE-BSD FRML MDRD: 86 ML/MIN/1.73SQ M
GLUCOSE P FAST SERPL-MCNC: 88 MG/DL (ref 65–99)
HBA1C MFR BLD: 5.1 %
HCT VFR BLD AUTO: 47 % (ref 36.5–49.3)
HDLC SERPL-MCNC: 41 MG/DL
HGB BLD-MCNC: 16.6 G/DL (ref 12–17)
IMM GRANULOCYTES # BLD AUTO: 0.02 THOUSAND/UL (ref 0–0.2)
IMM GRANULOCYTES NFR BLD AUTO: 0 % (ref 0–2)
LDLC SERPL CALC-MCNC: 81 MG/DL (ref 0–100)
LYMPHOCYTES # BLD AUTO: 1.87 THOUSANDS/ΜL (ref 0.6–4.47)
LYMPHOCYTES NFR BLD AUTO: 31 % (ref 14–44)
MCH RBC QN AUTO: 31.5 PG (ref 26.8–34.3)
MCHC RBC AUTO-ENTMCNC: 35.3 G/DL (ref 31.4–37.4)
MCV RBC AUTO: 89 FL (ref 82–98)
MONOCYTES # BLD AUTO: 0.84 THOUSAND/ΜL (ref 0.17–1.22)
MONOCYTES NFR BLD AUTO: 14 % (ref 4–12)
NEUTROPHILS # BLD AUTO: 2.68 THOUSANDS/ΜL (ref 1.85–7.62)
NEUTS SEG NFR BLD AUTO: 46 % (ref 43–75)
NONHDLC SERPL-MCNC: 109 MG/DL
NRBC BLD AUTO-RTO: 0 /100 WBCS
PLATELET # BLD AUTO: 160 THOUSANDS/UL (ref 149–390)
PMV BLD AUTO: 9.8 FL (ref 8.9–12.7)
POTASSIUM SERPL-SCNC: 3.9 MMOL/L (ref 3.5–5.3)
PROT SERPL-MCNC: 6.7 G/DL (ref 6.4–8.2)
RBC # BLD AUTO: 5.27 MILLION/UL (ref 3.88–5.62)
SODIUM SERPL-SCNC: 139 MMOL/L (ref 136–145)
TRIGL SERPL-MCNC: 140 MG/DL
URATE SERPL-MCNC: 5.9 MG/DL (ref 4.2–8)
WBC # BLD AUTO: 5.95 THOUSAND/UL (ref 4.31–10.16)

## 2020-12-04 PROCEDURE — 80061 LIPID PANEL: CPT

## 2020-12-04 PROCEDURE — 84550 ASSAY OF BLOOD/URIC ACID: CPT

## 2020-12-04 PROCEDURE — 85025 COMPLETE CBC W/AUTO DIFF WBC: CPT

## 2020-12-04 PROCEDURE — 80053 COMPREHEN METABOLIC PANEL: CPT

## 2020-12-04 PROCEDURE — 36415 COLL VENOUS BLD VENIPUNCTURE: CPT

## 2020-12-04 PROCEDURE — 83036 HEMOGLOBIN GLYCOSYLATED A1C: CPT

## 2021-01-27 ENCOUNTER — OFFICE VISIT (OUTPATIENT)
Dept: INTERNAL MEDICINE CLINIC | Facility: CLINIC | Age: 71
End: 2021-01-27
Payer: MEDICARE

## 2021-01-27 VITALS
TEMPERATURE: 97.8 F | SYSTOLIC BLOOD PRESSURE: 154 MMHG | RESPIRATION RATE: 16 BRPM | HEART RATE: 66 BPM | BODY MASS INDEX: 33.79 KG/M2 | WEIGHT: 236 LBS | HEIGHT: 70 IN | DIASTOLIC BLOOD PRESSURE: 94 MMHG

## 2021-01-27 DIAGNOSIS — I25.10 CORONARY ARTERY DISEASE INVOLVING NATIVE CORONARY ARTERY OF NATIVE HEART WITHOUT ANGINA PECTORIS: ICD-10-CM

## 2021-01-27 DIAGNOSIS — I10 HYPERTENSION, ESSENTIAL: ICD-10-CM

## 2021-01-27 DIAGNOSIS — E78.2 COMBINED HYPERLIPIDEMIA: ICD-10-CM

## 2021-01-27 DIAGNOSIS — R73.01 IMPAIRED FASTING GLUCOSE: Primary | ICD-10-CM

## 2021-01-27 DIAGNOSIS — I10 ESSENTIAL HYPERTENSION: ICD-10-CM

## 2021-01-27 DIAGNOSIS — G47.33 OSA (OBSTRUCTIVE SLEEP APNEA): ICD-10-CM

## 2021-01-27 PROCEDURE — 1123F ACP DISCUSS/DSCN MKR DOCD: CPT | Performed by: INTERNAL MEDICINE

## 2021-01-27 PROCEDURE — G0438 PPPS, INITIAL VISIT: HCPCS | Performed by: INTERNAL MEDICINE

## 2021-01-27 PROCEDURE — 99214 OFFICE O/P EST MOD 30 MIN: CPT | Performed by: INTERNAL MEDICINE

## 2021-01-27 RX ORDER — LISINOPRIL 20 MG/1
20 TABLET ORAL DAILY
Qty: 90 TABLET | Refills: 3 | Status: SHIPPED | OUTPATIENT
Start: 2021-01-27 | End: 2022-01-27

## 2021-01-27 NOTE — PROGRESS NOTES
Assessment/Plan:    Diagnoses and all orders for this visit:    Impaired fasting glucose  -     Hemoglobin A1C; Future    ERIC (obstructive sleep apnea)    Coronary artery disease involving native coronary artery of native heart without angina pectoris  -     Lipid panel; Future    Hypertension, essential  -     TSH, 3rd generation with Free T4 reflex; Future  -     Uric acid; Future    Combined hyperlipidemia    Essential hypertension  -     CBC and differential; Future  -     Comprehensive metabolic panel; Future  -     lisinopril (ZESTRIL) 20 mg tablet; Take 1 tablet (20 mg total) by mouth daily              Patient Instructions   Data reviewed in detail and compared prior    Coronary artery disease stable without angina-continue with aggressive risk factor modification as below    Hyperlipidemia-at goal on 10 mg atorvastatin    Hypertension-suboptimally controlled today with no recent outpatient blood pressures  Increase lisinopril to 20 mg daily continue metoprolol XL 50 mg daily  Monitor home blood pressures  Consider [employer] or life source blood pressure monitor  Take your blood pressure after resting for 5 minutes with your feet flat on the floor, blood pressure monitor at heart level, measure 3 blood pressure readings 1 minute apart an average them together  Take blood pressure any time of day, but record time of day  Return in 6 weeks with blood pressure monitor for [id number] comparison  Health maintenance-register for COVID vaccine through my chart  Once vaccinated consider Shingrix available at local pharmacy  Health maintenance is otherwise up-to-date      Subjective:      Patient ID: David Quintero is a 72 y o  male    F/u mmp, awv, and review labs  Feeling well   BPH-Notes h/o enlarged prostate seen on u/s  No issues w/ incomplete voids, but 1-2x nocturia, but more issues w/ urgency w/o incontinence  Good stream     Exercising less d/t gym being closed, walking 4d per week  CAD-following w/ cardiology  No angina  HTN/HPL-taking rx as directed, no home bp's  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, didn't tolerate cpap d/t noise and discomfort  No eds or am headache  Polycythemia-admits not enough water  Current Outpatient Medications:     aspirin 81 MG tablet, Take 1 tablet by mouth daily, Disp: , Rfl:     atorvastatin (LIPITOR) 20 mg tablet, Take 1 tablet (20 mg total) by mouth daily, Disp: 90 tablet, Rfl: 1    clopidogrel (PLAVIX) 75 mg tablet, TAKE 1 TABLET BY MOUTH EVERY DAY, Disp: 90 tablet, Rfl: 3    lisinopril (ZESTRIL) 20 mg tablet, Take 1 tablet (20 mg total) by mouth daily, Disp: 90 tablet, Rfl: 3    metoprolol succinate (TOPROL-XL) 50 mg 24 hr tablet, TAKE 1 TABLET BY MOUTH EVERY DAY, Disp: 90 tablet, Rfl: 3    nitroglycerin (NITROSTAT) 0 4 mg SL tablet, Place 1 tablet under the tongue as needed, Disp: , Rfl:     hydrocortisone 2 5 % cream, Apply to affected areas in the ear and around the nose twice daily as needed, Disp: 30 g, Rfl: 0    neomycin-polymyxin-hydrocortisone (CORTISPORIN) 0 35%-10,000 units/mL-1% otic suspension, Administer 4 drops into the left ear 4 (four) times a day, Disp: 10 mL, Rfl: 0    No results found for this or any previous visit (from the past 1008 hour(s))      The following portions of the patient's history were reviewed and updated as appropriate: allergies, current medications, past family history, past medical history, past social history, past surgical history and problem list      Review of Systems      Objective:      Vitals:    01/27/21 1557   BP: 154/94   Pulse:    Resp:    Temp:           Physical Exam

## 2021-01-27 NOTE — PROGRESS NOTES
Assessment and Plan:     Problem List Items Addressed This Visit     None        BMI Counseling: Body mass index is 33 86 kg/m²  The BMI is above normal  Nutrition recommendations include decreasing portion sizes, decreasing fast food intake, consuming healthier snacks, limiting drinks that contain sugar, moderation in carbohydrate intake and reducing intake of saturated and trans fat  Exercise recommendations include moderate physical activity 150 minutes/week  No pharmacotherapy was ordered  Preventive health issues were discussed with patient, and age appropriate screening tests were ordered as noted in patient's After Visit Summary  Personalized health advice and appropriate referrals for health education or preventive services given if needed, as noted in patient's After Visit Summary       History of Present Illness:     Patient presents for Medicare Annual Wellness visit    Patient Care Team:  Maya Way MD as PCP - MD Huang Aguiar MD     Problem List:     Patient Active Problem List   Diagnosis    Coronary artery disease involving native coronary artery of native heart without angina pectoris    Hypertension, essential    Combined hyperlipidemia    Impaired fasting glucose    Seborrheic keratosis    Screening for skin condition    History of skin cancer    ERIC (obstructive sleep apnea)      Past Medical and Surgical History:     Past Medical History:   Diagnosis Date    Abnormal LFTs     Benign neoplasm of skin     Lumbar canal stenosis     with neurogenic claudication     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, congenital     Nephrotic syndrome     Nonmelanoma skin cancer     last assessed 1/11/18    Old myocardial infarction     Skin cancer     last assessed 1/13/14    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      Past Surgical History:   Procedure Laterality Date    ATHERECTOMY      1 with stent placement  last assessed 6/17/14    CARDIAC CATHETERIZATION outcome: successful  last assessed 6/17/14    CORONARY ANGIOPLASTY      CORONARY ANGIOPLASTY WITH STENT PLACEMENT      to LAD, diagonal and RCA    MALIGNANT SKIN LESION EXCISION  01/09/2012    Trunk,  SCC chest       Family History:     Family History   Problem Relation Age of Onset    Heart attack Father 50    Cancer Father     Coronary artery disease Family       Social History:     E-Cigarette/Vaping    E-Cigarette Use Never User      E-Cigarette/Vaping Substances    Nicotine No     THC No     CBD No     Flavoring No     Other No     Unknown No      Social History     Socioeconomic History    Marital status: /Civil Union     Spouse name: None    Number of children: None    Years of education: None    Highest education level: None   Occupational History    Occupation: retired [employer] teacher    Social Needs    Financial resource strain: None    Food insecurity     Worry: None     Inability: None    Transportation needs     Medical: None     Non-medical: None   Tobacco Use    Smoking status: Never Smoker    Smokeless tobacco: Never Used   Substance and Sexual Activity    Alcohol use:  Yes     Alcohol/week: 2 0 standard drinks     Types: 2 Shots of liquor per week     Frequency: 4 or more times a week     Drinks per session: 1 or 2     Comment: Wine consumption (__glasses/day)     Drug use: No    Sexual activity: Not Currently   Lifestyle    Physical activity     Days per week: 4 days     Minutes per session: 60 min    Stress: Not at all   Relationships    Social connections     Talks on phone: None     Gets together: None     Attends Protestant service: None     Active member of club or organization: None     Attends meetings of clubs or organizations: None     Relationship status: None    Intimate partner violence     Fear of current or ex partner: None     Emotionally abused: None     Physically abused: None     Forced sexual activity: None   Other Topics Concern    None Social History Narrative    DME: CPAP    Living independently with spouse     No advance directives      Medications and Allergies:     Current Outpatient Medications   Medication Sig Dispense Refill    aspirin 81 MG tablet Take 1 tablet by mouth daily      atorvastatin (LIPITOR) 20 mg tablet Take 1 tablet (20 mg total) by mouth daily 90 tablet 1    clopidogrel (PLAVIX) 75 mg tablet TAKE 1 TABLET BY MOUTH EVERY DAY 90 tablet 3    lisinopril (ZESTRIL) 10 mg tablet Take 1 tablet (10 mg total) by mouth daily 90 tablet 3    metoprolol succinate (TOPROL-XL) 50 mg 24 hr tablet TAKE 1 TABLET BY MOUTH EVERY DAY 90 tablet 3    nitroglycerin (NITROSTAT) 0 4 mg SL tablet Place 1 tablet under the tongue as needed      hydrocortisone 2 5 % cream Apply to affected areas in the ear and around the nose twice daily as needed 30 g 0    neomycin-polymyxin-hydrocortisone (CORTISPORIN) 0 35%-10,000 units/mL-1% otic suspension Administer 4 drops into the left ear 4 (four) times a day 10 mL 0     No current facility-administered medications for this visit        No Known Allergies   Immunizations:     Immunization History   Administered Date(s) Administered    INFLUENZA 11/11/2013, 11/19/2014, 11/09/2015, 08/10/2017, 11/07/2018    Influenza Quadrivalent, 6-35 Months IM 11/19/2014, 11/09/2015, 08/10/2017    Influenza Split High Dose Preservative Free IM 11/07/2018    Influenza, high dose seasonal 0 7 mL 09/25/2019    Pneumococcal Conjugate 13-Valent 01/24/2017    Pneumococcal Polysaccharide PPV23 1950, 07/24/2018    Tdap 1950      Health Maintenance:         Topic Date Due    Colorectal Cancer Screening  02/05/2023    Hepatitis C Screening  Completed         Topic Date Due    DTaP,Tdap,and Td Vaccines (1 - Tdap) 10/23/1972    Influenza Vaccine (1) 09/01/2020      Medicare Health Risk Assessment:     /100 (BP Location: Left arm, Patient Position: Sitting, Cuff Size: Large)   Pulse 66   Temp 97 8 °F (36 6 °C) (Temporal)   Resp 16   Ht 5' 10" (1 778 m)   Wt 107 kg (236 lb)   BMI 33 86 kg/m²      Zuleyka Morrison is here for his Subsequent Wellness visit  Health Risk Assessment:   Patient rates overall health as excellent  Patient feels that their physical health rating is same  Eyesight was rated as slightly worse  Hearing was rated as same  Patient feels that their emotional and mental health rating is same  Pain experienced in the last 7 days has been none  Patient states that he has experienced no weight loss or gain in last 6 months  Depression Screening:   PHQ-2 Score: 0      Fall Risk Screening: In the past year, patient has experienced: no history of falling in past year      Home Safety:  Patient does not have trouble with stairs inside or outside of their home  Patient has working smoke alarms and has no working carbon monoxide detector  Home safety hazards include: none  Nutrition:   Current diet is Regular and Limited junk food  Medications:   Patient is currently taking over-the-counter supplements  OTC medications include: see medication list  Patient is able to manage medications  Activities of Daily Living (ADLs)/Instrumental Activities of Daily Living (IADLs):   Walk and transfer into and out of bed and chair?: Yes  Dress and groom yourself?: Yes    Bathe or shower yourself?: Yes    Feed yourself?  Yes  Do your laundry/housekeeping?: Yes  Manage your money, pay your bills and track your expenses?: Yes  Make your own meals?: Yes    Do your own shopping?: Yes    Previous Hospitalizations:   Any hospitalizations or ED visits within the last 12 months?: No      Advance Care Planning:   Living will: Yes    Advanced directive: No    Five wishes given: Yes      Cognitive Screening:   Provider or family/friend/caregiver concerned regarding cognition?: No    PREVENTIVE SCREENINGS      Cardiovascular Screening:    General: Screening Not Indicated and History Lipid Disorder      Diabetes Screening:     General: Screening Current      Colorectal Cancer Screening:     General: Screening Current      Prostate Cancer Screening:    General: Screening Current      Osteoporosis Screening:    General: Screening Not Indicated      Abdominal Aortic Aneurysm (AAA) Screening:    Risk factors include: age between 73-69 yo        Lung Cancer Screening:     General: Screening Not Indicated      Hepatitis C Screening:    General: Screening Current      Caroline Arthur MD

## 2021-01-27 NOTE — PATIENT INSTRUCTIONS
Data reviewed in detail and compared prior    Coronary artery disease stable without angina-continue with aggressive risk factor modification as below    Hyperlipidemia-at goal on 10 mg atorvastatin    Hypertension-suboptimally controlled today with no recent outpatient blood pressures  Increase lisinopril to 20 mg daily continue metoprolol XL 50 mg daily  Monitor home blood pressures  Consider [employer] or life source blood pressure monitor  Take your blood pressure after resting for 5 minutes with your feet flat on the floor, blood pressure monitor at heart level, measure 3 blood pressure readings 1 minute apart an average them together  Take blood pressure any time of day, but record time of day  Return in 6 weeks with blood pressure monitor for [id number] comparison  Health maintenance-register for COVID vaccine through my chart  Once vaccinated consider Shingrix available at local pharmacy    Health maintenance is otherwise up-to-date

## 2021-02-19 ENCOUNTER — OFFICE VISIT (OUTPATIENT)
Dept: URGENT CARE | Facility: CLINIC | Age: 71
End: 2021-02-19
Payer: MEDICARE

## 2021-02-19 VITALS
OXYGEN SATURATION: 96 % | RESPIRATION RATE: 18 BRPM | DIASTOLIC BLOOD PRESSURE: 95 MMHG | HEART RATE: 68 BPM | TEMPERATURE: 97.7 F | SYSTOLIC BLOOD PRESSURE: 154 MMHG

## 2021-02-19 DIAGNOSIS — R03.0 ELEVATED BLOOD PRESSURE READING: Primary | ICD-10-CM

## 2021-02-19 PROCEDURE — G0463 HOSPITAL OUTPT CLINIC VISIT: HCPCS | Performed by: PHYSICIAN ASSISTANT

## 2021-02-19 PROCEDURE — 99213 OFFICE O/P EST LOW 20 MIN: CPT | Performed by: PHYSICIAN ASSISTANT

## 2021-02-19 NOTE — PROGRESS NOTES
[facility]        NAME: Merleen Hodgkin is a 72 y o  male  : 1950    MRN: [id number]  DATE: 2021  TIME: 3:37 PM    Assessment and Plan   Elevated blood pressure reading [R03 0]  1  Elevated blood pressure reading       158/82 BP checked by myself  Recommend patient continues his follow up with PCP and continues his medication as directed  Recommend BP check morning and night  Patient provided with strict return instructions and when to proceed to ER if any headache, lightheadedness, chest pain or visual disturbances  Patient verbalized understanding  Patient Instructions       Follow up with PCP in 3-5 days  Proceed to  ER if symptoms worsen  Chief Complaint     Chief Complaint   Patient presents with    Hypertension     pt states he has been monitoring his bp at home and has been getting high readings, last bp was 167/106 @1430  pt states he has no symptoms but would like to be evaluated  History of Present Illness        71-year-old male with past medical history of hypertension presents for evaluation of elevated blood pressure read  Patient has been monitoring his blood pressure since 2021 after being seen by primary care  Patient was increased from lisinopril 10 mg to lisinopril 20 mg daily  Patient states he has been monitoring and consistently in the 130s over 80s  He notes after exercise his blood pressure is in the 120s over 80s  Patient states her concern today was he had an elevated read earlier this morning  With his home blood pressure cuff in the 160s/100s  He states he went outside to snow blow and came inside and noticed a decrease in the pressure althought still elevated in the 150s/100s which prompted his visit here  He has not had any visual changes  He denies chest pain  Denies headache, lightheadedness or dizziness  Denies lower extremity edema        Review of Systems   Review of Systems   Constitutional: Negative for chills, fatigue and fever    HENT: Negative for congestion, ear pain, sinus pain, sore throat and trouble swallowing  Eyes: Negative for pain, discharge and redness  Respiratory: Negative for cough, chest tightness, shortness of breath and wheezing  Cardiovascular: Negative for chest pain, palpitations and leg swelling  Gastrointestinal: Negative for abdominal pain, diarrhea, nausea and vomiting  Musculoskeletal: Negative for arthralgias, joint swelling and myalgias  Skin: Negative for rash  Neurological: Negative for dizziness, weakness, numbness and headaches           Current Medications       Current Outpatient Medications:     atorvastatin (LIPITOR) 20 mg tablet, Take 1 tablet (20 mg total) by mouth daily, Disp: 90 tablet, Rfl: 1    clopidogrel (PLAVIX) 75 mg tablet, TAKE 1 TABLET BY MOUTH EVERY DAY, Disp: 90 tablet, Rfl: 3    lisinopril (ZESTRIL) 20 mg tablet, Take 1 tablet (20 mg total) by mouth daily, Disp: 90 tablet, Rfl: 3    metoprolol succinate (TOPROL-XL) 50 mg 24 hr tablet, TAKE 1 TABLET BY MOUTH EVERY DAY, Disp: 90 tablet, Rfl: 3    aspirin 81 MG tablet, Take 1 tablet by mouth daily, Disp: , Rfl:     hydrocortisone 2 5 % cream, Apply to affected areas in the ear and around the nose twice daily as needed, Disp: 30 g, Rfl: 0    neomycin-polymyxin-hydrocortisone (CORTISPORIN) 0 35%-10,000 units/mL-1% otic suspension, Administer 4 drops into the left ear 4 (four) times a day, Disp: 10 mL, Rfl: 0    nitroglycerin (NITROSTAT) 0 4 mg SL tablet, Place 1 tablet under the tongue as needed, Disp: , Rfl:     Current Allergies     Allergies as of 02/19/2021    (No Known Allergies)            The following portions of the patient's history were reviewed and updated as appropriate: allergies, current medications, past family history, past medical history, past social history, past surgical history and problem list      Past Medical History:   Diagnosis Date    Abnormal LFTs     Benign neoplasm of skin     Lumbar canal stenosis     with neurogenic claudication     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, congenital     Nephrotic syndrome     Nonmelanoma skin cancer     last assessed 1/11/18    Old myocardial infarction     Skin cancer     last assessed 1/13/14    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        Past Surgical History:   Procedure Laterality Date    ATHERECTOMY      1 with stent placement  last assessed 6/17/14    CARDIAC CATHETERIZATION      outcome: successful  last assessed 6/17/14    CORONARY ANGIOPLASTY      CORONARY ANGIOPLASTY WITH STENT PLACEMENT      to LAD, diagonal and RCA    MALIGNANT SKIN LESION EXCISION  01/09/2012    Trunk,  SCC chest        Family History   Problem Relation Age of Onset    Heart attack Father 50    Cancer Father     Coronary artery disease Family          Medications have been verified  Objective   /95   Pulse 68   Temp 97 7 °F (36 5 °C)   Resp 18   SpO2 96%        Physical Exam     Physical Exam  Vitals signs and nursing note reviewed  Constitutional:       General: He is not in acute distress  Appearance: Normal appearance  He is well-developed  Cardiovascular:      Rate and Rhythm: Normal rate and regular rhythm  Pulmonary:      Effort: Pulmonary effort is normal       Breath sounds: Normal breath sounds  Abdominal:      General: Bowel sounds are normal       Palpations: Abdomen is soft  Abdomen is not rigid  Tenderness: There is no abdominal tenderness  There is no guarding or rebound  Negative signs include Tipton's sign and McBurney's sign  Hernia: No hernia is present  Skin:     General: Skin is warm and dry

## 2021-02-23 ENCOUNTER — OFFICE VISIT (OUTPATIENT)
Dept: INTERNAL MEDICINE CLINIC | Facility: CLINIC | Age: 71
End: 2021-02-23
Payer: MEDICARE

## 2021-02-23 VITALS
SYSTOLIC BLOOD PRESSURE: 130 MMHG | TEMPERATURE: 97.8 F | WEIGHT: 236 LBS | BODY MASS INDEX: 33.79 KG/M2 | RESPIRATION RATE: 14 BRPM | HEART RATE: 64 BPM | DIASTOLIC BLOOD PRESSURE: 98 MMHG | HEIGHT: 70 IN

## 2021-02-23 DIAGNOSIS — I10 ESSENTIAL HYPERTENSION: Primary | ICD-10-CM

## 2021-02-23 PROCEDURE — 99214 OFFICE O/P EST MOD 30 MIN: CPT | Performed by: INTERNAL MEDICINE

## 2021-02-23 RX ORDER — HYDROCHLOROTHIAZIDE 25 MG/1
25 TABLET ORAL DAILY
Qty: 30 TABLET | Refills: 1 | Status: SHIPPED | OUTPATIENT
Start: 2021-02-23

## 2021-02-23 NOTE — PROGRESS NOTES
Assessment/Plan:    Hypertension-on last visit lisinopril increased from 10-20  He is also on metoprolol 50 daily  Patient has consistently high blood pressure readings at home for past 1 month  Systolic between 156-879, diastolic between   Denied any chest pain shortness of breath  He is taking low-salt diet  He is told to lose weight that will help with blood pressure  Walking 2-3 miles 4 times a week and also doing weightlifting  Will start on HCTZ 25 daily , check CMP and Mg  in 2 weeks  Hyperlipidemia-continue atorvastatin, lipid panel stable      CAD status post stent-denied any chest pain, continue aspirin Plavix       There are no diagnoses linked to this encounter  Subjective:      Patient ID: Jessica Lang is a 72 y o  male  25-year-old male with history of hypertension, hyperlipidemia, CAD status post stent is in office for 1 month follow-up for hypertension  On last visit his lisinopril was increased from 10-20  Patient is recording his blood pressure 2 times daily  Blood pressure readings have been high consistently  Systolic is between 212-950 with diastolic between   Denied pain in his chest,, palpitations, difficulty breathing  Denied any other complaints  He is registered on [employer] to get COVID shot  The following portions of the patient's history were reviewed and updated as appropriate: allergies, current medications, past family history, past medical history, past social history, past surgical history and problem list     Review of Systems   Constitutional: Negative for chills and fever  HENT: Negative for congestion and postnasal drip  Respiratory: Negative for cough, chest tightness and shortness of breath  Cardiovascular: Negative for chest pain, palpitations and leg swelling  Gastrointestinal: Negative for abdominal pain, diarrhea, nausea and vomiting  Genitourinary: Negative for dysuria and urgency     Musculoskeletal: Negative for back pain    Neurological: Negative for dizziness and light-headedness  Psychiatric/Behavioral: The patient is not nervous/anxious  Objective:      /98 (BP Location: Left arm, Patient Position: Sitting)   Pulse 64   Temp 97 8 °F (36 6 °C) (Tympanic)   Resp 14   Ht 5' 10" (1 778 m)   Wt 107 kg (236 lb)   BMI 33 86 kg/m²          Physical Exam  Constitutional:       Appearance: Normal appearance  HENT:      Head: Normocephalic and atraumatic  Nose: Nose normal    Neck:      Musculoskeletal: Normal range of motion and neck supple  Cardiovascular:      Rate and Rhythm: Normal rate and regular rhythm  Pulmonary:      Effort: Pulmonary effort is normal  No respiratory distress  Breath sounds: Normal breath sounds  No wheezing  Abdominal:      General: Abdomen is flat  Bowel sounds are normal       Palpations: Abdomen is soft  Tenderness: There is no abdominal tenderness  Musculoskeletal: Normal range of motion  Right lower leg: Edema present  Left lower leg: Edema present  Comments: Trace edema   Skin:     General: Skin is warm  Capillary Refill: Capillary refill takes less than 2 seconds  Neurological:      General: No focal deficit present  Mental Status: He is alert and oriented to person, place, and time  Mental status is at baseline     Psychiatric:         Mood and Affect: Mood normal          Behavior: Behavior normal

## 2021-03-10 DIAGNOSIS — Z23 ENCOUNTER FOR IMMUNIZATION: ICD-10-CM

## 2021-03-14 DIAGNOSIS — E78.2 COMBINED HYPERLIPIDEMIA: ICD-10-CM

## 2021-03-14 RX ORDER — ATORVASTATIN CALCIUM 20 MG/1
TABLET, FILM COATED ORAL
Qty: 90 TABLET | Refills: 1 | Status: SHIPPED | OUTPATIENT
Start: 2021-03-14 | End: 2022-04-08

## 2021-03-17 ENCOUNTER — IMMUNIZATIONS (OUTPATIENT)
Dept: FAMILY MEDICINE CLINIC | Facility: HOSPITAL | Age: 71
End: 2021-03-17

## 2021-03-17 DIAGNOSIS — Z23 ENCOUNTER FOR IMMUNIZATION: Primary | ICD-10-CM

## 2021-03-17 PROCEDURE — 91301 SARS-COV-2 / COVID-19 MRNA VACCINE (MODERNA) 100 MCG: CPT

## 2021-03-17 PROCEDURE — 0011A SARS-COV-2 / COVID-19 MRNA VACCINE (MODERNA) 100 MCG: CPT

## 2021-04-15 ENCOUNTER — IMMUNIZATIONS (OUTPATIENT)
Dept: FAMILY MEDICINE CLINIC | Facility: HOSPITAL | Age: 71
End: 2021-04-15

## 2021-04-15 DIAGNOSIS — Z23 ENCOUNTER FOR IMMUNIZATION: Primary | ICD-10-CM

## 2021-04-15 PROCEDURE — 91301 SARS-COV-2 / COVID-19 MRNA VACCINE (MODERNA) 100 MCG: CPT

## 2021-04-15 PROCEDURE — 0012A SARS-COV-2 / COVID-19 MRNA VACCINE (MODERNA) 100 MCG: CPT

## 2021-05-25 ENCOUNTER — OFFICE VISIT (OUTPATIENT)
Dept: URGENT CARE | Facility: CLINIC | Age: 71
End: 2021-05-25
Payer: MEDICARE

## 2021-05-25 VITALS
BODY MASS INDEX: 32.21 KG/M2 | HEART RATE: 73 BPM | RESPIRATION RATE: 18 BRPM | HEIGHT: 70 IN | TEMPERATURE: 98.4 F | WEIGHT: 225 LBS | OXYGEN SATURATION: 97 %

## 2021-05-25 DIAGNOSIS — H60.502 ACUTE OTITIS EXTERNA OF LEFT EAR, UNSPECIFIED TYPE: Primary | ICD-10-CM

## 2021-05-25 PROCEDURE — G0463 HOSPITAL OUTPT CLINIC VISIT: HCPCS | Performed by: PHYSICIAN ASSISTANT

## 2021-05-25 PROCEDURE — 99213 OFFICE O/P EST LOW 20 MIN: CPT | Performed by: PHYSICIAN ASSISTANT

## 2021-05-25 RX ORDER — OFLOXACIN 3 MG/ML
10 SOLUTION AURICULAR (OTIC) DAILY
Qty: 10 ML | Refills: 0 | Status: SHIPPED | OUTPATIENT
Start: 2021-05-25 | End: 2021-06-01

## 2021-05-25 NOTE — PROGRESS NOTES
[facility]        NAME: Lokesh Gross is a 72 y o  male  : 1950    MRN: [id number]  DATE: May 25, 2021  TIME: 9:14 AM    Assessment and Plan   Acute otitis externa of left ear, unspecified type [H60 502]  1  Acute otitis externa of left ear, unspecified type  ofloxacin (FLOXIN) 0 3 % otic solution         Patient Instructions     Patient Instructions     Otitis Externa   WHAT YOU NEED TO KNOW:   Otitis externa, or swimmer's ear, is an infection in the outer ear canal  This canal goes from the outside of the ear to the eardrum  DISCHARGE INSTRUCTIONS:   Return to the emergency department if:   · You have severe ear pain  · You are suddenly unable to hear at all  · You have new swelling in your face, behind your ears, or in your neck  · You suddenly cannot move part of your face  · Your face suddenly feels numb  Contact your healthcare provider if:   · You have a fever  · Your signs and symptoms do not get better after 2 days of treatment  · Your signs and symptoms go away for a time, but then come back  · You have questions or concerns about your condition or care  Medicines:   · NSAIDs , such as ibuprofen, help decrease swelling, pain, and fever  This medicine is available with or without a doctor's order  NSAIDs can cause stomach bleeding or kidney problems in certain people  If you take blood thinner medicine, always ask if NSAIDs are safe for you  Always read the medicine label and follow directions  Do not give these medicines to children under 10months of age without direction from your child's healthcare provider  · Acetaminophen  decreases pain and fever  It is available without a doctor's order  Ask how much to take and how often to take it  Follow directions  Acetaminophen can cause liver damage if not taken correctly  · Ear drops  that contain an antibiotic may be given  The antibiotic helps treat a bacterial infection   You may also be given steroid medicine  The steroid helps decrease redness, swelling, and pain  · Take your medicine as directed  Contact your healthcare provider if you think your medicine is not helping or if you have side effects  Tell him or her if you are allergic to any medicine  Keep a list of the medicines, vitamins, and herbs you take  Include the amounts, and when and why you take them  Bring the list or the pill bottles to follow-up visits  Carry your medicine list with you in case of an emergency  Follow up with your healthcare provider as directed:  Write down your questions so you remember to ask them during your visits  How to use eardrops:   · Lie down on your side with your infected ear facing up  · Carefully drip the correct number of eardrops into your ear  Have another person help you if possible  · Gently move the outside part of your ear back and forth to help the medicine reach your ear canal      · Stay lying down in the same position (with your ear facing up) for 3 to 5 minutes  Prevent otitis externa:   · Do not put cotton swabs or foreign objects in your ears  · Wrap a clean moist washcloth around your finger, and use it to clean your outer ear and remove extra ear wax  · Use ear plugs when you swim  Dry your outer ears completely after you swim or bathe  © Copyright [address] Information is for End User's use only and may not be sold, redistributed or otherwise used for commercial purposes  All illustrations and images included in CareNotes® are the copyrighted property of [employer] D [employer]  or [address] [place] [address]  The above information is an  only  It is not intended as medical advice for individual conditions or treatments  Talk to your doctor, nurse or pharmacist before following any medical regimen to see if it is safe and effective for you  Follow up with PCP in 3-5 days  Proceed to  ER if symptoms worsen      Chief Complaint     Chief Complaint Patient presents with   [place]     left, started saturday          History of Present Illness       Presents with left ear pain and blockage for the last 3 days  Denies fever, congestion, respiratory symptoms  H/O ear infections presenting similarly  Review of Systems   Review of Systems   Constitutional: Negative for chills, fatigue and fever  HENT: Positive for ear pain  Negative for congestion, ear discharge, postnasal drip, rhinorrhea, sinus pressure, sinus pain and sore throat  Respiratory: Negative for cough, chest tightness, shortness of breath and wheezing  Cardiovascular: Negative for chest pain  Musculoskeletal: Negative for arthralgias and myalgias           Current Medications       Current Outpatient Medications:     aspirin 81 MG tablet, Take 1 tablet by mouth daily, Disp: , Rfl:     atorvastatin (LIPITOR) 20 mg tablet, TAKE 1 TABLET BY MOUTH EVERY DAY, Disp: 90 tablet, Rfl: 1    clopidogrel (PLAVIX) 75 mg tablet, TAKE 1 TABLET BY MOUTH EVERY DAY, Disp: 90 tablet, Rfl: 3    lisinopril (ZESTRIL) 20 mg tablet, Take 1 tablet (20 mg total) by mouth daily, Disp: 90 tablet, Rfl: 3    metoprolol succinate (TOPROL-XL) 50 mg 24 hr tablet, TAKE 1 TABLET BY MOUTH EVERY DAY, Disp: 90 tablet, Rfl: 3    nitroglycerin (NITROSTAT) 0 4 mg SL tablet, Place 1 tablet under the tongue as needed, Disp: , Rfl:     hydrochlorothiazide (HYDRODIURIL) 25 mg tablet, Take 1 tablet (25 mg total) by mouth daily (Patient not taking: Reported on 5/25/2021), Disp: 30 tablet, Rfl: 1    ofloxacin (FLOXIN) 0 3 % otic solution, Administer 10 drops into the left ear daily for 7 days, Disp: 10 mL, Rfl: 0    Current Allergies     Allergies as of 05/25/2021    (No Known Allergies)            The following portions of the patient's history were reviewed and updated as appropriate: allergies, current medications, past family history, past medical history, past social history, past surgical history and problem list  Past Medical History:   Diagnosis Date    Abnormal LFTs     Benign neoplasm of skin     Lumbar canal stenosis     with neurogenic claudication     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, congenital     Nephrotic syndrome     Nonmelanoma skin cancer     last assessed 1/11/18    Old myocardial infarction     Skin cancer     last assessed 1/13/14    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        Past Surgical History:   Procedure Laterality Date    ATHERECTOMY      1 with stent placement  last assessed 6/17/14    CARDIAC CATHETERIZATION      outcome: successful  last assessed 6/17/14    CORONARY ANGIOPLASTY      CORONARY ANGIOPLASTY WITH STENT PLACEMENT      to LAD, diagonal and RCA    MALIGNANT SKIN LESION EXCISION  01/09/2012    Trunk,  SCC chest        Family History   Problem Relation Age of Onset    Heart attack Father 50    Cancer Father     Coronary artery disease Family          Medications have been verified  Objective   Pulse 73   Temp 98 4 °F (36 9 °C) (Temporal)   Resp 18   Ht 5' 10" (1 778 m)   Wt 102 kg (225 lb)   SpO2 97%   BMI 32 28 kg/m²        Physical Exam     Physical Exam  Constitutional:       Appearance: Normal appearance  HENT:      Head: Normocephalic and atraumatic  Right Ear: Tympanic membrane, ear canal and external ear normal       Left Ear: Tympanic membrane and external ear normal       Ears:      Comments: Left ear canal swollen  With white purulent discharge     Nose: Nose normal       Mouth/Throat:      Mouth: Mucous membranes are moist       Pharynx: Oropharynx is clear  Neck:      Musculoskeletal: No muscular tenderness  Neurological:      Mental Status: He is alert     Psychiatric:         Mood and Affect: Mood normal          Behavior: Behavior normal

## 2021-05-25 NOTE — PATIENT INSTRUCTIONS
Otitis Externa   WHAT YOU NEED TO KNOW:   Otitis externa, or swimmer's ear, is an infection in the outer ear canal  This canal goes from the outside of the ear to the eardrum  DISCHARGE INSTRUCTIONS:   Return to the emergency department if:   · You have severe ear pain  · You are suddenly unable to hear at all  · You have new swelling in your face, behind your ears, or in your neck  · You suddenly cannot move part of your face  · Your face suddenly feels numb  Contact your healthcare provider if:   · You have a fever  · Your signs and symptoms do not get better after 2 days of treatment  · Your signs and symptoms go away for a time, but then come back  · You have questions or concerns about your condition or care  Medicines:   · NSAIDs , such as ibuprofen, help decrease swelling, pain, and fever  This medicine is available with or without a doctor's order  NSAIDs can cause stomach bleeding or kidney problems in certain people  If you take blood thinner medicine, always ask if NSAIDs are safe for you  Always read the medicine label and follow directions  Do not give these medicines to children under 10months of age without direction from your child's healthcare provider  · Acetaminophen  decreases pain and fever  It is available without a doctor's order  Ask how much to take and how often to take it  Follow directions  Acetaminophen can cause liver damage if not taken correctly  · Ear drops  that contain an antibiotic may be given  The antibiotic helps treat a bacterial infection  You may also be given steroid medicine  The steroid helps decrease redness, swelling, and pain  · Take your medicine as directed  Contact your healthcare provider if you think your medicine is not helping or if you have side effects  Tell him or her if you are allergic to any medicine  Keep a list of the medicines, vitamins, and herbs you take   Include the amounts, and when and why you take them  Bring the list or the pill bottles to follow-up visits  Carry your medicine list with you in case of an emergency  Follow up with your healthcare provider as directed:  Write down your questions so you remember to ask them during your visits  How to use eardrops:   · Lie down on your side with your infected ear facing up  · Carefully drip the correct number of eardrops into your ear  Have another person help you if possible  · Gently move the outside part of your ear back and forth to help the medicine reach your ear canal      · Stay lying down in the same position (with your ear facing up) for 3 to 5 minutes  Prevent otitis externa:   · Do not put cotton swabs or foreign objects in your ears  · Wrap a clean moist washcloth around your finger, and use it to clean your outer ear and remove extra ear wax  · Use ear plugs when you swim  Dry your outer ears completely after you swim or bathe  © Copyright [address] Information is for End User's use only and may not be sold, redistributed or otherwise used for commercial purposes  All illustrations and images included in CareNotes® are the copyrighted property of [employer]  or [facility]   The above information is an  only  It is not intended as medical advice for individual conditions or treatments  Talk to your doctor, nurse or pharmacist before following any medical regimen to see if it is safe and effective for you

## 2021-06-09 DIAGNOSIS — I25.10 CORONARY ARTERY DISEASE: ICD-10-CM

## 2021-06-09 DIAGNOSIS — I10 BENIGN ESSENTIAL HTN: ICD-10-CM

## 2021-06-09 RX ORDER — METOPROLOL SUCCINATE 50 MG/1
TABLET, EXTENDED RELEASE ORAL
Qty: 90 TABLET | Refills: 3 | Status: SHIPPED | OUTPATIENT
Start: 2021-06-09 | End: 2022-07-07

## 2021-06-09 RX ORDER — CLOPIDOGREL BISULFATE 75 MG/1
TABLET ORAL
Qty: 90 TABLET | Refills: 3 | Status: SHIPPED | OUTPATIENT
Start: 2021-06-09 | End: 2022-07-05

## 2021-07-13 ENCOUNTER — APPOINTMENT (OUTPATIENT)
Dept: LAB | Facility: CLINIC | Age: 71
End: 2021-07-13
Payer: MEDICARE

## 2021-07-13 DIAGNOSIS — I10 ESSENTIAL HYPERTENSION: ICD-10-CM

## 2021-07-13 DIAGNOSIS — I25.10 CORONARY ARTERY DISEASE INVOLVING NATIVE CORONARY ARTERY OF NATIVE HEART WITHOUT ANGINA PECTORIS: ICD-10-CM

## 2021-07-13 DIAGNOSIS — R73.01 IMPAIRED FASTING GLUCOSE: ICD-10-CM

## 2021-07-13 DIAGNOSIS — I10 HYPERTENSION, ESSENTIAL: ICD-10-CM

## 2021-07-13 LAB
ALBUMIN SERPL BCP-MCNC: 3.6 G/DL (ref 3.5–5)
ALP SERPL-CCNC: 64 U/L (ref 46–116)
ALT SERPL W P-5'-P-CCNC: 51 U/L (ref 12–78)
ANION GAP SERPL CALCULATED.3IONS-SCNC: 8 MMOL/L (ref 4–13)
AST SERPL W P-5'-P-CCNC: 28 U/L (ref 5–45)
BASOPHILS # BLD AUTO: 0.08 THOUSANDS/ΜL (ref 0–0.1)
BASOPHILS NFR BLD AUTO: 1 % (ref 0–1)
BILIRUB SERPL-MCNC: 1.37 MG/DL (ref 0.2–1)
BUN SERPL-MCNC: 17 MG/DL (ref 5–25)
CALCIUM SERPL-MCNC: 9 MG/DL (ref 8.3–10.1)
CHLORIDE SERPL-SCNC: 105 MMOL/L (ref 100–108)
CHOLEST SERPL-MCNC: 155 MG/DL (ref 50–200)
CO2 SERPL-SCNC: 24 MMOL/L (ref 21–32)
CREAT SERPL-MCNC: 0.95 MG/DL (ref 0.6–1.3)
EOSINOPHIL # BLD AUTO: 0.52 THOUSAND/ΜL (ref 0–0.61)
EOSINOPHIL NFR BLD AUTO: 9 % (ref 0–6)
ERYTHROCYTE [DISTWIDTH] IN BLOOD BY AUTOMATED COUNT: 12.3 % (ref 11.6–15.1)
EST. AVERAGE GLUCOSE BLD GHB EST-MCNC: 97 MG/DL
GFR SERPL CREATININE-BSD FRML MDRD: 81 ML/MIN/1.73SQ M
GLUCOSE P FAST SERPL-MCNC: 90 MG/DL (ref 65–99)
HBA1C MFR BLD: 5 %
HCT VFR BLD AUTO: 50.4 % (ref 36.5–49.3)
HDLC SERPL-MCNC: 45 MG/DL
HGB BLD-MCNC: 17.7 G/DL (ref 12–17)
IMM GRANULOCYTES # BLD AUTO: 0.01 THOUSAND/UL (ref 0–0.2)
IMM GRANULOCYTES NFR BLD AUTO: 0 % (ref 0–2)
LDLC SERPL CALC-MCNC: 86 MG/DL (ref 0–100)
LYMPHOCYTES # BLD AUTO: 2.07 THOUSANDS/ΜL (ref 0.6–4.47)
LYMPHOCYTES NFR BLD AUTO: 34 % (ref 14–44)
MAGNESIUM SERPL-MCNC: 2.2 MG/DL (ref 1.6–2.6)
MCH RBC QN AUTO: 32.1 PG (ref 26.8–34.3)
MCHC RBC AUTO-ENTMCNC: 35.1 G/DL (ref 31.4–37.4)
MCV RBC AUTO: 92 FL (ref 82–98)
MONOCYTES # BLD AUTO: 0.78 THOUSAND/ΜL (ref 0.17–1.22)
MONOCYTES NFR BLD AUTO: 13 % (ref 4–12)
NEUTROPHILS # BLD AUTO: 2.67 THOUSANDS/ΜL (ref 1.85–7.62)
NEUTS SEG NFR BLD AUTO: 43 % (ref 43–75)
NONHDLC SERPL-MCNC: 110 MG/DL
NRBC BLD AUTO-RTO: 0 /100 WBCS
PLATELET # BLD AUTO: 171 THOUSANDS/UL (ref 149–390)
PMV BLD AUTO: 10.6 FL (ref 8.9–12.7)
POTASSIUM SERPL-SCNC: 4.3 MMOL/L (ref 3.5–5.3)
PROT SERPL-MCNC: 7.3 G/DL (ref 6.4–8.2)
RBC # BLD AUTO: 5.51 MILLION/UL (ref 3.88–5.62)
SODIUM SERPL-SCNC: 137 MMOL/L (ref 136–145)
TRIGL SERPL-MCNC: 118 MG/DL
TSH SERPL DL<=0.05 MIU/L-ACNC: 2.23 UIU/ML (ref 0.36–3.74)
URATE SERPL-MCNC: 6 MG/DL (ref 4.2–8)
WBC # BLD AUTO: 6.13 THOUSAND/UL (ref 4.31–10.16)

## 2021-07-13 PROCEDURE — 84550 ASSAY OF BLOOD/URIC ACID: CPT

## 2021-07-13 PROCEDURE — 83735 ASSAY OF MAGNESIUM: CPT

## 2021-07-13 PROCEDURE — 83036 HEMOGLOBIN GLYCOSYLATED A1C: CPT

## 2021-07-13 PROCEDURE — 85025 COMPLETE CBC W/AUTO DIFF WBC: CPT

## 2021-07-13 PROCEDURE — 80061 LIPID PANEL: CPT

## 2021-07-13 PROCEDURE — 36415 COLL VENOUS BLD VENIPUNCTURE: CPT

## 2021-07-13 PROCEDURE — 84443 ASSAY THYROID STIM HORMONE: CPT

## 2021-07-13 PROCEDURE — 80053 COMPREHEN METABOLIC PANEL: CPT

## 2021-07-15 ENCOUNTER — OFFICE VISIT (OUTPATIENT)
Dept: INTERNAL MEDICINE CLINIC | Facility: CLINIC | Age: 71
End: 2021-07-15
Payer: MEDICARE

## 2021-07-15 VITALS
DIASTOLIC BLOOD PRESSURE: 84 MMHG | OXYGEN SATURATION: 97 % | RESPIRATION RATE: 16 BRPM | BODY MASS INDEX: 33.13 KG/M2 | WEIGHT: 231.4 LBS | TEMPERATURE: 97.8 F | HEIGHT: 70 IN | HEART RATE: 63 BPM | SYSTOLIC BLOOD PRESSURE: 138 MMHG

## 2021-07-15 DIAGNOSIS — I10 HYPERTENSION, ESSENTIAL: ICD-10-CM

## 2021-07-15 DIAGNOSIS — Z12.5 SCREENING FOR PROSTATE CANCER: ICD-10-CM

## 2021-07-15 DIAGNOSIS — R73.01 IMPAIRED FASTING GLUCOSE: Primary | ICD-10-CM

## 2021-07-15 DIAGNOSIS — G47.33 OSA (OBSTRUCTIVE SLEEP APNEA): ICD-10-CM

## 2021-07-15 DIAGNOSIS — I25.10 CORONARY ARTERY DISEASE INVOLVING NATIVE CORONARY ARTERY OF NATIVE HEART WITHOUT ANGINA PECTORIS: ICD-10-CM

## 2021-07-15 DIAGNOSIS — E78.2 COMBINED HYPERLIPIDEMIA: ICD-10-CM

## 2021-07-15 PROCEDURE — 99214 OFFICE O/P EST MOD 30 MIN: CPT | Performed by: INTERNAL MEDICINE

## 2021-07-15 NOTE — PROGRESS NOTES
Assessment/Plan:    Diagnoses and all orders for this visit:    Impaired fasting glucose  -     Hemoglobin A1C; Future    ERIC (obstructive sleep apnea)    Coronary artery disease involving native coronary artery of native heart without angina pectoris    Hypertension, essential  -     CBC and differential; Future  -     Comprehensive metabolic panel; Future    Combined hyperlipidemia  -     Lipid panel; Future  -     TSH, 3rd generation with Free T4 reflex; Future    Screening for prostate cancer  -     PSA, Total Screen; Future              Patient Instructions   Data reviewed in detail and compared prior    Coronary artery disease stable without angina-continue aggressive risk factor modification    Hypertension stable on present regimen    Hyperlipidemia at goal on statin    Polycythemia-patient encouraged to drink more water, prior workup with JAK2 was negative    Impaired fasting glucose stable    Sleep apnea-monitor for symptoms of daytime somnolence or waking with headache    Routine follow-up after labs in 6 months, sooner as needed  Subjective:      Patient ID: Michael Escobedo is a 72 y o  male    F/u mmp and review labs  Feeling well   BPH-  No issues w/ incomplete voids, but 1-2x nocturia, no urgency or incontinence, good stream    Exercising regularly at the gym and walking or cycling   CAD-following w/ cardiology  No angina  HTN/HPL-taking rx as directed, home bp's 120-130's/70's  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, didn't tolerate cpap d/t noise and discomfort  No eds or am headache  Polycythemia-admits not enough water           Current Outpatient Medications:     aspirin 81 MG tablet, Take 1 tablet by mouth daily, Disp: , Rfl:     atorvastatin (LIPITOR) 20 mg tablet, TAKE 1 TABLET BY MOUTH EVERY DAY, Disp: 90 tablet, Rfl: 1    clopidogrel (PLAVIX) 75 mg tablet, TAKE 1 TABLET BY MOUTH EVERY DAY, Disp: 90 tablet, Rfl: 3    hydrochlorothiazide (HYDRODIURIL) 25 mg tablet, Take 1 tablet (25 mg total) by mouth daily, Disp: 30 tablet, Rfl: 1    lisinopril (ZESTRIL) 20 mg tablet, Take 1 tablet (20 mg total) by mouth daily, Disp: 90 tablet, Rfl: 3    metoprolol succinate (TOPROL-XL) 50 mg 24 hr tablet, TAKE 1 TABLET BY MOUTH EVERY DAY, Disp: 90 tablet, Rfl: 3    nitroglycerin (NITROSTAT) 0 4 mg SL tablet, Place 1 tablet under the tongue as needed, Disp: , Rfl:     Recent Results (from the past 1008 hour(s))   CBC and differential    Collection Time: 07/13/21  7:09 AM   Result Value Ref Range    WBC 6 13 4 31 - 10 16 Thousand/uL    RBC 5 51 3 88 - 5 62 Million/uL    Hemoglobin 17 7 (H) 12 0 - 17 0 g/dL    Hematocrit 50 4 (H) 36 5 - 49 3 %    MCV 92 82 - 98 fL    MCH 32 1 26 8 - 34 3 pg    MCHC 35 1 31 4 - 37 4 g/dL    RDW 12 3 11 6 - 15 1 %    MPV 10 6 8 9 - 12 7 fL    Platelets 830 084 - 363 Thousands/uL    nRBC 0 /100 WBCs    Neutrophils Relative 43 43 - 75 %    Immat GRANS % 0 0 - 2 %    Lymphocytes Relative 34 14 - 44 %    Monocytes Relative 13 (H) 4 - 12 %    Eosinophils Relative 9 (H) 0 - 6 %    Basophils Relative 1 0 - 1 %    Neutrophils Absolute 2 67 1 85 - 7 62 Thousands/µL    Immature Grans Absolute 0 01 0 00 - 0 20 Thousand/uL    Lymphocytes Absolute 2 07 0 60 - 4 47 Thousands/µL    Monocytes Absolute 0 78 0 17 - 1 22 Thousand/µL    Eosinophils Absolute 0 52 0 00 - 0 61 Thousand/µL    Basophils Absolute 0 08 0 00 - 0 10 Thousands/µL   Comprehensive metabolic panel    Collection Time: 07/13/21  7:09 AM   Result Value Ref Range    Sodium 137 136 - 145 mmol/L    Potassium 4 3 3 5 - 5 3 mmol/L    Chloride 105 100 - 108 mmol/L    CO2 24 21 - 32 mmol/L    ANION GAP 8 4 - 13 mmol/L    BUN 17 5 - 25 mg/dL    Creatinine 0 95 0 60 - 1 30 mg/dL    Glucose, Fasting 90 65 - 99 mg/dL    Calcium 9 0 8 3 - 10 1 mg/dL    AST 28 5 - 45 U/L    ALT 51 12 - 78 U/L    Alkaline Phosphatase 64 46 - 116 U/L    Total Protein 7 3 6 4 - 8 2 g/dL    Albumin 3 6 3 5 - 5 0 g/dL    Total Bilirubin 1 37 (H) 0 20 - 1 00 mg/dL    eGFR 81 ml/min/1 73sq m   Lipid panel    Collection Time: 07/13/21  7:09 AM   Result Value Ref Range    Cholesterol 155 50 - 200 mg/dL    Triglycerides 118 <=150 mg/dL    HDL, Direct 45 >=40 mg/dL    LDL Calculated 86 0 - 100 mg/dL    Non-HDL-Chol (CHOL-HDL) 110 mg/dl   Hemoglobin A1C    Collection Time: 07/13/21  7:09 AM   Result Value Ref Range    Hemoglobin A1C 5 0 Normal 3 8-5 6%; PreDiabetic 5 7-6 4%; Diabetic >=6 5%; Glycemic control for adults with diabetes <7 0% %    EAG 97 mg/dl   TSH, 3rd generation with Free T4 reflex    Collection Time: 07/13/21  7:09 AM   Result Value Ref Range    TSH 3RD GENERATON 2 230 0 358 - 3 740 uIU/mL   Uric acid    Collection Time: 07/13/21  7:09 AM   Result Value Ref Range    Uric Acid 6 0 4 2 - 8 0 mg/dL   Magnesium    Collection Time: 07/13/21  7:09 AM   Result Value Ref Range    Magnesium 2 2 1 6 - 2 6 mg/dL       The following portions of the patient's history were reviewed and updated as appropriate: allergies, current medications, past family history, past medical history, past social history, past surgical history and problem list      Review of Systems   Constitutional: Negative for appetite change, chills, diaphoresis, fatigue, fever and unexpected weight change  HENT: Negative for congestion, hearing loss and rhinorrhea  Eyes: Negative for visual disturbance  Respiratory: Negative for cough, chest tightness, shortness of breath and wheezing  Cardiovascular: Negative for chest pain, palpitations and leg swelling  Gastrointestinal: Negative for abdominal pain and blood in stool  Endocrine: Negative for cold intolerance, heat intolerance, polydipsia and polyuria  Genitourinary: Negative for difficulty urinating, dysuria, frequency and urgency  Musculoskeletal: Negative for arthralgias and myalgias  Skin: Negative for rash  Neurological: Negative for dizziness, weakness, light-headedness and headaches  Hematological: Does not bruise/bleed easily  Psychiatric/Behavioral: Negative for dysphoric mood and sleep disturbance  Objective:      Vitals:    07/15/21 1332   BP: 138/84   Pulse: 63   Resp: 16   Temp: 97 8 °F (36 6 °C)   SpO2: 97%          Physical Exam  Constitutional:       Appearance: He is well-developed  HENT:      Head: Normocephalic and atraumatic  Nose: Nose normal    Eyes:      General: No scleral icterus  Conjunctiva/sclera: Conjunctivae normal       Pupils: Pupils are equal, round, and reactive to light  Neck:      Thyroid: No thyromegaly  Vascular: No JVD  Trachea: No tracheal deviation  Cardiovascular:      Rate and Rhythm: Normal rate and regular rhythm  Heart sounds: No murmur heard  No friction rub  No gallop  Pulmonary:      Effort: Pulmonary effort is normal  No respiratory distress  Breath sounds: Normal breath sounds  No wheezing or rales  Musculoskeletal:         General: No deformity  Cervical back: Normal range of motion and neck supple  Lymphadenopathy:      Cervical: No cervical adenopathy  Skin:     General: Skin is warm and dry  Coloration: Skin is not pale  Findings: No erythema or rash  Neurological:      Mental Status: He is alert and oriented to person, place, and time  Cranial Nerves: No cranial nerve deficit  Psychiatric:         Behavior: Behavior normal          Thought Content:  Thought content normal          Judgment: Judgment normal

## 2021-07-15 NOTE — PATIENT INSTRUCTIONS
Data reviewed in detail and compared prior    Coronary artery disease stable without angina-continue aggressive risk factor modification    Hypertension stable on present regimen    Hyperlipidemia at goal on statin    Polycythemia-patient encouraged to drink more water, prior workup with JAK2 was negative    Impaired fasting glucose stable    Sleep apnea-monitor for symptoms of daytime somnolence or waking with headache    Routine follow-up after labs in 6 months, sooner as needed

## 2021-07-22 ENCOUNTER — APPOINTMENT (OUTPATIENT)
Dept: RADIOLOGY | Facility: CLINIC | Age: 71
End: 2021-07-22
Payer: MEDICARE

## 2021-07-22 ENCOUNTER — OFFICE VISIT (OUTPATIENT)
Dept: URGENT CARE | Facility: CLINIC | Age: 71
End: 2021-07-22
Payer: MEDICARE

## 2021-07-22 ENCOUNTER — TELEPHONE (OUTPATIENT)
Dept: INTERNAL MEDICINE CLINIC | Facility: CLINIC | Age: 71
End: 2021-07-22

## 2021-07-22 VITALS
BODY MASS INDEX: 32.93 KG/M2 | WEIGHT: 230 LBS | HEIGHT: 70 IN | RESPIRATION RATE: 18 BRPM | OXYGEN SATURATION: 95 % | HEART RATE: 85 BPM | TEMPERATURE: 100.4 F

## 2021-07-22 DIAGNOSIS — R68.83 CHILLS: ICD-10-CM

## 2021-07-22 DIAGNOSIS — R52 BODY ACHES: ICD-10-CM

## 2021-07-22 DIAGNOSIS — R52 BODY ACHES: Primary | ICD-10-CM

## 2021-07-22 DIAGNOSIS — R53.1 WEAKNESS: ICD-10-CM

## 2021-07-22 PROCEDURE — 71046 X-RAY EXAM CHEST 2 VIEWS: CPT

## 2021-07-22 PROCEDURE — U0005 INFEC AGEN DETEC AMPLI PROBE: HCPCS | Performed by: PHYSICIAN ASSISTANT

## 2021-07-22 PROCEDURE — U0003 INFECTIOUS AGENT DETECTION BY NUCLEIC ACID (DNA OR RNA); SEVERE ACUTE RESPIRATORY SYNDROME CORONAVIRUS 2 (SARS-COV-2) (CORONAVIRUS DISEASE [COVID-19]), AMPLIFIED PROBE TECHNIQUE, MAKING USE OF HIGH THROUGHPUT TECHNOLOGIES AS DESCRIBED BY CMS-2020-01-R: HCPCS | Performed by: PHYSICIAN ASSISTANT

## 2021-07-22 PROCEDURE — 99213 OFFICE O/P EST LOW 20 MIN: CPT | Performed by: PHYSICIAN ASSISTANT

## 2021-07-22 PROCEDURE — G0463 HOSPITAL OUTPT CLINIC VISIT: HCPCS | Performed by: PHYSICIAN ASSISTANT

## 2021-07-22 NOTE — PATIENT INSTRUCTIONS
You can take vitamin D3 2000 IU daily, vitamin-C 1 g every 12 hours, and a daily multivitamin  Please check your sugars more frequently  Call your primary care provider and schedule a follow-up tele visit within the next 3 days  Follow CDC guidelines for self quarantine as discussed  [address]    Your healthcare provider and/or public health staff have evaluated you and have determined that you do not need to remain in the hospital at this time  At this time you can be isolated at home where you will be monitored by staff from your local or state health department  You should carefully follow the prevention and isolation steps below until a healthcare provider or local or state health department says that you can return to your normal activities  Stay home except to get medical care    People who are mildly ill with COVID-19 are able to isolate at home during their illness  You should restrict activities outside your home, except for getting medical care  Do not go to work, school, or public areas  Avoid using public transportation, ride-sharing, or taxis  Separate yourself from other people and animals in your home    People: As much as possible, you should stay in a specific room and away from other people in your home  Also, you should use a separate bathroom, if available  Animals: You should restrict contact with pets and other animals while you are sick with COVID-19, just like you would around other people  Although there have not been reports of pets or other animals becoming sick with COVID-19, it is still recommended that people sick with COVID-19 limit contact with animals until more information is known about the virus  When possible, have another member of your household care for your animals while you are sick  If you are sick with COVID-19, avoid contact with your pet, including petting, snuggling, being kissed or licked, and sharing food   If you must care for your pet or be around animals while you are sick, wash your hands before and after you interact with pets and wear a facemask  See COVID-19 and Animals for more information  Call ahead before visiting your doctor    If you have a medical appointment, call the healthcare provider and tell them that you have or may have COVID-19  This will help the healthcare providers office take steps to keep other people from getting infected or exposed  Wear a facemask    You should wear a facemask when you are around other people (e g , sharing a room or vehicle) or pets and before you enter a healthcare providers office  If you are not able to wear a facemask (for example, because it causes trouble breathing), then people who live with you should not stay in the same room with you, or they should wear a facemask if they enter your room  Cover your coughs and sneezes    Cover your mouth and nose with a tissue when you cough or sneeze  Throw used tissues in a lined trash can  Immediately wash your hands with soap and water for at least 20 seconds or, if soap and water are not available, clean your hands with an alcohol-based hand  that contains at least 60% alcohol  Clean your hands often    Wash your hands often with soap and water for at least 20 seconds, especially after blowing your nose, coughing, or sneezing; going to the bathroom; and before eating or preparing food  If soap and water are not readily available, use an alcohol-based hand  with at least 60% alcohol, covering all surfaces of your hands and rubbing them together until they feel dry  Soap and water are the best option if hands are visibly dirty  Avoid touching your eyes, nose, and mouth with unwashed hands  Avoid sharing personal household items    You should not share dishes, drinking glasses, cups, eating utensils, towels, or bedding with other people or pets in your home   After using these items, they should be washed thoroughly with soap and water  Clean all high-touch surfaces everyday    High touch surfaces include counters, tabletops, doorknobs, bathroom fixtures, toilets, phones, keyboards, tablets, and bedside tables  Also, clean any surfaces that may have blood, stool, or body fluids on them  Use a household cleaning spray or wipe, according to the label instructions  Labels contain instructions for safe and effective use of the cleaning product including precautions you should take when applying the product, such as wearing gloves and making sure you have good ventilation during use of the product  Monitor your symptoms    Seek prompt medical attention if your illness is worsening (e g , difficulty breathing)  Before seeking care, call your healthcare provider and tell them that you have, or are being evaluated for, COVID-19  Put on a facemask before you enter the facility  These steps will help the healthcare providers office to keep other people in the office or waiting room from getting infected or exposed  Ask your healthcare provider to call the local or [facility] health department  Persons who are placed under active monitoring or facilitated self-monitoring should follow instructions provided by their local health department or occupational health professionals, as appropriate  If you have a medical emergency and need to call 911, notify the dispatch personnel that you have, or are being evaluated for COVID-19  If possible, put on a facemask before emergency medical services arrive      Discontinuing home isolation    Patients with confirmed COVID-19 should remain under home isolation precautions until the following conditions are met:   - They have had no fever for at least 24 hours (that is one full day of no fever without the use medicine that reduces fevers)  AND  - other symptoms have improved (for example, when their cough or shortness of breath have improved)  AND  - If had mild or moderate illness, at least 10 days have passed since their symptoms first appeared or if severe illness (needed oxygen) or immunosuppressed, at least 20 days have passed since symptoms first appeared  Patients with confirmed COVID-19 should also notify close contacts (including their workplace) and ask that they self-quarantine  Currently, close contact is defined as being within 6 feet for 15 minutes or more from the period 24 hours starting 48 hours before symptom onset to the time at which the patient went into isolation  Close contacts of patients diagnosed with COVID-19 should be instructed by the patient to self-quarantine for 14 days from the last time of their last contact with the patient       Source: [employer]

## 2021-07-22 NOTE — TELEPHONE ENCOUNTER
Would like an appt with Dr Ngozi Scherer tomorrow  - f/u from Urgent Care     Dr Ngozi Scherer only has 2 SAME DAYS    How does he want to handle this?      Chest x-ray was done in Urgent Care, clear; no pneumonia  Reg COVID done- no results yet    Yesterday a rapid COVID was done- negative

## 2021-07-22 NOTE — PROGRESS NOTES
[id number]           NAME: Michael Fierro is a 72 y o  male  : 1950    MRN: [id number]  DATE: 2021  TIME: 9:52 AM    Assessment and Plan   Body aches [R52]  1  Body aches  XR chest pa & lateral    Novel Coronavirus (Covid-19),PCR [facility] - Office Collection   2  Chills     3  Weakness         Patient Instructions   You can take vitamin D3 2000 IU daily, vitamin-C 1 g every 12 hours, and a daily multivitamin  Please check your sugars more frequently  Call your primary care provider and schedule a follow-up tele visit within the next 3 days  Follow CDC guidelines for self quarantine as discussed  [address]    Your healthcare provider and/or public health staff have evaluated you and have determined that you do not need to remain in the hospital at this time  At this time you can be isolated at home where you will be monitored by staff from your local or state health department  You should carefully follow the prevention and isolation steps below until a healthcare provider or local or state health department says that you can return to your normal activities  Stay home except to get medical care    People who are mildly ill with COVID-19 are able to isolate at home during their illness  You should restrict activities outside your home, except for getting medical care  Do not go to work, school, or public areas  Avoid using public transportation, ride-sharing, or taxis  Separate yourself from other people and animals in your home    People: As much as possible, you should stay in a specific room and away from other people in your home  Also, you should use a separate bathroom, if available  Animals: You should restrict contact with pets and other animals while you are sick with COVID-19, just like you would around other people   Although there have not been reports of pets or other animals becoming sick with COVID-19, it is still recommended that people sick with COVID-19 limit contact with animals until more information is known about the virus  When possible, have another member of your household care for your animals while you are sick  If you are sick with COVID-19, avoid contact with your pet, including petting, snuggling, being kissed or licked, and sharing food  If you must care for your pet or be around animals while you are sick, wash your hands before and after you interact with pets and wear a facemask  See COVID-19 and Animals for more information  Call ahead before visiting your doctor    If you have a medical appointment, call the healthcare provider and tell them that you have or may have COVID-19  This will help the healthcare providers office take steps to keep other people from getting infected or exposed  Wear a facemask    You should wear a facemask when you are around other people (e g , sharing a room or vehicle) or pets and before you enter a healthcare providers office  If you are not able to wear a facemask (for example, because it causes trouble breathing), then people who live with you should not stay in the same room with you, or they should wear a facemask if they enter your room  Cover your coughs and sneezes    Cover your mouth and nose with a tissue when you cough or sneeze  Throw used tissues in a lined trash can  Immediately wash your hands with soap and water for at least 20 seconds or, if soap and water are not available, clean your hands with an alcohol-based hand  that contains at least 60% alcohol  Clean your hands often    Wash your hands often with soap and water for at least 20 seconds, especially after blowing your nose, coughing, or sneezing; going to the bathroom; and before eating or preparing food   If soap and water are not readily available, use an alcohol-based hand  with at least 60% alcohol, covering all surfaces of your hands and rubbing them together until they feel dry   Soap and water are the best option if hands are visibly dirty  Avoid touching your eyes, nose, and mouth with unwashed hands  Avoid sharing personal household items    You should not share dishes, drinking glasses, cups, eating utensils, towels, or bedding with other people or pets in your home  After using these items, they should be washed thoroughly with soap and water  Clean all high-touch surfaces everyday    High touch surfaces include counters, tabletops, doorknobs, bathroom fixtures, toilets, phones, keyboards, tablets, and bedside tables  Also, clean any surfaces that may have blood, stool, or body fluids on them  Use a household cleaning spray or wipe, according to the label instructions  Labels contain instructions for safe and effective use of the cleaning product including precautions you should take when applying the product, such as wearing gloves and making sure you have good ventilation during use of the product  Monitor your symptoms    Seek prompt medical attention if your illness is worsening (e g , difficulty breathing)  Before seeking care, call your healthcare provider and tell them that you have, or are being evaluated for, COVID-19  Put on a facemask before you enter the facility  These steps will help the healthcare providers office to keep other people in the office or waiting room from getting infected or exposed  Ask your healthcare provider to call the local or state health department  Persons who are placed under active monitoring or facilitated self-monitoring should follow instructions provided by their local health department or occupational health professionals, as appropriate  If you have a medical emergency and need to call 911, notify the dispatch personnel that you have, or are being evaluated for COVID-19  If possible, put on a facemask before emergency medical services arrive      Discontinuing home isolation    Patients with confirmed COVID-19 should remain under home isolation precautions until the following conditions are met:   - They have had no fever for at least 24 hours (that is one full day of no fever without the use medicine that reduces fevers)  AND  - other symptoms have improved (for example, when their cough or shortness of breath have improved)  AND  - If had mild or moderate illness, at least 10 days have passed since their symptoms first appeared or if severe illness (needed oxygen) or immunosuppressed, at least 20 days have passed since symptoms first appeared  Patients with confirmed COVID-19 should also notify close contacts (including their workplace) and ask that they self-quarantine  Currently, close contact is defined as being within 6 feet for 15 minutes or more from the period 24 hours starting 48 hours before symptom onset to the time at which the patient went into isolation  Close contacts of patients diagnosed with COVID-19 should be instructed by the patient to self-quarantine for 14 days from the last time of their last contact with the patient  Source: [employer]   To present to the ER if symptoms worsen  Chief Complaint     Chief Complaint   Patient presents with    Chills     started 2 days ago    Fatigue    Dizziness         History of Present Illness   Stevan Meeks presents to the clinic with his wife c/o      Fully vaccinated agianst covid since march  Yesterday tested for covid and negative, but would like to repeat today as well  Fatigue  This is a new problem  The current episode started in the past 7 days  The problem occurs constantly  The problem has been unchanged  Associated symptoms include arthralgias, chills, fatigue, a fever and myalgias  Pertinent negatives include no congestion, coughing, headaches, nausea, sore throat or vomiting  Nothing aggravates the symptoms  He has tried nothing for the symptoms  The treatment provided no relief  Generalized Body Aches  The current episode started yesterday  The problem occurs constantly  The problem is unchanged  Associated symptoms include fatigue, a fever and muscle aches  Pertinent negatives include no congestion, ear discharge, ear pain, eye discharge, eye itching, eye pain, eye redness, headaches, photophobia, sore throat, coughing, shortness of breath, wheezing, diarrhea, nausea or vomiting  Past treatments include nothing  The treatment provided no relief  The fever has been present for less than 1 day  Review of Systems   Review of Systems   Constitutional: Positive for chills, fatigue and fever  HENT: Negative for congestion, ear discharge, ear pain, facial swelling and sore throat  Eyes: Negative for photophobia, pain, discharge, redness, itching and visual disturbance  Respiratory: Negative for apnea, cough, chest tightness, shortness of breath and wheezing  Cardiovascular: Negative for palpitations  Gastrointestinal: Negative for diarrhea, nausea and vomiting  Musculoskeletal: Positive for arthralgias and myalgias  Skin: Negative for color change and wound  Neurological: Positive for dizziness  Negative for headaches  Hematological: Negative for adenopathy           Current Medications     Long-Term Medications   Medication Sig Dispense Refill    aspirin 81 MG tablet Take 1 tablet by mouth daily      atorvastatin (LIPITOR) 20 mg tablet TAKE 1 TABLET BY MOUTH EVERY DAY 90 tablet 1    clopidogrel (PLAVIX) 75 mg tablet TAKE 1 TABLET BY MOUTH EVERY DAY 90 tablet 3    lisinopril (ZESTRIL) 20 mg tablet Take 1 tablet (20 mg total) by mouth daily 90 tablet 3    metoprolol succinate (TOPROL-XL) 50 mg 24 hr tablet TAKE 1 TABLET BY MOUTH EVERY DAY 90 tablet 3    nitroglycerin (NITROSTAT) 0 4 mg SL tablet Place 1 tablet under the tongue as needed      hydrochlorothiazide (HYDRODIURIL) 25 mg tablet Take 1 tablet (25 mg total) by mouth daily (Patient not taking: Reported on 7/22/2021) 30 tablet 1       Current Allergies     Allergies as of 07/22/2021    (No Known Allergies)            The following portions of the patient's history were reviewed and updated as appropriate: allergies, current medications, past family history, past medical history, past social history, past surgical history and problem list   Past Medical History:   Diagnosis Date    Abnormal LFTs     Benign neoplasm of skin     Lumbar canal stenosis     with neurogenic claudication     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, congenital     Nephrotic syndrome     Nonmelanoma skin cancer     last assessed 1/11/18    Old myocardial infarction     Skin cancer     last assessed 1/13/14    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      Past Surgical History:   Procedure Laterality Date    ATHERECTOMY      1 with stent placement  last assessed 6/17/14    CARDIAC CATHETERIZATION      outcome: successful  last assessed 6/17/14    CORONARY ANGIOPLASTY      CORONARY ANGIOPLASTY WITH STENT PLACEMENT      to LAD, diagonal and RCA    MALIGNANT SKIN LESION EXCISION  01/09/2012    Trunk,  SCC chest      Social History     Socioeconomic History    Marital status: /Civil Union     Spouse name: Not on file    Number of children: Not on file    Years of education: Not on file    Highest education level: Not on file   Occupational History    Occupation: retired [employer]     Tobacco Use    Smoking status: Never Smoker    Smokeless tobacco: Never Used   Vaping Use    Vaping Use: Never used   Substance and Sexual Activity    Alcohol use:  Yes     Alcohol/week: 2 0 standard drinks     Types: 2 Shots of liquor per week     Comment: Wine consumption (__glasses/day)     Drug use: No    Sexual activity: Not Currently   Other Topics Concern    Not on file   Social History Narrative    DME: CPAP    Living independently with spouse     No advance directives     Social Determinants of Health     Financial Resource Strain:     Difficulty of Paying Living Expenses:    Food Insecurity:     Worried About Running Out of Food in the Last Year:     [address] in the Last Year:    Transportation Needs:     Lack of Transportation (Medical):  Lack of Transportation (Non-Medical):    Physical Activity: Sufficiently Active    Days of Exercise per Week: 4 days    Minutes of Exercise per Session: 60 min   Stress: No Stress Concern Present    Feeling of Stress : Not at all   Social Connections:     Frequency of Communication with Friends and Family:     Frequency of Social Gatherings with Friends and Family:     Attends [employer] Services:     Active Member of Clubs or Organizations:     Attends Club or Organization Meetings:     Marital Status:    Intimate Partner Violence:     Fear of Current or Ex-Partner:     Emotionally Abused:     Physically Abused:     Sexually Abused:        Objective   Pulse 85   Temp 100 4 °F (38 °C) (Temporal)   Resp 18   Ht 5' 10" (1 778 m)   Wt 104 kg (230 lb)   SpO2 95%   BMI 33 00 kg/m²      Physical Exam     Physical Exam  Vitals and nursing note reviewed  Constitutional:       General: He is not in acute distress  Appearance: He is well-developed  He is not diaphoretic  HENT:      Head: Normocephalic and atraumatic  Right Ear: External ear normal  Tympanic membrane is scarred  Left Ear: External ear normal  Tympanic membrane is scarred  Nose: Nose normal       Mouth/Throat:      Mouth: Mucous membranes are moist       Pharynx: Oropharynx is clear  No oropharyngeal exudate or posterior oropharyngeal erythema  Eyes:      General: No scleral icterus  Right eye: No discharge  Left eye: No discharge  Conjunctiva/sclera: Conjunctivae normal       Pupils: Pupils are equal, round, and reactive to light        Visual Fields: Right eye visual fields normal and left eye visual fields normal    Cardiovascular:      Rate and Rhythm: Normal rate and regular

## 2021-07-22 NOTE — TELEPHONE ENCOUNTER
FYI: Dr Louis Rivas     Pt having terrible chills for 3 days   Fatigue and muscle aches  COVID test done yesterday, negative  Not doing well    Dr Louis Rivas booked 100%  Daksha Tiffaniemolly is out of office    Didn't want to schedule for tomorrow  This has to be taken care of  Taking hm to Urgent Care     Didn't want to see anyone else in the office

## 2021-07-23 ENCOUNTER — TELEPHONE (OUTPATIENT)
Dept: OTHER | Facility: OTHER | Age: 71
End: 2021-07-23

## 2021-07-23 ENCOUNTER — OFFICE VISIT (OUTPATIENT)
Dept: INTERNAL MEDICINE CLINIC | Facility: CLINIC | Age: 71
End: 2021-07-23
Payer: MEDICARE

## 2021-07-23 ENCOUNTER — LAB (OUTPATIENT)
Dept: LAB | Facility: CLINIC | Age: 71
End: 2021-07-23
Payer: MEDICARE

## 2021-07-23 VITALS
SYSTOLIC BLOOD PRESSURE: 128 MMHG | OXYGEN SATURATION: 97 % | DIASTOLIC BLOOD PRESSURE: 74 MMHG | TEMPERATURE: 100.1 F | WEIGHT: 231 LBS | BODY MASS INDEX: 33.07 KG/M2 | HEIGHT: 70 IN | HEART RATE: 88 BPM

## 2021-07-23 DIAGNOSIS — R50.9 FEVER, UNSPECIFIED FEVER CAUSE: ICD-10-CM

## 2021-07-23 DIAGNOSIS — R50.9 FEVER, UNSPECIFIED FEVER CAUSE: Primary | ICD-10-CM

## 2021-07-23 DIAGNOSIS — H92.02 LEFT EAR PAIN: ICD-10-CM

## 2021-07-23 LAB
ALBUMIN SERPL BCP-MCNC: 3.4 G/DL (ref 3.5–5)
ALP SERPL-CCNC: 72 U/L (ref 46–116)
ALT SERPL W P-5'-P-CCNC: 115 U/L (ref 12–78)
ANION GAP SERPL CALCULATED.3IONS-SCNC: 10 MMOL/L (ref 4–13)
ANISOCYTOSIS BLD QL SMEAR: PRESENT
AST SERPL W P-5'-P-CCNC: 114 U/L (ref 5–45)
BACTERIA UR QL AUTO: ABNORMAL /HPF
BASOPHILS # BLD MANUAL: 0.1 THOUSAND/UL (ref 0–0.1)
BASOPHILS NFR MAR MANUAL: 4 % (ref 0–1)
BILIRUB SERPL-MCNC: 1.84 MG/DL (ref 0.2–1)
BILIRUB UR QL STRIP: ABNORMAL
BUN SERPL-MCNC: 16 MG/DL (ref 5–25)
CALCIUM ALBUM COR SERPL-MCNC: 9.3 MG/DL (ref 8.3–10.1)
CALCIUM SERPL-MCNC: 8.8 MG/DL (ref 8.3–10.1)
CAOX CRY URNS QL MICRO: ABNORMAL /HPF
CHLORIDE SERPL-SCNC: 101 MMOL/L (ref 100–108)
CLARITY UR: CLEAR
CO2 SERPL-SCNC: 23 MMOL/L (ref 21–32)
COARSE GRAN CASTS URNS QL MICRO: ABNORMAL /LPF
COLOR UR: ABNORMAL
CREAT SERPL-MCNC: 1.05 MG/DL (ref 0.6–1.3)
EOSINOPHIL # BLD MANUAL: 0 THOUSAND/UL (ref 0–0.4)
EOSINOPHIL NFR BLD MANUAL: 0 % (ref 0–6)
ERYTHROCYTE [DISTWIDTH] IN BLOOD BY AUTOMATED COUNT: 12.3 % (ref 11.6–15.1)
FINE GRAN CASTS URNS QL MICRO: ABNORMAL /LPF
GFR SERPL CREATININE-BSD FRML MDRD: 72 ML/MIN/1.73SQ M
GLUCOSE SERPL-MCNC: 116 MG/DL (ref 65–140)
GLUCOSE UR STRIP-MCNC: NEGATIVE MG/DL
HCT VFR BLD AUTO: 48.2 % (ref 36.5–49.3)
HGB BLD-MCNC: 17.4 G/DL (ref 12–17)
HGB UR QL STRIP.AUTO: NEGATIVE
KETONES UR STRIP-MCNC: ABNORMAL MG/DL
LEUKOCYTE ESTERASE UR QL STRIP: ABNORMAL
LYMPHOCYTES # BLD AUTO: 0.3 THOUSAND/UL (ref 0.6–4.47)
LYMPHOCYTES # BLD AUTO: 12 % (ref 14–44)
MCH RBC QN AUTO: 32 PG (ref 26.8–34.3)
MCHC RBC AUTO-ENTMCNC: 36.1 G/DL (ref 31.4–37.4)
MCV RBC AUTO: 89 FL (ref 82–98)
METAMYELOCYTES NFR BLD MANUAL: 2 % (ref 0–1)
MONOCYTES # BLD AUTO: 0.23 THOUSAND/UL (ref 0–1.22)
MONOCYTES NFR BLD: 9 % (ref 4–12)
NEUTROPHILS # BLD MANUAL: 1.85 THOUSAND/UL (ref 1.85–7.62)
NEUTS BAND NFR BLD MANUAL: 10 % (ref 0–8)
NEUTS SEG NFR BLD AUTO: 63 % (ref 43–75)
NITRITE UR QL STRIP: NEGATIVE
NON-SQ EPI CELLS URNS QL MICRO: ABNORMAL /HPF
NRBC BLD AUTO-RTO: 0 /100 WBCS
PH UR STRIP.AUTO: 6 [PH]
PLATELET # BLD AUTO: 43 THOUSANDS/UL (ref 149–390)
PLATELET BLD QL SMEAR: ABNORMAL
PMV BLD AUTO: 10.8 FL (ref 8.9–12.7)
POLYCHROMASIA BLD QL SMEAR: PRESENT
POTASSIUM SERPL-SCNC: 3.8 MMOL/L (ref 3.5–5.3)
PROT SERPL-MCNC: 7 G/DL (ref 6.4–8.2)
PROT UR STRIP-MCNC: ABNORMAL MG/DL
RBC # BLD AUTO: 5.44 MILLION/UL (ref 3.88–5.62)
RBC #/AREA URNS AUTO: ABNORMAL /HPF
SARS-COV-2 RNA RESP QL NAA+PROBE: NEGATIVE
SODIUM SERPL-SCNC: 134 MMOL/L (ref 136–145)
SP GR UR STRIP.AUTO: 1.04 (ref 1–1.03)
TOTAL CELLS COUNTED SPEC: 100
UROBILINOGEN UR QL STRIP.AUTO: 1 E.U./DL
WBC # BLD AUTO: 2.53 THOUSAND/UL (ref 4.31–10.16)
WBC #/AREA URNS AUTO: ABNORMAL /HPF

## 2021-07-23 PROCEDURE — 85007 BL SMEAR W/DIFF WBC COUNT: CPT

## 2021-07-23 PROCEDURE — 86618 LYME DISEASE ANTIBODY: CPT

## 2021-07-23 PROCEDURE — 80053 COMPREHEN METABOLIC PANEL: CPT

## 2021-07-23 PROCEDURE — 87040 BLOOD CULTURE FOR BACTERIA: CPT

## 2021-07-23 PROCEDURE — 99213 OFFICE O/P EST LOW 20 MIN: CPT | Performed by: INTERNAL MEDICINE

## 2021-07-23 PROCEDURE — 81001 URINALYSIS AUTO W/SCOPE: CPT | Performed by: INTERNAL MEDICINE

## 2021-07-23 PROCEDURE — 36415 COLL VENOUS BLD VENIPUNCTURE: CPT

## 2021-07-23 PROCEDURE — 85027 COMPLETE CBC AUTOMATED: CPT

## 2021-07-23 RX ORDER — DOXYCYCLINE HYCLATE 100 MG
100 TABLET ORAL 2 TIMES DAILY
Qty: 20 TABLET | Refills: 0 | Status: SHIPPED | OUTPATIENT
Start: 2021-07-23 | End: 2021-08-02

## 2021-07-23 NOTE — PROGRESS NOTES
Assessment/Plan:    Diagnoses and all orders for this visit:    Fever, unspecified fever cause  -     Blood culture; Future  -     CBC and differential; Future  -     Comprehensive metabolic panel; Future  -     UA w Reflex to Microscopic w Reflex to Culture  -     Lyme Total Antibody Profile with reflex to WB; Future  -     doxycycline hyclate (VIBRA-TABS) 100 mg tablet; Take 1 tablet (100 mg total) by mouth 2 (two) times a day for 10 days    Left ear pain  -     Ambulatory Referral to Otolaryngology; Future              Patient Instructions   Fever, headache and malaise concerning for Lyme disease the no tick exposure or rash  Will treat empirically with doxycycline and check lab work and follow closely  Subjective:      Patient ID: Calli Hou is a 72 y o  male    PATIENT PRESENTS ACUTELY WITH COMPLAINT overwhelming fatigue, generalized weakness and chills with low-grade fever  Symptoms started 72 hours ago after he was mowing his lawn  He was seen yesterday in urgent care where COVID test was negative and chest x-ray was negative  Last night was the worst with shaking chills  He denies any known tick exposure or rash  He is not having any cough or chest congestion  No ear aches  No dysuria or increased urinary frequency  No nausea, vomiting or diarrhea  He does have low-grade headache and neck stiffness  Notes chronic ear issues and wants to see ENT          Current Outpatient Medications:     aspirin 81 MG tablet, Take 1 tablet by mouth daily, Disp: , Rfl:     atorvastatin (LIPITOR) 20 mg tablet, TAKE 1 TABLET BY MOUTH EVERY DAY, Disp: 90 tablet, Rfl: 1    clopidogrel (PLAVIX) 75 mg tablet, TAKE 1 TABLET BY MOUTH EVERY DAY, Disp: 90 tablet, Rfl: 3    lisinopril (ZESTRIL) 20 mg tablet, Take 1 tablet (20 mg total) by mouth daily, Disp: 90 tablet, Rfl: 3    metoprolol succinate (TOPROL-XL) 50 mg 24 hr tablet, TAKE 1 TABLET BY MOUTH EVERY DAY, Disp: 90 tablet, Rfl: 3    nitroglycerin (NITROSTAT) 0 4 mg SL tablet, Place 1 tablet under the tongue as needed, Disp: , Rfl:     doxycycline hyclate (VIBRA-TABS) 100 mg tablet, Take 1 tablet (100 mg total) by mouth 2 (two) times a day for 10 days, Disp: 20 tablet, Rfl: 0    hydrochlorothiazide (HYDRODIURIL) 25 mg tablet, Take 1 tablet (25 mg total) by mouth daily (Patient not taking: Reported on 7/23/2021), Disp: 30 tablet, Rfl: 1    Recent Results (from the past 1008 hour(s))   CBC and differential    Collection Time: 07/13/21  7:09 AM   Result Value Ref Range    WBC 6 13 4 31 - 10 16 Thousand/uL    RBC 5 51 3 88 - 5 62 Million/uL    Hemoglobin 17 7 (H) 12 0 - 17 0 g/dL    Hematocrit 50 4 (H) 36 5 - 49 3 %    MCV 92 82 - 98 fL    MCH 32 1 26 8 - 34 3 pg    MCHC 35 1 31 4 - 37 4 g/dL    RDW 12 3 11 6 - 15 1 %    MPV 10 6 8 9 - 12 7 fL    Platelets 200 379 - 177 Thousands/uL    nRBC 0 /100 WBCs    Neutrophils Relative 43 43 - 75 %    Immat GRANS % 0 0 - 2 %    Lymphocytes Relative 34 14 - 44 %    Monocytes Relative 13 (H) 4 - 12 %    Eosinophils Relative 9 (H) 0 - 6 %    Basophils Relative 1 0 - 1 %    Neutrophils Absolute 2 67 1 85 - 7 62 Thousands/µL    Immature Grans Absolute 0 01 0 00 - 0 20 Thousand/uL    Lymphocytes Absolute 2 07 0 60 - 4 47 Thousands/µL    Monocytes Absolute 0 78 0 17 - 1 22 Thousand/µL    Eosinophils Absolute 0 52 0 00 - 0 61 Thousand/µL    Basophils Absolute 0 08 0 00 - 0 10 Thousands/µL   Comprehensive metabolic panel    Collection Time: 07/13/21  7:09 AM   Result Value Ref Range    Sodium 137 136 - 145 mmol/L    Potassium 4 3 3 5 - 5 3 mmol/L    Chloride 105 100 - 108 mmol/L    CO2 24 21 - 32 mmol/L    ANION GAP 8 4 - 13 mmol/L    BUN 17 5 - 25 mg/dL    Creatinine 0 95 0 60 - 1 30 mg/dL    Glucose, Fasting 90 65 - 99 mg/dL    Calcium 9 0 8 3 - 10 1 mg/dL    AST 28 5 - 45 U/L    ALT 51 12 - 78 U/L    Alkaline Phosphatase 64 46 - 116 U/L    Total Protein 7 3 6 4 - 8 2 g/dL    Albumin 3 6 3 5 - 5 0 g/dL    Total Bilirubin 1 37 (H) 0 20 - 1 00 mg/dL    eGFR 81 ml/min/1 73sq m   Lipid panel    Collection Time: 07/13/21  7:09 AM   Result Value Ref Range    Cholesterol 155 50 - 200 mg/dL    Triglycerides 118 <=150 mg/dL    HDL, Direct 45 >=40 mg/dL    LDL Calculated 86 0 - 100 mg/dL    Non-HDL-Chol (CHOL-HDL) 110 mg/dl   Hemoglobin A1C    Collection Time: 07/13/21  7:09 AM   Result Value Ref Range    Hemoglobin A1C 5 0 Normal 3 8-5 6%; PreDiabetic 5 7-6 4%; Diabetic >=6 5%; Glycemic control for adults with diabetes <7 0% %    EAG 97 mg/dl   TSH, 3rd generation with Free T4 reflex    Collection Time: 07/13/21  7:09 AM   Result Value Ref Range    TSH 3RD GENERATON 2 230 0 358 - 3 740 uIU/mL   Uric acid    Collection Time: 07/13/21  7:09 AM   Result Value Ref Range    Uric Acid 6 0 4 2 - 8 0 mg/dL   Magnesium    Collection Time: 07/13/21  7:09 AM   Result Value Ref Range    Magnesium 2 2 1 6 - 2 6 mg/dL   Novel Coronavirus (Covid-19),PCR [facility] - Office Collection    Collection Time: 07/22/21 10:36 AM    Specimen: Nose; Nares   Result Value Ref Range    SARS-CoV-2 Negative Negative       The following portions of the patient's history were reviewed and updated as appropriate: allergies, current medications, past family history, past medical history, past social history, past surgical history and problem list      Review of Systems   Constitutional: Positive for chills and fever  Negative for appetite change, diaphoresis, fatigue and unexpected weight change  HENT: Negative for congestion, hearing loss and rhinorrhea  Eyes: Negative for visual disturbance  Respiratory: Negative for cough, chest tightness, shortness of breath and wheezing  Cardiovascular: Negative for chest pain, palpitations and leg swelling  Gastrointestinal: Negative for abdominal pain and blood in stool  Endocrine: Negative for cold intolerance, heat intolerance, polydipsia and polyuria     Genitourinary: Negative for difficulty urinating, dysuria, frequency and urgency  Musculoskeletal: Negative for arthralgias and myalgias  Skin: Negative for rash  Neurological: Positive for weakness  Negative for dizziness, light-headedness and headaches  Hematological: Does not bruise/bleed easily  Psychiatric/Behavioral: Negative for dysphoric mood and sleep disturbance  Objective:      Vitals:    07/23/21 1019   BP: 128/74   Pulse: 88   Temp: 100 1 °F (37 8 °C)   SpO2: 97%          Physical Exam  Constitutional:       Appearance: He is well-developed  HENT:      Head: Normocephalic and atraumatic  Comments: Scarred R tm, L obscurred by cerumen     Nose: Nose normal    Eyes:      General: No scleral icterus  Conjunctiva/sclera: Conjunctivae normal       Pupils: Pupils are equal, round, and reactive to light  Neck:      Thyroid: No thyromegaly  Vascular: No JVD  Trachea: No tracheal deviation  Cardiovascular:      Rate and Rhythm: Normal rate and regular rhythm  Heart sounds: No murmur heard  No friction rub  No gallop  Pulmonary:      Effort: Pulmonary effort is normal  No respiratory distress  Breath sounds: Normal breath sounds  No wheezing or rales  Abdominal:      General: Bowel sounds are normal  There is no distension  Palpations: Abdomen is soft  There is no mass  Tenderness: There is no abdominal tenderness  There is no guarding or rebound  Musculoskeletal:         General: No tenderness or deformity  Cervical back: Normal range of motion and neck supple  Lymphadenopathy:      Cervical: No cervical adenopathy  Skin:     General: Skin is warm and dry  Coloration: Skin is not pale  Findings: No erythema or rash  Neurological:      Mental Status: He is alert and oriented to person, place, and time  Cranial Nerves: No cranial nerve deficit  Psychiatric:         Behavior: Behavior normal          Thought Content:  Thought content normal          Judgment: Judgment normal

## 2021-07-23 NOTE — PATIENT INSTRUCTIONS
Fever, headache and malaise concerning for Lyme disease the no tick exposure or rash  Will treat empirically with doxycycline and check lab work and follow closely

## 2021-07-24 LAB — B BURGDOR IGG+IGM SER-ACNC: 12

## 2021-07-24 NOTE — TELEPHONE ENCOUNTER
Lab Result: Critical Platelet: Result To Be Released To On Call Provider   Date/Time Drawn: 07/23/2021    Ordering Provider: Dr Esperanza Quan Name: Moshe Landaverde following critical/stat result was read back to the lab as stated above and [employer] to the on-call provider

## 2021-07-26 ENCOUNTER — APPOINTMENT (OUTPATIENT)
Dept: LAB | Facility: CLINIC | Age: 71
End: 2021-07-26
Payer: MEDICARE

## 2021-07-26 DIAGNOSIS — R50.9 FEVER, UNSPECIFIED FEVER CAUSE: ICD-10-CM

## 2021-07-26 PROCEDURE — 36415 COLL VENOUS BLD VENIPUNCTURE: CPT

## 2021-07-26 PROCEDURE — 85027 COMPLETE CBC AUTOMATED: CPT

## 2021-07-26 PROCEDURE — 85007 BL SMEAR W/DIFF WBC COUNT: CPT

## 2021-07-26 PROCEDURE — 85025 COMPLETE CBC W/AUTO DIFF WBC: CPT

## 2021-07-27 ENCOUNTER — TELEPHONE (OUTPATIENT)
Dept: INTERNAL MEDICINE CLINIC | Facility: CLINIC | Age: 71
End: 2021-07-27

## 2021-07-27 LAB
EOSINOPHIL NFR BLD MANUAL: 2 % (ref 0–6)
ERYTHROCYTE [DISTWIDTH] IN BLOOD BY AUTOMATED COUNT: 12.9 % (ref 11.6–15.1)
HCT VFR BLD AUTO: 44 % (ref 36.5–49.3)
HGB BLD-MCNC: 15.5 G/DL (ref 12–17)
LG PLATELETS BLD QL SMEAR: PRESENT
LYMPHOCYTES # BLD AUTO: 47 % (ref 14–44)
MCH RBC QN AUTO: 31.9 PG (ref 26.8–34.3)
MCHC RBC AUTO-ENTMCNC: 35.2 G/DL (ref 31.4–37.4)
MCV RBC AUTO: 91 FL (ref 82–98)
MONOCYTES NFR BLD: 2 % (ref 4–12)
NEUTS SEG NFR BLD AUTO: 43 % (ref 43–75)
NRBC BLD AUTO-RTO: 0 /100 WBCS
PATHOLOGIST INTERPRETATION: NORMAL
PATHOLOGY REVIEW: YES
PLATELET # BLD AUTO: 117 THOUSANDS/UL (ref 149–390)
PLATELET BLD QL SMEAR: ABNORMAL
PMV BLD AUTO: 11.3 FL (ref 8.9–12.7)
POIKILOCYTOSIS BLD QL SMEAR: PRESENT
RBC # BLD AUTO: 4.86 MILLION/UL (ref 3.88–5.62)
RBC MORPH BLD: PRESENT
TOTAL CELLS COUNTED SPEC: 100
VARIANT LYMPHS # BLD AUTO: 6 %
WBC # BLD AUTO: 6.38 THOUSAND/UL (ref 4.31–10.16)

## 2021-07-27 NOTE — TELEPHONE ENCOUNTER
----- Message from Natalie Isbell sent at 7/27/2021 12:05 PM EDT -----  His labs have improved drastically- how is he feeling? He can resume his plavix/asa   Recheck cbc again in 2 weeks to assure the platelets go back to complete normal

## 2021-07-28 LAB — BACTERIA BLD CULT: NORMAL

## 2021-10-06 ENCOUNTER — OFFICE VISIT (OUTPATIENT)
Dept: DERMATOLOGY | Facility: CLINIC | Age: 71
End: 2021-10-06
Payer: MEDICARE

## 2021-10-06 DIAGNOSIS — Z13.89 SCREENING FOR SKIN CONDITION: ICD-10-CM

## 2021-10-06 DIAGNOSIS — Z85.828 HISTORY OF SKIN CANCER: ICD-10-CM

## 2021-10-06 DIAGNOSIS — L82.1 SEBORRHEIC KERATOSIS: ICD-10-CM

## 2021-10-06 DIAGNOSIS — L57.0 ACTINIC KERATOSIS: Primary | ICD-10-CM

## 2021-10-06 PROCEDURE — 17003 DESTRUCT PREMALG LES 2-14: CPT | Performed by: DERMATOLOGY

## 2021-10-06 PROCEDURE — 99213 OFFICE O/P EST LOW 20 MIN: CPT | Performed by: DERMATOLOGY

## 2021-10-06 PROCEDURE — 17000 DESTRUCT PREMALG LESION: CPT | Performed by: DERMATOLOGY

## 2022-01-12 ENCOUNTER — APPOINTMENT (OUTPATIENT)
Dept: LAB | Facility: CLINIC | Age: 72
End: 2022-01-12
Payer: MEDICARE

## 2022-01-12 DIAGNOSIS — I10 HYPERTENSION, ESSENTIAL: ICD-10-CM

## 2022-01-12 DIAGNOSIS — Z12.5 SCREENING FOR PROSTATE CANCER: ICD-10-CM

## 2022-01-12 DIAGNOSIS — R73.01 IMPAIRED FASTING GLUCOSE: ICD-10-CM

## 2022-01-12 DIAGNOSIS — E78.2 COMBINED HYPERLIPIDEMIA: ICD-10-CM

## 2022-01-12 DIAGNOSIS — R50.9 FEVER, UNSPECIFIED FEVER CAUSE: ICD-10-CM

## 2022-01-12 LAB
ALBUMIN SERPL BCP-MCNC: 3.8 G/DL (ref 3.5–5)
ALP SERPL-CCNC: 64 U/L (ref 46–116)
ALT SERPL W P-5'-P-CCNC: 55 U/L (ref 12–78)
ANION GAP SERPL CALCULATED.3IONS-SCNC: 3 MMOL/L (ref 4–13)
AST SERPL W P-5'-P-CCNC: 29 U/L (ref 5–45)
BASOPHILS # BLD AUTO: 0.1 THOUSANDS/ΜL (ref 0–0.1)
BASOPHILS NFR BLD AUTO: 1 % (ref 0–1)
BILIRUB SERPL-MCNC: 1.32 MG/DL (ref 0.2–1)
BUN SERPL-MCNC: 15 MG/DL (ref 5–25)
CALCIUM SERPL-MCNC: 9.2 MG/DL (ref 8.3–10.1)
CHLORIDE SERPL-SCNC: 105 MMOL/L (ref 100–108)
CHOLEST SERPL-MCNC: 173 MG/DL
CO2 SERPL-SCNC: 29 MMOL/L (ref 21–32)
CREAT SERPL-MCNC: 1.02 MG/DL (ref 0.6–1.3)
EOSINOPHIL # BLD AUTO: 0.46 THOUSAND/ΜL (ref 0–0.61)
EOSINOPHIL NFR BLD AUTO: 7 % (ref 0–6)
ERYTHROCYTE [DISTWIDTH] IN BLOOD BY AUTOMATED COUNT: 12.1 % (ref 11.6–15.1)
EST. AVERAGE GLUCOSE BLD GHB EST-MCNC: 103 MG/DL
GFR SERPL CREATININE-BSD FRML MDRD: 74 ML/MIN/1.73SQ M
GLUCOSE P FAST SERPL-MCNC: 97 MG/DL (ref 65–99)
HBA1C MFR BLD: 5.2 %
HCT VFR BLD AUTO: 50 % (ref 36.5–49.3)
HDLC SERPL-MCNC: 43 MG/DL
HGB BLD-MCNC: 17.9 G/DL (ref 12–17)
IMM GRANULOCYTES # BLD AUTO: 0.02 THOUSAND/UL (ref 0–0.2)
IMM GRANULOCYTES NFR BLD AUTO: 0 % (ref 0–2)
LDLC SERPL CALC-MCNC: 106 MG/DL (ref 0–100)
LYMPHOCYTES # BLD AUTO: 2.4 THOUSANDS/ΜL (ref 0.6–4.47)
LYMPHOCYTES NFR BLD AUTO: 35 % (ref 14–44)
MCH RBC QN AUTO: 31.6 PG (ref 26.8–34.3)
MCHC RBC AUTO-ENTMCNC: 35.8 G/DL (ref 31.4–37.4)
MCV RBC AUTO: 88 FL (ref 82–98)
MONOCYTES # BLD AUTO: 0.81 THOUSAND/ΜL (ref 0.17–1.22)
MONOCYTES NFR BLD AUTO: 12 % (ref 4–12)
NEUTROPHILS # BLD AUTO: 3.13 THOUSANDS/ΜL (ref 1.85–7.62)
NEUTS SEG NFR BLD AUTO: 45 % (ref 43–75)
NONHDLC SERPL-MCNC: 130 MG/DL
NRBC BLD AUTO-RTO: 0 /100 WBCS
PLATELET # BLD AUTO: 174 THOUSANDS/UL (ref 149–390)
PMV BLD AUTO: 9.7 FL (ref 8.9–12.7)
POTASSIUM SERPL-SCNC: 4.6 MMOL/L (ref 3.5–5.3)
PROT SERPL-MCNC: 7.2 G/DL (ref 6.4–8.2)
PSA SERPL-MCNC: 0.7 NG/ML (ref 0–4)
RBC # BLD AUTO: 5.66 MILLION/UL (ref 3.88–5.62)
SODIUM SERPL-SCNC: 137 MMOL/L (ref 136–145)
TRIGL SERPL-MCNC: 120 MG/DL
TSH SERPL DL<=0.05 MIU/L-ACNC: 1.88 UIU/ML (ref 0.36–3.74)
WBC # BLD AUTO: 6.92 THOUSAND/UL (ref 4.31–10.16)

## 2022-01-12 PROCEDURE — 36415 COLL VENOUS BLD VENIPUNCTURE: CPT

## 2022-01-12 PROCEDURE — 84443 ASSAY THYROID STIM HORMONE: CPT

## 2022-01-12 PROCEDURE — 85025 COMPLETE CBC W/AUTO DIFF WBC: CPT

## 2022-01-12 PROCEDURE — 80061 LIPID PANEL: CPT

## 2022-01-12 PROCEDURE — 80053 COMPREHEN METABOLIC PANEL: CPT

## 2022-01-12 PROCEDURE — G0103 PSA SCREENING: HCPCS

## 2022-01-12 PROCEDURE — 83036 HEMOGLOBIN GLYCOSYLATED A1C: CPT

## 2022-01-19 ENCOUNTER — OFFICE VISIT (OUTPATIENT)
Dept: INTERNAL MEDICINE CLINIC | Facility: CLINIC | Age: 72
End: 2022-01-19
Payer: MEDICARE

## 2022-01-19 VITALS
SYSTOLIC BLOOD PRESSURE: 128 MMHG | BODY MASS INDEX: 33.18 KG/M2 | WEIGHT: 231.8 LBS | HEART RATE: 74 BPM | HEIGHT: 70 IN | DIASTOLIC BLOOD PRESSURE: 84 MMHG | RESPIRATION RATE: 18 BRPM

## 2022-01-19 DIAGNOSIS — I10 HYPERTENSION, ESSENTIAL: ICD-10-CM

## 2022-01-19 DIAGNOSIS — I25.10 CORONARY ARTERY DISEASE INVOLVING NATIVE CORONARY ARTERY OF NATIVE HEART WITHOUT ANGINA PECTORIS: ICD-10-CM

## 2022-01-19 DIAGNOSIS — E78.2 COMBINED HYPERLIPIDEMIA: ICD-10-CM

## 2022-01-19 DIAGNOSIS — L30.9 DERMATITIS: ICD-10-CM

## 2022-01-19 DIAGNOSIS — G47.33 OSA (OBSTRUCTIVE SLEEP APNEA): ICD-10-CM

## 2022-01-19 DIAGNOSIS — R73.01 IMPAIRED FASTING GLUCOSE: Primary | ICD-10-CM

## 2022-01-19 PROCEDURE — 99215 OFFICE O/P EST HI 40 MIN: CPT | Performed by: INTERNAL MEDICINE

## 2022-01-19 RX ORDER — TRIAMCINOLONE ACETONIDE 5 MG/G
OINTMENT TOPICAL 2 TIMES DAILY
Qty: 30 G | Refills: 0 | Status: SHIPPED | OUTPATIENT
Start: 2022-01-19 | End: 2022-07-27

## 2022-01-19 NOTE — PROGRESS NOTES
Assessment/Plan:    Diagnoses and all orders for this visit:    Impaired fasting glucose  -     Hemoglobin A1C; Future    ERIC (obstructive sleep apnea)    Coronary artery disease involving native coronary artery of native heart without angina pectoris    Hypertension, essential  -     CBC and differential; Future  -     Comprehensive metabolic panel; Future    Combined hyperlipidemia  -     Lipid panel; Future    Dermatitis  -     triamcinolone (KENALOG) 0 5 % ointment; Apply topically 2 (two) times a day              Patient Instructions   Lab data reviewed in detail and compared prior    Coronary artery disease stable without angina, continue aggressive risk factor modification    Hyperlipidemia-, increase atorvastatin by 10 mg each week, hold highest tolerated dose if muscle pains recur    Hypertension stable on present regimen, continue home blood pressure monitoring and contact me if ranging greater than 140/90  Scaly lesion on right clavicle-treat with triamcinolone ointment twice daily, if this does not improve over the next 1-2 weeks consider seeing Dermatology  Sleep apnea-intolerant of CPAP, monitor for daytime somnolence or a m  Headaches  Polycythemia-keep well-hydrated, this could be related to untreated sleep apnea  Continue to monitor  Routine follow-up after labs in 6 months, sooner as needed      Subjective:      Patient ID: Maged Haque is a 80 y o  male    F/u mmp and review labs  Feeling well  Vacc'd and boosted  BPH-still w/ frequency and urgency, 1-2 x nocturia  Empties completely   Exercising regularly w/ walks, and dumbbells  but hasn't been back to gym d/t covid  CAD-following w/ cardiology  No angina  HTN/HPL-taking rx as directed, home bp's 120-130's/70's, he hasn't had hctz in quite some time  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, didn't tolerate cpap d/t noise and discomfort  Occasional snoring, but no eds or am headache  Polycythemia-admits not enough water  Current Outpatient Medications:     aspirin 81 MG tablet, Take 1 tablet by mouth daily, Disp: , Rfl:     atorvastatin (LIPITOR) 20 mg tablet, TAKE 1 TABLET BY MOUTH EVERY DAY, Disp: 90 tablet, Rfl: 1    clopidogrel (PLAVIX) 75 mg tablet, TAKE 1 TABLET BY MOUTH EVERY DAY, Disp: 90 tablet, Rfl: 3    lisinopril (ZESTRIL) 20 mg tablet, Take 1 tablet (20 mg total) by mouth daily, Disp: 90 tablet, Rfl: 3    metoprolol succinate (TOPROL-XL) 50 mg 24 hr tablet, TAKE 1 TABLET BY MOUTH EVERY DAY, Disp: 90 tablet, Rfl: 3    nitroglycerin (NITROSTAT) 0 4 mg SL tablet, Place 1 tablet under the tongue as needed, Disp: , Rfl:     hydrochlorothiazide (HYDRODIURIL) 25 mg tablet, Take 1 tablet (25 mg total) by mouth daily (Patient not taking: Reported on 7/23/2021), Disp: 30 tablet, Rfl: 1    triamcinolone (KENALOG) 0 5 % ointment, Apply topically 2 (two) times a day, Disp: 30 g, Rfl: 0    Recent Results (from the past 1008 hour(s))   CBC and differential    Collection Time: 01/12/22  7:37 AM   Result Value Ref Range    WBC 6 92 4 31 - 10 16 Thousand/uL    RBC 5 66 (H) 3 88 - 5 62 Million/uL    Hemoglobin 17 9 (H) 12 0 - 17 0 g/dL    Hematocrit 50 0 (H) 36 5 - 49 3 %    MCV 88 82 - 98 fL    MCH 31 6 26 8 - 34 3 pg    MCHC 35 8 31 4 - 37 4 g/dL    RDW 12 1 11 6 - 15 1 %    MPV 9 7 8 9 - 12 7 fL    Platelets 503 610 - 424 Thousands/uL    nRBC 0 /100 WBCs    Neutrophils Relative 45 43 - 75 %    Immat GRANS % 0 0 - 2 %    Lymphocytes Relative 35 14 - 44 %    Monocytes Relative 12 4 - 12 %    Eosinophils Relative 7 (H) 0 - 6 %    Basophils Relative 1 0 - 1 %    Neutrophils Absolute 3 13 1 85 - 7 62 Thousands/µL    Immature Grans Absolute 0 02 0 00 - 0 20 Thousand/uL    Lymphocytes Absolute 2 40 0 60 - 4 47 Thousands/µL    Monocytes Absolute 0 81 0 17 - 1 22 Thousand/µL    Eosinophils Absolute 0 46 0 00 - 0 61 Thousand/µL    Basophils Absolute 0 10 0 00 - 0 10 Thousands/µL   Comprehensive metabolic panel    Collection Time: 01/12/22  7:37 AM   Result Value Ref Range    Sodium 137 136 - 145 mmol/L    Potassium 4 6 3 5 - 5 3 mmol/L    Chloride 105 100 - 108 mmol/L    CO2 29 21 - 32 mmol/L    ANION GAP 3 (L) 4 - 13 mmol/L    BUN 15 5 - 25 mg/dL    Creatinine 1 02 0 60 - 1 30 mg/dL    Glucose, Fasting 97 65 - 99 mg/dL    Calcium 9 2 8 3 - 10 1 mg/dL    AST 29 5 - 45 U/L    ALT 55 12 - 78 U/L    Alkaline Phosphatase 64 46 - 116 U/L    Total Protein 7 2 6 4 - 8 2 g/dL    Albumin 3 8 3 5 - 5 0 g/dL    Total Bilirubin 1 32 (H) 0 20 - 1 00 mg/dL    eGFR 74 ml/min/1 73sq m   Lipid panel    Collection Time: 01/12/22  7:37 AM   Result Value Ref Range    Cholesterol 173 See Comment mg/dL    Triglycerides 120 See Comment mg/dL    HDL, Direct 43 >=40 mg/dL    LDL Calculated 106 (H) 0 - 100 mg/dL    Non-HDL-Chol (CHOL-HDL) 130 mg/dl   Hemoglobin A1C    Collection Time: 01/12/22  7:37 AM   Result Value Ref Range    Hemoglobin A1C 5 2 Normal 3 8-5 6%; PreDiabetic 5 7-6 4%; Diabetic >=6 5%; Glycemic control for adults with diabetes <7 0% %     mg/dl   TSH, 3rd generation with Free T4 reflex    Collection Time: 01/12/22  7:37 AM   Result Value Ref Range    TSH 3RD GENERATON 1 880 0 358 - 3 740 uIU/mL   PSA, Total Screen    Collection Time: 01/12/22  7:37 AM   Result Value Ref Range    PSA 0 7 0 0 - 4 0 ng/mL       The following portions of the patient's history were reviewed and updated as appropriate: allergies, current medications, past family history, past medical history, past social history, past surgical history and problem list      Review of Systems   Constitutional: Negative for appetite change, chills, diaphoresis, fatigue, fever and unexpected weight change  HENT: Negative for congestion, hearing loss and rhinorrhea  Eyes: Negative for visual disturbance  Respiratory: Negative for cough, chest tightness, shortness of breath and wheezing  Cardiovascular: Negative for chest pain, palpitations and leg swelling     Gastrointestinal: Negative for abdominal pain and blood in stool  Endocrine: Negative for cold intolerance, heat intolerance, polydipsia and polyuria  Genitourinary: Negative for difficulty urinating, dysuria, frequency and urgency  Musculoskeletal: Negative for arthralgias and myalgias  Skin: Negative for rash  Neurological: Negative for dizziness, weakness, light-headedness and headaches  Hematological: Does not bruise/bleed easily  Psychiatric/Behavioral: Negative for dysphoric mood and sleep disturbance  Objective:      Vitals:    01/19/22 1537   BP: 128/84   Pulse: 74   Resp: 18          Physical Exam  Constitutional:       Appearance: He is well-developed  HENT:      Head: Normocephalic and atraumatic  Nose: Nose normal    Eyes:      General: No scleral icterus  Conjunctiva/sclera: Conjunctivae normal       Pupils: Pupils are equal, round, and reactive to light  Neck:      Thyroid: No thyromegaly  Vascular: No JVD  Trachea: No tracheal deviation  Cardiovascular:      Rate and Rhythm: Normal rate and regular rhythm  Heart sounds: No murmur heard  No friction rub  No gallop  Pulmonary:      Effort: Pulmonary effort is normal  No respiratory distress  Breath sounds: Normal breath sounds  No wheezing or rales  Musculoskeletal:         General: No deformity  Cervical back: Normal range of motion and neck supple  Lymphadenopathy:      Cervical: No cervical adenopathy  Skin:     General: Skin is warm and dry  Coloration: Skin is not pale  Findings: No erythema or rash  Comments: Scaly erythematous patch over R mid clavical   Neurological:      Mental Status: He is alert and oriented to person, place, and time  Cranial Nerves: No cranial nerve deficit  Psychiatric:         Behavior: Behavior normal          Thought Content:  Thought content normal          Judgment: Judgment normal

## 2022-01-19 NOTE — PATIENT INSTRUCTIONS
Lab data reviewed in detail and compared prior    Coronary artery disease stable without angina, continue aggressive risk factor modification    Hyperlipidemia-, increase atorvastatin by 10 mg each week, hold highest tolerated dose if muscle pains recur    Hypertension stable on present regimen, continue home blood pressure monitoring and contact me if ranging greater than 140/90  Scaly lesion on right clavicle-treat with triamcinolone ointment twice daily, if this does not improve over the next 1-2 weeks consider seeing Dermatology  Sleep apnea-intolerant of CPAP, monitor for daytime somnolence or a m  Headaches  Polycythemia-keep well-hydrated, this could be related to untreated sleep apnea  Continue to monitor      Routine follow-up after labs in 6 months, sooner as needed

## 2022-01-27 DIAGNOSIS — I10 ESSENTIAL HYPERTENSION: ICD-10-CM

## 2022-01-27 RX ORDER — LISINOPRIL 20 MG/1
TABLET ORAL
Qty: 90 TABLET | Refills: 3 | Status: SHIPPED | OUTPATIENT
Start: 2022-01-27

## 2022-04-08 DIAGNOSIS — E78.2 COMBINED HYPERLIPIDEMIA: ICD-10-CM

## 2022-04-08 RX ORDER — ATORVASTATIN CALCIUM 20 MG/1
TABLET, FILM COATED ORAL
Qty: 90 TABLET | Refills: 1 | Status: SHIPPED | OUTPATIENT
Start: 2022-04-08

## 2022-07-04 DIAGNOSIS — I25.10 CORONARY ARTERY DISEASE: ICD-10-CM

## 2022-07-05 RX ORDER — CLOPIDOGREL BISULFATE 75 MG/1
TABLET ORAL
Qty: 90 TABLET | Refills: 3 | Status: SHIPPED | OUTPATIENT
Start: 2022-07-05

## 2022-07-07 DIAGNOSIS — I10 BENIGN ESSENTIAL HTN: ICD-10-CM

## 2022-07-07 RX ORDER — METOPROLOL SUCCINATE 50 MG/1
TABLET, EXTENDED RELEASE ORAL
Qty: 90 TABLET | Refills: 3 | Status: SHIPPED | OUTPATIENT
Start: 2022-07-07

## 2022-07-27 DIAGNOSIS — L30.9 DERMATITIS: ICD-10-CM

## 2022-07-27 RX ORDER — TRIAMCINOLONE ACETONIDE 5 MG/G
OINTMENT TOPICAL
Qty: 30 G | Refills: 0 | Status: SHIPPED | OUTPATIENT
Start: 2022-07-27

## 2022-09-26 NOTE — TELEPHONE ENCOUNTER
Talked to patient  Patient has no symptoms referable to cardiovascular system  Patient does have abnormal stress echocardiogram   Patient will be scheduled for an exercise nuclear stress test end of  March to clarify this abnormality  Patient understands the same  Erythromycin Pregnancy And Lactation Text: This medication is Pregnancy Category B and is considered safe during pregnancy. It is also excreted in breast milk.

## 2022-10-19 ENCOUNTER — OFFICE VISIT (OUTPATIENT)
Dept: DERMATOLOGY | Facility: CLINIC | Age: 72
End: 2022-10-19

## 2022-10-19 VITALS — HEIGHT: 70 IN | BODY MASS INDEX: 32.21 KG/M2 | WEIGHT: 225 LBS

## 2022-10-19 DIAGNOSIS — L82.1 SEBORRHEIC KERATOSIS: ICD-10-CM

## 2022-10-19 DIAGNOSIS — Z85.828 HISTORY OF SKIN CANCER: ICD-10-CM

## 2022-10-19 DIAGNOSIS — L98.9 UNKNOWN SKIN LESION: Primary | ICD-10-CM

## 2022-10-19 DIAGNOSIS — Z13.89 SCREENING FOR SKIN CONDITION: ICD-10-CM

## 2022-10-19 DIAGNOSIS — L57.0 ACTINIC KERATOSIS: ICD-10-CM

## 2022-10-19 NOTE — PATIENT INSTRUCTIONS
Skin lesion possible basal cell carcinoma some some may be amenable to curettage lesion on the arm may need to be excised  Actinic Keratosis:  Patient advised lesions are precancers  These should resolve with cryosurgery if not to let us know sun block recommended  Seborrheic keratosis patient reassured these are normal growths we acquire with age no treatment needed  History of skin cancer in no recurrence nothing else atypical sunblock recommended follow-up in 1 year  Screening for dermatologic disorders nothing else of concern noted on complete exam follow-up in 1 year  Wound care instructions given to patient  Dr Ever Montez Biopsy After Care Instructions      Remove bandage the next day  Keep bandage dry  Shower or Bathe as usual the next day  Cleanse the area twice daily with saline or hydrogen peroxide  Apply Vaseline  WE ADVISE YOU NOT TO USE NEOSPORIN OR ANY TOPICAL ANTIBIOTIC UNLESS INSTRUCTED BY THE DOCTOR  Cover area with a dressing or Band-Aid if possible  Continue treatment until completely healed  (Skin appears in pink)  Try to avoid scab formation  Slight bleeding may occur after the Band-Aid/Dressing is removed or the first few days after the procedure was done  Don't panic!! Apply continuous, direct pressure on the dressing over the wound for 15-20 minutes  DO NOT remove dressing  HINT:  Set a timer for 15-20 minutes to make sure you press on the wound long enough  SOAKING: the dressing before you remove it should decrease chances of bleeding  If the wound is on the legs or arms, swelling may occur  Elevating the arm or leg above the level of the heart as much as possible will also decrease swelling, promote healing and decrease chances of bleeding  ANY QUESTION PLEASE CALL OUR OFFICE  [id number]  IF AFTER HOURS, THE ANSWERING SERVICE WILL GET A HOLD OF THE DOCTOR  PLEASE BE ADVISED THAT BIOPSY RESULTS CAN TAKE UP TO 1 TO 2 WEEKS   [facility], BUT PLEASE WAIT FOR THE DOCTOR OR STAFF TO NOTIFY YOU  Adriana Chavez!! Treatment with Cryotherapy    The doctor has treated your skin with nitrogen, which is 320 degrees Fahrenheit below zero  He has given the treated area "frostbite "    Stinging should subside within a few hours  You can take Tylenol for pain, if needed  Over the next few days, it is normal if the area becomes reddened, a blood blister, or swollen with fluid  If the lesion treated was near the eye - you could get a swollen eye over the next few days  Do not panic! This is all temporary, and will resolve with time  There is no special treatment - just keep the area clean  Makeup and BandAids can be used, if preferred  When the area starts to dry up and peel off, using Vaseline can help healing  It usually takes up to a month for it to heal   Some lesions are recurrent and may require repeat treatments  If a lesion has not healed in one month, please don't hesitate to contact us  If you have any further questions that are not answered here, please call us  [phone]    Thank you for allowing us to care for you

## 2022-10-19 NOTE — PROGRESS NOTES
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, congenital    • Nephrotic syndrome    • Nonmelanoma skin cancer     last assessed 18   • Old myocardial infarction    • Skin cancer     last assessed 14   • 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      Past Surgical History:   Procedure Laterality Date   • ATHERECTOMY      1 with stent placement  last assessed 14   • CARDIAC CATHETERIZATION      outcome: successful  last assessed 14   • CORONARY ANGIOPLASTY     • CORONARY ANGIOPLASTY WITH STENT PLACEMENT      to LAD, diagonal and RCA   • MALIGNANT SKIN LESION EXCISION  2012    Trunk,  SCC chest      Social History   Social History     Substance and Sexual Activity   Alcohol Use Yes   • Alcohol/week: 2 0 standard drinks   • Types: 2 Shots of liquor per week    Comment: Wine consumption (__glasses/day)      Social History     Substance and Sexual Activity   Drug Use No     Social History     Tobacco Use   Smoking Status Never Smoker   Smokeless Tobacco Never Used     Family History   Problem Relation Age of Onset   • Heart attack Father 50   • Cancer Father    • Coronary artery disease Family      Meds/Allergies   No Known Allergies    Meds:  Prior to Admission medications    Medication Sig Start Date End Date Taking?  Authorizing Provider   aspirin 81 MG tablet Take 1 tablet by mouth daily   Yes Historical Provider, MD   atorvastatin (LIPITOR) 20 mg tablet TAKE 1 TABLET BY MOUTH EVERY DAY 4/8/22  Yes Amanda Shabazz MD   clopidogrel (PLAVIX) 75 mg tablet TAKE 1 TABLET BY MOUTH EVERY DAY 7/5/22  Yes Melodie Prado MD   lisinopril (ZESTRIL) 20 mg tablet TAKE 1 TABLET BY MOUTH EVERY DAY 1/27/22  Yes Rome Nichole MD   metoprolol succinate (TOPROL-XL) 50 mg 24 hr tablet TAKE 1 TABLET BY MOUTH EVERY DAY 7/7/22  Yes Amanda Shabazz MD   nitroglycerin (NITROSTAT) 0 4 mg SL tablet Place 1 tablet under the tongue as needed 5/15/14  Yes Historical Provider, MD   triamcinolone (KENALOG) 0 5 % ointment APPLY TO AFFECTED AREA TWICE A DAY 7/27/22  Yes Rome Nichole MD   hydrochlorothiazide (HYDRODIURIL) 25 mg tablet Take 1 tablet (25 mg total) by mouth daily  Patient not taking: No sig reported 2/23/21   Jatin Patino MD       Subjective:     Review of Systems:    General: negative for - chills, fatigue, fever,  weight gain or weight loss  Psychological: negative for - anxiety, behavioral disorder, concentration difficulties, decreased libido, depression, irritability, memory difficulties, mood swings, sleep disturbances or suicidal ideation  ENT: negative for - hearing difficulties , nasal congestion, nasal discharge, oral lesions, sinus pain, sneezing, sore throat  Allergy and Immunology: negative for - hives, insect bite sensitivity,  Hematological and Lymphatic: negative for - bleeding problems, blood clots,bruising, swollen lymph nodes  Endocrine: negative for - hair pattern changes, hot flashes, malaise/lethargy, mood swings, palpitations, polydipsia/polyuria, skin changes, temperature intolerance or unexpected weight change  Respiratory: negative for - cough, hemoptysis, orthopnea, shortness of breath, or wheezing  Cardiovascular: negative for - chest pain, dyspnea on exertion, edema,  Gastrointestinal: negative for - abdominal pain, nausea/vomiting  Genito-Urinary: negative for - dysuria, incontinence, irregular/heavy menses or urinary frequency/urgency  Musculoskeletal: negative for - gait disturbance, joint pain, joint stiffness, joint swelling, muscle pain, muscular weakness  Dermatological:  As in HPI  Neurological: negative for confusion, dizziness, headaches, impaired coordination/balance, memory loss, numbness/tingling, seizures, speech problems, tremors or weakness       Objective:   Ht 5' 10" (1 778 m)   Wt 102 kg (225 lb)   BMI 32 28 kg/m²     Physical Exam:    General Appearance:    Alert, cooperative, no distress   Head:    Normocephalic, without obvious abnormality, atraumatic           Skin:   A full skin exam was performed including scalp, head scalp, eyes, ears, nose, lips, neck, chest, axilla, abdomen, back, buttocks, bilateral upper extremities, bilateral lower extremities, hands, feet, fingers, toes, fingernails, and toenails scaling erythematous areas noted back x 7 mm on the left upper back 3 mm and midback and 6 mm keratotic area noted on left forearm scaling erythematous areas noted on the face normal keratotic papules greasy stuck on appearance previous sites skin cancer well healed without recurrence nothing else atypical noted on complete exam              Physical Exam  HENT:      Head:          Shave Biopsy Procedure Note    Pre-operative Diagnosis:  Rule out basal cell carcinoma    Plan:  1  Instructed to keep the wound dry and covered for 24 and clean thereafter  2  Warning signs of infection were reviewed  3  Recommended that the patient use OTC acetaminophen as needed for pain  4  Return  Pending results of biopsy(ies)    Locations:  Midback left back and left forearm    Indications:  Suspicious lesion    Anesthesia: Lidocaine 1% with epinephrine with added sodium bicarbonate    Procedure Details     Patient informed of the risks (including bleeding and infection) and benefits of the   procedure and Verbal informed consent obtained      The lesion and surrounding area were given a sterile prep using alcohol and draped in the usual sterile fashion  A Blue blade razor was used to obtain a specimen  Hemostasis achieved with aluminum chloride  Petrolatum and a sterile dressing applied  The specimen was sent for pathologic examination  The patient tolerated the procedure(s) well  Complications:  None  Cryotherapy Procedure Note    Pre-operative Diagnosis: actinic keratosis    Plan:  1  Instructed to keep the area dry and clean thereafter  Apply petrolatum if area gets crusty  2  Recommended that the patient use acetaminophen  as needed for pain  Locations:  Face    Indications: Destruction of actinic keratoses x3    Patient informed of risks (permanent scarring, infection, light or dark discoloration, bleeding, infection, weakness, numbness and recurrence of the lesion) and benefits of the procedure and verbal informed consent obtained  The areas are treated with liquid nitrogen therapy, frozen until ice ball extended 2 mm beyond lesion, allowed to thaw, and treated again  The patient tolerated procedure well  The patient was instructed on post-op care, warned that there may be blister formation, redness and pain  Recommend OTC analgesia as needed for pain  Condition:  Stable    Complications:  none  Assessment:     1  Unknown skin lesion     2  Actinic keratosis     3  Seborrheic keratosis     4  Screening for skin condition     5  History of skin cancer           Plan:   Skin lesion possible basal cell carcinoma some some may be amenable to curettage lesion on the arm may need to be excised  Actinic Keratosis:  Patient advised lesions are precancers  These should resolve with cryosurgery if not to let us know sun block recommended    Seborrheic keratosis patient reassured these are normal growths we acquire with age no treatment needed  History of skin cancer in no recurrence nothing else atypical sunblock recommended follow-up in 1 year  Screening for dermatologic disorders nothing else of concern noted on complete exam follow-up in 1 year    Jeanette Shannon  10/19/2022,4:03 PM    Portions of the record may have been created with voice recognition software   Occasional wrong word or "sound a like" substitutions may have occurred due to the inherent limitations of voice recognition software   Read the chart carefully and recognize, using context, where substitutions have occurred

## 2022-10-28 DIAGNOSIS — E78.2 COMBINED HYPERLIPIDEMIA: ICD-10-CM

## 2022-10-28 RX ORDER — ATORVASTATIN CALCIUM 20 MG/1
TABLET, FILM COATED ORAL
Qty: 90 TABLET | Refills: 1 | Status: SHIPPED | OUTPATIENT
Start: 2022-10-28

## 2022-11-02 ENCOUNTER — OFFICE VISIT (OUTPATIENT)
Dept: DERMATOLOGY | Facility: CLINIC | Age: 72
End: 2022-11-02

## 2022-11-02 VITALS — BODY MASS INDEX: 32.21 KG/M2 | WEIGHT: 225 LBS | HEIGHT: 70 IN

## 2022-11-02 DIAGNOSIS — L98.9 UNKNOWN SKIN LESION: Primary | ICD-10-CM

## 2022-11-02 NOTE — PROGRESS NOTES
[address]  [address] [facility] [address]  [phone]  [phone]     MRN: [id number] : 1950  Encounter: [id number]  Patient Information: Tameka Decker    Subjective:     81-year-old male presents for previous biopsy on the back patient is complaining of discomfort in the area and his wife was concerned that it was in fact     Objective:   Ht 5' 10" (1 778 m)   Wt 102 kg (225 lb)   BMI 32 28 kg/m²     Physical Exam:    General Appearance:    Alert, cooperative, no distress   Skin:   Previous site of biopsy appears without sign of any infection  normal exudate of wound     Assessment:     1  Unknown skin lesion           Plan:   Patient to continue same treatment will plan follow-up again we have the results of biopsy       Prior to Admission medications    Medication Sig Start Date End Date Taking?  Authorizing Provider   aspirin 81 MG tablet Take 1 tablet by mouth daily    Historical Provider, MD   atorvastatin (LIPITOR) 20 mg tablet TAKE 1 TABLET BY MOUTH EVERY DAY 10/28/22   Melodie Prado MD   clopidogrel (PLAVIX) 75 mg tablet TAKE 1 TABLET BY MOUTH EVERY DAY 22   Ashley Chan MD   hydrochlorothiazide (HYDRODIURIL) 25 mg tablet Take 1 tablet (25 mg total) by mouth daily  Patient not taking: No sig reported 21   Madhavi Hennesys MD   lisinopril (ZESTRIL) 20 mg tablet TAKE 1 TABLET BY MOUTH EVERY DAY 22   Kasie Sanchez MD   metoprolol succinate (TOPROL-XL) 50 mg 24 hr tablet TAKE 1 TABLET BY MOUTH EVERY DAY 22   [facility] MD Rocio   nitroglycerin (NITROSTAT) 0 4 mg SL tablet Place 1 tablet under the tongue as needed 5/15/14   Historical Provider, MD   triamcinolone (KENALOG) 0 5 % ointment APPLY TO AFFECTED AREA TWICE A DAY 22   Kasie Sanchez MD     No Known Allergies    Jadyn Montoya  2022,11:34 AM    Portions of the record may have been created with voice recognition software   Occasional wrong word or "sound a like" substitutions may have occurred due to the inherent limitations of voice recognition software   Read the chart carefully and recognize, using context, where substitutions have occurred

## 2022-11-10 ENCOUNTER — OFFICE VISIT (OUTPATIENT)
Dept: CARDIOLOGY CLINIC | Facility: CLINIC | Age: 72
End: 2022-11-10

## 2022-11-10 VITALS
SYSTOLIC BLOOD PRESSURE: 124 MMHG | OXYGEN SATURATION: 98 % | HEIGHT: 70 IN | DIASTOLIC BLOOD PRESSURE: 80 MMHG | RESPIRATION RATE: 16 BRPM | HEART RATE: 64 BPM | WEIGHT: 226 LBS | BODY MASS INDEX: 32.35 KG/M2

## 2022-11-10 DIAGNOSIS — I10 BENIGN ESSENTIAL HTN: ICD-10-CM

## 2022-11-10 DIAGNOSIS — E78.2 COMBINED HYPERLIPIDEMIA: ICD-10-CM

## 2022-11-10 DIAGNOSIS — I25.10 CORONARY ARTERY DISEASE INVOLVING NATIVE CORONARY ARTERY OF NATIVE HEART WITHOUT ANGINA PECTORIS: Primary | ICD-10-CM

## 2022-11-10 NOTE — PROGRESS NOTES
[address]   [facility] [place] [address]  Cardiology Follow Up    Amber Chaudhari  1950  [id number]      1  Coronary artery disease involving native coronary artery of native heart without angina pectoris     2  Benign essential HTN     3  Combined hyperlipidemia         Chief Complaint   Patient presents with   • Follow-up       Interval History:  Patient presents for follow-up visit  Patient denies any history of chest pain shortness of breath  Patient denies any history of leg edema or orthopnea PND  No history of presyncope syncope  Patient states compliance with the present list of medications  Patient did have some excessive bruising  Patient also had severe thrombocytopenia last year  Patient is on dual anti-platelet therapy      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, congenital    • Nephrotic syndrome    • Nonmelanoma skin cancer     last assessed 1/11/18   • Old myocardial infarction    • Skin cancer     last assessed 1/13/14   • 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      Social History     Socioeconomic History   • Marital status: /Civil Union     Spouse name: Not on file   • Number of children: Not on file   • Years of education: Not on file   • Highest education level: Not on file   Occupational History   • Occupation: retired [employer] teacher    Tobacco Use   • Smoking status: Never Smoker   • Smokeless tobacco: Never Used   Vaping Use   • Vaping Use: Never used   Substance and Sexual Activity   • Alcohol use:  Yes     Alcohol/week: 2 0 standard drinks     Types: 2 Shots of liquor per week     Comment: Wine consumption (__glasses/day)    • Drug use: No   • Sexual activity: Not Currently   Other Topics Concern   • Not on file   Social History Narrative    DME: CPAP    Living independently with spouse     No advance directives     Social Determinants of Health     Financial Resource Strain: Not on file   Food Insecurity: Not on file   Transportation Needs: Not on file   Physical Activity: Not on file   Stress: Not on file   Social Connections: Not on file   Intimate Partner Violence: Not on file   Housing Stability: Not on file      Family History   Problem Relation Age of Onset   • Heart attack Father 50   • Cancer Father    • Coronary artery disease Family      Past Surgical History:   Procedure Laterality Date   • ATHERECTOMY      1 with stent placement  last assessed 6/17/14   • CARDIAC CATHETERIZATION      outcome: successful  last assessed 6/17/14   • CORONARY ANGIOPLASTY     • CORONARY ANGIOPLASTY WITH STENT PLACEMENT      to LAD, diagonal and RCA   • MALIGNANT SKIN LESION EXCISION  01/09/2012    Trunk,  SCC chest        Current Outpatient Medications:   •  aspirin 81 MG tablet, Take 1 tablet by mouth daily, Disp: , Rfl:   •  atorvastatin (LIPITOR) 20 mg tablet, TAKE 1 TABLET BY MOUTH EVERY DAY, Disp: 90 tablet, Rfl: 1  •  clopidogrel (PLAVIX) 75 mg tablet, TAKE 1 TABLET BY MOUTH EVERY DAY, Disp: 90 tablet, Rfl: 3  •  lisinopril (ZESTRIL) 20 mg tablet, TAKE 1 TABLET BY MOUTH EVERY DAY, Disp: 90 tablet, Rfl: 3  •  metoprolol succinate (TOPROL-XL) 50 mg 24 hr tablet, TAKE 1 TABLET BY MOUTH EVERY DAY, Disp: 90 tablet, Rfl: 3  •  nitroglycerin (NITROSTAT) 0 4 mg SL tablet, Place 1 tablet under the tongue as needed, Disp: , Rfl:   •  triamcinolone (KENALOG) 0 5 % ointment, APPLY TO AFFECTED AREA TWICE A DAY, Disp: 30 g, Rfl: 0  No Known Allergies    Labs:  Office Visit on 10/19/2022 Component Date Value   • Case Report 10/19/2022                      Value:Surgical Pathology Report                         Case: [id number]                                   Authorizing Provider:  Mariana Archuleta MD          Collected:           10/19/2022 1613              Ordering Location:     [facility]           Received:            10/19/2022 [facility]                                                           Pathologist:           Manuel Laboy MD                                                           Specimens:   A) - Skin, Other, Specimen A) Left Forearm                                                          B) - Skin, Other, Specimen B) Left Back                                                             C) - Skin, Other, Specimen C) Mid Back                                                    • Final Diagnosis 10/19/2022                      Value: This result contains rich text formatting which cannot be displayed here  • Note 10/19/2022                      Value: This result contains rich text formatting which cannot be displayed here  • Additional Information 10/19/2022                      Value: This result contains rich text formatting which cannot be displayed here  • Gross Description 10/19/2022                      Value: This result contains rich text formatting which cannot be displayed here  • Clinical Information 10/19/2022                      Value:Specimen A; Left Forearm; Skin; Shave Biopsy; 79 y/o male with a suspicious lesion; Diff  Dx: Rule Out Basal Cell Carcinoma    Specimen B; Left Back; Skin; Shave Biopsy; 79 y/o male with a suspicious lesion; Diff  Dx: Rule Out Basal Cell Carcinoma      Specimen C; Mid Back; Skin; Shave Biopsy; 79 y/o male with a suspicious lesion; Diff   Dx: Rule Out Basal Cell Carcinoma    Attn: Derm Path     Imaging: No results found  Review of Systems:  Review of Systems   REVIEW OF SYSTEMS:  Constitutional:  Denies fever or chills   Eyes:  Denies change in visual acuity   HENT:  Denies nasal congestion or sore throat   Respiratory:  Denies cough or shortness of breath   Cardiovascular:  Denies chest pain or edema   GI:  Denies abdominal pain, nausea, vomiting, bloody stools or diarrhea   :  Denies dysuria, frequency, difficulty in micturition and nocturia  Musculoskeletal:  Denies back pain or joint pain   Neurologic:  Denies headache, focal weakness or sensory changes   Endocrine:  Denies polyuria or polydipsia   Lymphatic:  Denies swollen glands   Psychiatric:  anxiety     Physical Exam:    /80 (BP Location: Left arm, Patient Position: Sitting, Cuff Size: Standard)   Pulse 64   Resp 16   Ht 5' 10" (1 778 m)   Wt 103 kg (226 lb)   SpO2 98%   BMI 32 43 kg/m²     Physical Exam   PHYSICAL EXAM:  General:  Patient is not in acute distress   Head: Normocephalic, Atraumatic  HEENT:  Both pupils normal-size atraumatic, normocephalic, nonicteric  Neck:  JVP not raised  Trachea central  No carotid bruit  Respiratory:  normal breath sounds no crackles  no rhonchi  Cardiovascular:  Regular rate and rhythm no S3 no murmurs  GI:  Abdomen soft nontender  No organomegaly  Lymphatic:  No cervical or inguinal lymphadenopathy  Neurologic:  Patient is awake alert, oriented   Grossly nonfocal  Extremities no edema      Discussion/Summary:  Patient with multiple medical problems who seems to be doing reasonably well from cardiac standpoint  Previous studies reviewed with patient  Medications reviewed and possible side effects discussed  concepts of cardiovascular disease , signs and symptoms of heart disease  Dietary and risk factor modification reinforced  All questions answered  Safety measures reviewed  Patient advised to report any problems prompting medical attention      Patient's coronary interventions were many years ago  No recent stents  Patient does have excessive bruisability  Patient also had severe thrombocytopenia last year  Lengthy discussion with patient and family regarding indications for dual anti-platelet therapy  There is no strong compelling reason now for that  Will discontinue Plavix and continue aspirin 81 mg daily  Symptoms to watch out from cardiac standpoint which would indicate the need for further cardiac evaluation also discussed with patient  Medications reviewed  Check blood work  Follow-up in 6 months or earlier as needed  Patient is agreeable with the plan of care

## 2022-12-05 DIAGNOSIS — I10 ESSENTIAL HYPERTENSION: ICD-10-CM

## 2022-12-05 RX ORDER — LISINOPRIL 20 MG/1
TABLET ORAL
Qty: 90 TABLET | Refills: 3 | Status: SHIPPED | OUTPATIENT
Start: 2022-12-05

## 2022-12-13 ENCOUNTER — OFFICE VISIT (OUTPATIENT)
Dept: DERMATOLOGY | Facility: CLINIC | Age: 72
End: 2022-12-13

## 2022-12-13 VITALS — WEIGHT: 226 LBS | BODY MASS INDEX: 32.35 KG/M2 | HEIGHT: 70 IN

## 2022-12-13 DIAGNOSIS — D04.5 SQUAMOUS CELL CARCINOMA IN SITU (SCCIS) OF SKIN OF BACK: Primary | ICD-10-CM

## 2022-12-13 NOTE — PATIENT INSTRUCTIONS
Dr Zoran Bhakta - After Care Instructions for Non-Suture Surgery        If a bandage was applied, remove it the next day and shower or bathe as normal  (It is easier to remove the dressings if you soak them with water before you remove them)  2  Cleanse area twice daily with saline solution using a Q-Tip or cotton ball  Use saline to cleanse the areas  It can be purchased (make sure there are no additives or fragrances), but if you don't want to purchase it you can make your own  Saline may be made by adding 1 teaspoon of salt to 1 pint of boiling water  Place solution into a clean container and let cool before using  3   Apply Vaseline to area after cleansing  We prefer you not using Neosporin or any topical antibiotic ointment  They can cause rashes, blistering or irritation  It is important to keep the wound moist and not to let scab form  A thick, dry scab is not desirable  4  Continue treatment until skin has healed and no longer scabs  This could take about 4-6 weeks  5  Slight bleeding may occur after the Band-Aid is removed or the first few days after the procedure was done  DON'T PANIC! Apply continuous, direct pressure on the dressing over the wound for 15-20 minutes  HINTS:     Set a timer for 15-20 minutes to make sure you press on the wound long enough  Soaking the dressing before removing it should decrease chances of bleeding  If the wound is on the legs or arms, swelling may occur  Elevating the arm or leg above the level of the heart as much as possible will also decrease swelling, promote healing and decrease chances of bleeding  6  Slight redness around the outside of the wound and a yellow-white "soupy" color in the center of the wound is normal for skin trauma  It is not a sign of infection  An infection can take several days to develop  The lower the lesion is on the howard, the slower the healing   Therefore, do not be alarmed if lesions on the legs do not heal as quickly as those on the face  CALL OUR OFFICE IMMEDIATELY FOR ANY SIGNS OF INFECTION:  This includes fever, chills, increase redness, warmth, tenderness, severe discomfort, pus or foul smell coming from the wound  Call the office directly at [phone]       7  You might experience some pain or discomfort after surgery  This is normal      The best strategy for controlling your pain after surgery is around the clock pain control  You can take over the counter Acetaminophen (Tylenol) for discomfort, if no contradictions  If you are taking this at the maximum dose, you can alternate this with Motrin (Ibuprofen or Advil) as well  Alternating these medications with each other allows you to maximize your pain control  In addition to Tylenol and Motrin, you can use heating pads or ice packs on your incisions to help reduce your pain  How will I alternate your regular strength over-the-counter pain medication? You will take a dose of pain medication every three (3) hours  Start by taking 650 mg of Tylenol (2 pills of 325 mg)  3 Hours later take 600 mg of Motrin  (3 pills of 200 mg)  3 Hours after taking the Motrin take 650 mg of Tylenol  3 Hours after that take 600 mg of Motrin       SEE EXAMPLE: if your first dose of Tylenol is at 12:00 pm       12:00 pm  Tylenol 650 mg (2 pills of 325 mg)       3:00 pm Motrin 600 mg (3 pills of 200 mg)       6:00 pm Tylenol 650 mg (2 pills of 325 mg)       9:00 pm Motrin 600 mg (3 pills of 200 mg)      IMPORTANT DO NOT TAKE MORE THAT 4000 MG OF TYLENOL OR 3200 MG OF MOTRIN IN A 24-HOUR PERIOD       8  People heal at different rates  Post-operative pinkness or redness is common  It will fade in time  Scarring us usually minimal or barely noticeable  However, the risk of hypertrophic (thick) scarring exists with any surgery on the skin   Should a scar become thick or itchy, the complication can be treated and the scar improved  If you have any further questions that are not answered here - please call us at [phone]  If we are closed and there is an emergency (such as bleeding that won't stop after pressure and elevation), call the office and the answering service will get a hold of the doctor  Make sure to tell them you had surgery

## 2022-12-13 NOTE — PROGRESS NOTES
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     Objective:   Ht 5' 10" (1 778 m)   Wt 103 kg (226 lb)   BMI 32 43 kg/m²     Physical Exam:    General Appearance:    Alert, cooperative, no distress   Skin:   Previous site of biopsy noted  Procedure: Curettage & Electrodesication squamous cell cancer in situ  The reasons for the procedure were explained to the patient  The benefits and risks of the procedure were explained to the patient, including bleeding, infection, incomplete removal, prolonged anesthesia (weeks to months) and rarely nerve damage  The patient is aware that a scar will result from the procedure  The consent for the procedure was obtained verbally and in writing  Lesion Site:L upper back  Curetted Area Size (mm): 7    After alcohol prep 1% lidocaine with epinephrine anesthesia  Using a sterile curette, the appropriate area was curetted  The area was curetted and electrodesiccated x3  Final defect size: 9mm    The wound was left to heal by secondary intention  The wound was cleansed then covered with a dressing  Wound care instructions were verbally given and in writing  I performed the entire procedure  Patient tolerated procedure well  Assessment:     1  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  Authorizing Provider   aspirin 81 MG tablet Take 1 tablet by mouth daily   Yes Historical Provider, MD   atorvastatin (LIPITOR) 20 mg tablet TAKE 1 TABLET BY MOUTH EVERY DAY 10/28/22  Yes Db Newby MD   lisinopril (ZESTRIL) 20 mg tablet TAKE 1 TABLET BY MOUTH EVERY DAY 12/5/22  Yes Pushpa Mata MD   metoprolol succinate (TOPROL-XL) 50 mg 24 hr tablet TAKE 1 TABLET BY MOUTH EVERY DAY 7/7/22  Yes Unknown Quivers, MD   nitroglycerin (NITROSTAT) 0 4 mg SL tablet Place 1 tablet under the tongue as needed 5/15/14  Yes Historical Provider, MD   triamcinolone (KENALOG) 0 5 % ointment APPLY TO AFFECTED AREA TWICE A DAY 7/27/22  Yes Pushpa Mata MD     No Known Allergies    Juan Pablo Lewis MD  12/13/2022,11:51 AM    Portions of the record may have been created with voice recognition software   Occasional wrong word or "sound a like" substitutions may have occurred due to the inherent limitations of voice recognition software   Read the chart carefully and recognize, using context, where substitutions have occurred

## 2023-01-09 ENCOUNTER — TELEPHONE (OUTPATIENT)
Dept: GASTROENTEROLOGY | Facility: CLINIC | Age: 73
End: 2023-01-09

## 2023-01-17 ENCOUNTER — OFFICE VISIT (OUTPATIENT)
Dept: DERMATOLOGY | Facility: CLINIC | Age: 73
End: 2023-01-17

## 2023-01-17 VITALS — WEIGHT: 226 LBS | HEIGHT: 70 IN | BODY MASS INDEX: 32.35 KG/M2

## 2023-01-17 DIAGNOSIS — D04.5 SQUAMOUS CELL CARCINOMA IN SITU (SCCIS) OF SKIN OF BACK: Primary | ICD-10-CM

## 2023-01-17 NOTE — PROGRESS NOTES
[facility] DERMATOLOGY  [address] [facility] [address]  [phone]  [phone]     MRN: [id number] : 1950  Encounter: [id number]  Patient Information: [address]    Subjective:     71year old male presents on recheck of previously curetted cell carcinoma in situ back patient notes some itching     Objective:   Ht 5' 10" (1 778 m)   Wt 103 kg (226 lb)   BMI 32 43 kg/m²     Physical Exam:    General Appearance:    Alert, cooperative, no distress   Skin:  Site of curettage well-healed without evidence of disease     Assessment:     1  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 Authorizing Provider   aspirin 81 MG tablet Take 1 tablet by mouth daily   Yes Historical Provider, MD   atorvastatin (LIPITOR) 20 mg tablet TAKE 1 TABLET BY MOUTH EVERY DAY 10/28/22  Yes Nicho Pandya MD   lisinopril (ZESTRIL) 20 mg tablet TAKE 1 TABLET BY MOUTH EVERY DAY 22  Yes Isela Sanchez MD   metoprolol succinate (TOPROL-XL) 50 mg 24 hr tablet TAKE 1 TABLET BY MOUTH EVERY DAY 22  Yes Nicho Pandya MD   nitroglycerin (NITROSTAT) 0 4 mg SL tablet Place 1 tablet under the tongue as needed 5/15/14  Yes Historical Provider, MD   triamcinolone (KENALOG) 0 5 % ointment APPLY TO AFFECTED AREA TWICE A DAY 22  Yes Isela Sanchez MD     No Known Allergies    Jonathan Fish MD  2023,9:05 AM    Portions of the record may have been created with voice recognition software   Occasional wrong word or "sound a like" substitutions may have occurred due to the inherent limitations of voice recognition software   Read the chart carefully and recognize, using context, where substitutions have occurred

## 2023-02-16 ENCOUNTER — OFFICE VISIT (OUTPATIENT)
Dept: DERMATOLOGY | Facility: CLINIC | Age: 73
End: 2023-02-16

## 2023-02-16 VITALS — HEIGHT: 70 IN | WEIGHT: 226 LBS | BODY MASS INDEX: 32.35 KG/M2

## 2023-02-16 DIAGNOSIS — D04.5 SQUAMOUS CELL CARCINOMA IN SITU (SCCIS) OF SKIN OF BACK: Primary | ICD-10-CM

## 2023-02-16 NOTE — PROGRESS NOTES
[address]  [facility] [address]  [phone]  [phone]     MRN: [id number] : 1950  Encounter: [id number]  Patient Information: Maribel Glynn    Subjective:     67-year-old male is for recheck of previously curetted squamous  cell carcinoma on the back patient concerned regarding burning and itching in the area     Objective:   Ht 5' 10" (1 778 m)   Wt 103 kg (226 lb)   BMI 32 43 kg/m²     Physical Exam:    General Appearance:    Alert, cooperative, no distress   Skin:  Previous site is well-healed no sign of any atypia there is some hypertrophic scar noted     Assessment:     1  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 Authorizing Provider   aspirin 81 MG tablet Take 1 tablet by mouth daily    Historical Provider, MD   atorvastatin (LIPITOR) 20 mg tablet TAKE 1 TABLET BY MOUTH EVERY DAY 10/28/22   Melodie Prado MD   lisinopril (ZESTRIL) 20 mg tablet TAKE 1 TABLET BY MOUTH EVERY DAY 22   Anthony Garcia MD   metoprolol succinate (TOPROL-XL) 50 mg 24 hr tablet TAKE 1 TABLET BY MOUTH EVERY DAY 22   Memo Macias MD   nitroglycerin (NITROSTAT) 0 4 mg SL tablet Place 1 tablet under the tongue as needed 5/15/14   Historical Provider, MD   triamcinolone (KENALOG) 0 5 % ointment APPLY TO AFFECTED AREA TWICE A DAY 22   Anthony Garcia MD     No Known Allergies    Maryam Childs MD  2023,2:29 PM    Portions of the record may have been created with voice recognition software   Occasional wrong word or "sound a like" substitutions may have occurred due to the inherent limitations of voice recognition software   Read the chart carefully and recognize, using context, where substitutions have occurred

## 2023-06-29 ENCOUNTER — OFFICE VISIT (OUTPATIENT)
Dept: CARDIOLOGY CLINIC | Facility: CLINIC | Age: 73
End: 2023-06-29
Payer: MEDICARE

## 2023-06-29 VITALS
DIASTOLIC BLOOD PRESSURE: 70 MMHG | SYSTOLIC BLOOD PRESSURE: 110 MMHG | HEIGHT: 70 IN | HEART RATE: 64 BPM | WEIGHT: 229 LBS | BODY MASS INDEX: 32.78 KG/M2 | RESPIRATION RATE: 16 BRPM | OXYGEN SATURATION: 98 %

## 2023-06-29 DIAGNOSIS — E78.2 COMBINED HYPERLIPIDEMIA: ICD-10-CM

## 2023-06-29 DIAGNOSIS — I10 BENIGN ESSENTIAL HTN: ICD-10-CM

## 2023-06-29 DIAGNOSIS — I25.10 CORONARY ARTERY DISEASE INVOLVING NATIVE CORONARY ARTERY OF NATIVE HEART WITHOUT ANGINA PECTORIS: Primary | ICD-10-CM

## 2023-06-29 DIAGNOSIS — R06.02 SHORTNESS OF BREATH: ICD-10-CM

## 2023-06-29 PROCEDURE — 99214 OFFICE O/P EST MOD 30 MIN: CPT | Performed by: INTERNAL MEDICINE

## 2023-06-29 NOTE — PROGRESS NOTES
[address] [place] [address]  Cardiology Follow Up    Goran Encarnacion  1950  [id number]      1  Coronary artery disease involving native coronary artery of native heart without angina pectoris  CBC and differential    Comprehensive metabolic panel      2  Benign essential HTN  CBC and differential    Comprehensive metabolic panel      3  Combined hyperlipidemia  Lipid panel      4  Shortness of breath  NM myocardial perfusion spect (stress and/or rest)          Chief Complaint   Patient presents with   • Follow-up       Interval History:   Patient presents for follow-up visit  Patient denies any history of chest pain   Patient does have some shortness of breath with heavy exertion  Last stress test was 3 years ago  Patient is planning on a cruise in August   Patient denies any history of leg edema or orthopnea PND  No history of presyncope syncope  Patient states compliance with the present list of medications      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, congenital    • Nephrotic syndrome    • Nonmelanoma skin cancer     last assessed 1/11/18   • Old myocardial infarction    • Skin cancer     last assessed 1/13/14   • 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      Social History     Socioeconomic History   • Marital status: /Civil Union     Spouse name: Not on file   • Number of children: Not on file   • Years of education: Not on file   • Highest education level: Not on file Occupational History   • Occupation: retired [employer]     Tobacco Use   • Smoking status: Never   • Smokeless tobacco: Never   Vaping Use   • Vaping Use: Never used   Substance and Sexual Activity   • Alcohol use:  Yes     Alcohol/week: 2 0 standard drinks of alcohol     Types: 2 Shots of liquor per week     Comment: Wine consumption (__glasses/day)    • Drug use: No   • Sexual activity: Not Currently   Other Topics Concern   • Not on file   Social History Narrative    DME: CPAP    Living independently with spouse     No advance directives     Social Determinants of Health     Financial Resource Strain: Not on file   Food Insecurity: Not on file   Transportation Needs: Not on file   Physical Activity: Sufficiently Active (2/23/2021)    Exercise Vital Sign    • Days of Exercise per Week: 4 days    • Minutes of Exercise per Session: 60 min   Stress: No Stress Concern Present (2/23/2021)    [address]    • Feeling of Stress : Not at all   Social Connections: Not on file   Intimate Partner Violence: Not on file   Housing Stability: Not on file      Family History   Problem Relation Age of Onset   • Heart attack Father 50   • Cancer Father    • Coronary artery disease Family      Past Surgical History:   Procedure Laterality Date   • ATHERECTOMY      1 with stent placement  last assessed 6/17/14   • CARDIAC CATHETERIZATION      outcome: successful  last assessed 6/17/14   • CORONARY ANGIOPLASTY     • CORONARY ANGIOPLASTY WITH STENT PLACEMENT      to LAD, diagonal and RCA   • MALIGNANT SKIN LESION EXCISION  01/09/2012    Trunk,  SCC chest        Current Outpatient Medications:   •  aspirin 81 MG tablet, Take 1 tablet by mouth daily, Disp: , Rfl:   •  atorvastatin (LIPITOR) 20 mg tablet, TAKE 1 TABLET BY MOUTH EVERY DAY, Disp: 90 tablet, Rfl: 1  •  lisinopril (ZESTRIL) 20 mg tablet, TAKE 1 TABLET BY MOUTH EVERY DAY, Disp: 90 tablet, Rfl: 3  • metoprolol succinate (TOPROL-XL) 50 mg 24 hr tablet, TAKE 1 TABLET BY MOUTH EVERY DAY, Disp: 90 tablet, Rfl: 3  •  nitroglycerin (NITROSTAT) 0 4 mg SL tablet, Place 1 tablet under the tongue as needed, Disp: , Rfl:   No Known Allergies    Labs:  No visits with results within 2 Month(s) from this visit  Latest known visit with results is:   Office Visit on 10/19/2022   Component Date Value   • Case Report 10/19/2022                      Value:Surgical Pathology Report                         Case: [id number]                                   Authorizing Provider:  Georgina Gregory MD          Collected:           10/19/2022 1613              Ordering Location:     [facility]           Received:            10/19/2022 [facility]                                                           Pathologist:           Talha Wray MD                                                           Specimens:   A) - Skin, Other, Specimen A) Left Forearm                                                          B) - Skin, Other, Specimen B) Left Back                                                             C) - Skin, Other, Specimen C) Mid Back                                                    • Final Diagnosis 10/19/2022                      Value: This result contains rich text formatting which cannot be displayed here  • Note 10/19/2022                      Value: This result contains rich text formatting which cannot be displayed here  • Additional Information 10/19/2022                      Value: This result contains rich text formatting which cannot be displayed here  • Gross Description 10/19/2022                      Value: This result contains rich text formatting which cannot be displayed here     • Clinical Information 10/19/2022                      Value:Specimen A; "Left Forearm; Skin; Shave Biopsy; 81 y/o male with a suspicious lesion; Diff  Dx: Rule Out Basal Cell Carcinoma    Specimen B; Left Back; Skin; Shave Biopsy; 81 y/o male with a suspicious lesion; Diff  Dx: Rule Out Basal Cell Carcinoma      Specimen C; Mid Back; Skin; Shave Biopsy; 81 y/o male with a suspicious lesion; Diff  Dx: Rule Out Basal Cell Carcinoma    Attn: Derm Path     Imaging: No results found  Review of Systems:  Review of Systems   REVIEW OF SYSTEMS:  Constitutional:  Denies fever or chills   Eyes:  Denies change in visual acuity   HENT:  Denies nasal congestion or sore throat   Respiratory:   shortness of breath   Cardiovascular:  Denies chest pain or edema   GI:  Denies abdominal pain, nausea, vomiting, bloody stools or diarrhea   :  Denies dysuria, frequency, difficulty in micturition and nocturia  Musculoskeletal:  Denies back pain or joint pain   Neurologic:  Denies headache, focal weakness or sensory changes   Endocrine:  Denies polyuria or polydipsia   Lymphatic:  Denies swollen glands   Psychiatric:  Denies depression or anxiety      Physical Exam:    /70 (BP Location: Left arm, Patient Position: Sitting, Cuff Size: Standard)   Pulse 64   Resp 16   Ht 5' 10\" (1 778 m)   Wt 104 kg (229 lb)   SpO2 98%   BMI 32 86 kg/m²     Physical Exam   PHYSICAL EXAM:  General:  Patient is not in acute distress   Head: Normocephalic, Atraumatic  HEENT:  Both pupils normal-size atraumatic, normocephalic, nonicteric  Neck:  JVP not raised  Trachea central  No carotid bruit  Respiratory:  normal breath sounds no crackles  no rhonchi  Cardiovascular:  Regular rate and rhythm no S3 no murmurs  GI:  Abdomen soft nontender  No organomegaly  Lymphatic:  No cervical or inguinal lymphadenopathy  Neurologic:  Patient is awake alert, oriented    Grossly nonfocal  Extremities no edema    Discussion/Summary:    Patient with multiple medical problems who seems to be doing reasonably well from cardiac " standpoint  Previous studies reviewed with patient  Medications reviewed and possible side effects discussed  concepts of cardiovascular disease , signs and symptoms of heart disease  Dietary and risk factor modification reinforced  All questions answered  Safety measures reviewed  Patient advised to report any problems prompting medical attention  Given known history of CAD status post PCI in the past with multiple risk factors for coronary artery disease and symptoms of shortness of breath, patient will be scheduled for a nuclear exercise stress test to assess for exercise capacity as well as ischemia  Sensitivity and specificity of stress test discussed at length with patient  Symptoms watch her from cardiac standpoint which were indicate the need for further cardiac evaluation also discussed  Check blood work including CBC CMP and lipid panel  Continue dietary and risk factor modification  Medications were reviewed  Follow-up in 6 months or earlier as needed  Patient is agreeable with the plan of care

## 2023-06-30 ENCOUNTER — TELEPHONE (OUTPATIENT)
Dept: CARDIOLOGY CLINIC | Facility: CLINIC | Age: 73
End: 2023-06-30

## 2023-06-30 ENCOUNTER — APPOINTMENT (OUTPATIENT)
Dept: LAB | Facility: HOSPITAL | Age: 73
End: 2023-06-30
Payer: MEDICARE

## 2023-06-30 LAB
ALBUMIN SERPL BCP-MCNC: 4.1 G/DL (ref 3.5–5)
ALP SERPL-CCNC: 56 U/L (ref 34–104)
ALT SERPL W P-5'-P-CCNC: 30 U/L (ref 7–52)
ANION GAP SERPL CALCULATED.3IONS-SCNC: 5 MMOL/L
AST SERPL W P-5'-P-CCNC: 23 U/L (ref 13–39)
BASOPHILS # BLD AUTO: 0.07 THOUSANDS/ÂΜL (ref 0–0.1)
BASOPHILS NFR BLD AUTO: 1 % (ref 0–1)
BILIRUB SERPL-MCNC: 0.97 MG/DL (ref 0.2–1)
BUN SERPL-MCNC: 22 MG/DL (ref 5–25)
CALCIUM SERPL-MCNC: 9.5 MG/DL (ref 8.4–10.2)
CHLORIDE SERPL-SCNC: 106 MMOL/L (ref 96–108)
CHOLEST SERPL-MCNC: 140 MG/DL
CO2 SERPL-SCNC: 27 MMOL/L (ref 21–32)
CREAT SERPL-MCNC: 0.89 MG/DL (ref 0.6–1.3)
EOSINOPHIL # BLD AUTO: 0.34 THOUSAND/ÂΜL (ref 0–0.61)
EOSINOPHIL NFR BLD AUTO: 6 % (ref 0–6)
ERYTHROCYTE [DISTWIDTH] IN BLOOD BY AUTOMATED COUNT: 12.5 % (ref 11.6–15.1)
GFR SERPL CREATININE-BSD FRML MDRD: 86 ML/MIN/1.73SQ M
GLUCOSE P FAST SERPL-MCNC: 109 MG/DL (ref 65–99)
HCT VFR BLD AUTO: 47.8 % (ref 36.5–49.3)
HDLC SERPL-MCNC: 43 MG/DL
HGB BLD-MCNC: 16.6 G/DL (ref 12–17)
IMM GRANULOCYTES # BLD AUTO: 0.01 THOUSAND/UL (ref 0–0.2)
IMM GRANULOCYTES NFR BLD AUTO: 0 % (ref 0–2)
LDLC SERPL CALC-MCNC: 83 MG/DL (ref 0–100)
LYMPHOCYTES # BLD AUTO: 2 THOUSANDS/ÂΜL (ref 0.6–4.47)
LYMPHOCYTES NFR BLD AUTO: 34 % (ref 14–44)
MCH RBC QN AUTO: 31.1 PG (ref 26.8–34.3)
MCHC RBC AUTO-ENTMCNC: 34.7 G/DL (ref 31.4–37.4)
MCV RBC AUTO: 90 FL (ref 82–98)
MONOCYTES # BLD AUTO: 0.66 THOUSAND/ÂΜL (ref 0.17–1.22)
MONOCYTES NFR BLD AUTO: 11 % (ref 4–12)
NEUTROPHILS # BLD AUTO: 2.83 THOUSANDS/ÂΜL (ref 1.85–7.62)
NEUTS SEG NFR BLD AUTO: 48 % (ref 43–75)
NONHDLC SERPL-MCNC: 97 MG/DL
NRBC BLD AUTO-RTO: 0 /100 WBCS
PLATELET # BLD AUTO: 163 THOUSANDS/UL (ref 149–390)
PMV BLD AUTO: 9.7 FL (ref 8.9–12.7)
POTASSIUM SERPL-SCNC: 4.6 MMOL/L (ref 3.5–5.3)
PROT SERPL-MCNC: 7 G/DL (ref 6.4–8.4)
RBC # BLD AUTO: 5.34 MILLION/UL (ref 3.88–5.62)
SODIUM SERPL-SCNC: 138 MMOL/L (ref 135–147)
TRIGL SERPL-MCNC: 70 MG/DL
WBC # BLD AUTO: 5.91 THOUSAND/UL (ref 4.31–10.16)

## 2023-06-30 NOTE — TELEPHONE ENCOUNTER
----- Message from Judge Teresa MD sent at 6/30/2023 11:46 AM EDT -----  Please call the patient  Blood work overall good  Blood sugar is slightly elevated at 109  Patient to watch diet

## 2023-07-07 ENCOUNTER — HOSPITAL ENCOUNTER (OUTPATIENT)
Dept: NON INVASIVE DIAGNOSTICS | Facility: CLINIC | Age: 73
Discharge: HOME | End: 2023-07-07
Payer: MEDICARE

## 2023-07-07 VITALS
SYSTOLIC BLOOD PRESSURE: 168 MMHG | WEIGHT: 229 LBS | OXYGEN SATURATION: 99 % | HEIGHT: 70 IN | HEART RATE: 53 BPM | DIASTOLIC BLOOD PRESSURE: 106 MMHG | BODY MASS INDEX: 32.78 KG/M2

## 2023-07-07 DIAGNOSIS — R06.02 SHORTNESS OF BREATH: ICD-10-CM

## 2023-07-07 LAB
CHEST PAIN STATEMENT: NORMAL
MAX DIASTOLIC BP: 104 MMHG
MAX HEART RATE: 134 BPM
MAX HR PERCENT: 90 %
MAX HR: 134 BPM
MAX PREDICTED HEART RATE: 149 BPM
MAX. SYSTOLIC BP: 226 MMHG
NUC STRESS DIASTOLIC VOLUME INDEX: 140 ML/M2
NUC STRESS EJECTION FRACTION: 44 %
NUC STRESS SYSTOLIC VOLUME INDEX: 78 ML/M2
PROTOCOL NAME: NORMAL
RATE PRESSURE PRODUCT: NORMAL
SL CV REST NUCLEAR ISOTOPE DOSE: 10.7 MCI
SL CV STRESS NUCLEAR ISOTOPE DOSE: 31.1 MCI
SL CV STRESS RECOVERY BP: NORMAL MMHG
SL CV STRESS RECOVERY HR: 95 BPM
SL CV STRESS RECOVERY O2 SAT: 98 %
SL CV STRESS STAGE REACHED: 3
STRESS ANGINA INDEX: 0
STRESS BASELINE BP: NORMAL MMHG
STRESS BASELINE HR: 53 BPM
STRESS O2 SAT REST: 99 %
STRESS PEAK HR: 134 BPM
STRESS POST ESTIMATED WORKLOAD: 10.1 METS
STRESS POST EXERCISE DUR MIN: 9 MIN
STRESS POST O2 SAT PEAK: 99 %
STRESS POST PEAK BP: 226 MMHG
STRESS/REST PERFUSION RATIO: 1.15
TARGET HR FORMULA: NORMAL
TEST INDICATION: NORMAL
TIME IN EXERCISE PHASE: NORMAL

## 2023-07-07 PROCEDURE — 78452 HT MUSCLE IMAGE SPECT MULT: CPT | Performed by: INTERNAL MEDICINE

## 2023-07-07 PROCEDURE — A9502 TC99M TETROFOSMIN: HCPCS

## 2023-07-07 PROCEDURE — 93016 CV STRESS TEST SUPVJ ONLY: CPT | Performed by: INTERNAL MEDICINE

## 2023-07-07 PROCEDURE — G1004 CDSM NDSC: HCPCS

## 2023-07-07 PROCEDURE — 93018 CV STRESS TEST I&R ONLY: CPT | Performed by: INTERNAL MEDICINE

## 2023-07-07 PROCEDURE — 78452 HT MUSCLE IMAGE SPECT MULT: CPT

## 2023-07-07 PROCEDURE — 93017 CV STRESS TEST TRACING ONLY: CPT

## 2023-07-12 LAB
CHEST PAIN STATEMENT: NORMAL
MAX DIASTOLIC BP: 104 MMHG
MAX HEART RATE: 134 BPM
MAX PREDICTED HEART RATE: 149 BPM
MAX. SYSTOLIC BP: 226 MMHG
PROTOCOL NAME: NORMAL
TARGET HR FORMULA: NORMAL
TEST INDICATION: NORMAL
TIME IN EXERCISE PHASE: NORMAL

## 2023-07-17 ENCOUNTER — TELEPHONE (OUTPATIENT)
Dept: CARDIOLOGY CLINIC | Facility: CLINIC | Age: 73
End: 2023-07-17

## 2023-07-17 NOTE — TELEPHONE ENCOUNTER
----- Message from Wilbert Adler MD sent at 7/16/2023  7:59 AM EDT -----  There are some abnormalities on the stress test  He needs to be seen by one of the providers/AP this week. Please let me know when he is scheduled.

## 2023-07-18 ENCOUNTER — OFFICE VISIT (OUTPATIENT)
Dept: CARDIOLOGY CLINIC | Facility: CLINIC | Age: 73
End: 2023-07-18
Payer: MEDICARE

## 2023-07-18 VITALS
WEIGHT: 231 LBS | BODY MASS INDEX: 33.07 KG/M2 | SYSTOLIC BLOOD PRESSURE: 138 MMHG | HEIGHT: 70 IN | DIASTOLIC BLOOD PRESSURE: 86 MMHG | RESPIRATION RATE: 21 BRPM | OXYGEN SATURATION: 98 % | HEART RATE: 65 BPM

## 2023-07-18 DIAGNOSIS — I25.10 CORONARY ARTERY DISEASE INVOLVING NATIVE HEART WITHOUT ANGINA PECTORIS, UNSPECIFIED VESSEL OR LESION TYPE: Primary | ICD-10-CM

## 2023-07-18 PROCEDURE — 99214 OFFICE O/P EST MOD 30 MIN: CPT | Performed by: INTERNAL MEDICINE

## 2023-07-18 RX ORDER — ISOSORBIDE MONONITRATE 30 MG/1
30 TABLET, EXTENDED RELEASE ORAL DAILY
Qty: 30 TABLET | Refills: 3 | Status: SHIPPED | OUTPATIENT
Start: 2023-07-18

## 2023-07-18 RX ORDER — NITROGLYCERIN 0.4 MG/1
0.4 TABLET SUBLINGUAL AS NEEDED
Qty: 30 TABLET | Refills: 3 | Status: SHIPPED | OUTPATIENT
Start: 2023-07-18

## 2023-07-18 NOTE — PROGRESS NOTES
Cardiology Follow Up    Lore Nazario  1950  [id number]  [facility] [address] [facility] [address] [phone]    Discussion/Summary:  1. CAD s/p multiple prior PCI  2. Hypertension  3. Hyperlipidemia    Patient presents to discuss stress test.  Stress test was reviewed in person with patient. Discussed and offered cardiac catheterization. Indication, risk, benefit and alternatives were discussed. Patient wants to hold off on invasive assessment with cardiac cath at this time. Patient understands risk of coronary artery disease including cardiac arrhythmias, MI and death. He is going on a  10 day cruise and does not want to delay it. Also states he has no symptoms. Completely denies all complaints. Wants to go with medications for now. He will inform us if he has any symptoms    Will uptitrate medical therapy with addition of imdur. He has sublingual NTG at home which he has at home. Refilled sublingual nitroglycerin. We will also check echocardiogram.    Patient to follow-up in 1 month with Dr. Chantell Bardales. Previous studies were reviewed. Safety measures were reviewed. Questions were entertained and answered. Patient was advised to report any problems requiring medical attention. Follow-up with PCP and appropriate specialist and lab work as discussed. Return for follow up visit as scheduled or earlier, if needed. Thank you for allowing me to participate in the care and evaluation of your patient. Should you have any questions, please feel free to contact me. History of Present Illness:     Lore Nazario is a 71 y.o. male who presents for follow up for cardiovascular care. Exercises regularly. He does brisk walking and weight lifting. Uses a rowing machine. Is working on his yard. Chopping wood as of 2 weeks ago. States he had no symptoms. Denies chest pain, chest pressure, shortness of breath, dizziness, lightheadedness, presyncope or syncope. Denies orthopnea, PND or lower limb edema. He did feel short of breath while on the treadmill. 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, congenital    • Nephrotic syndrome    • Nonmelanoma skin cancer     last assessed 1/11/18   • Old myocardial infarction    • Skin cancer     last assessed 1/13/14   • 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      Social History     Socioeconomic History   • Marital status: /Civil Union     Spouse name: Not on file   • Number of children: Not on file   • Years of education: Not on file   • Highest education level: Not on file   Occupational History   • 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    Tobacco Use   • Smoking status: Never   • Smokeless tobacco: Never   Vaping Use   • Vaping Use: Never used   Substance and Sexual Activity   • Alcohol use:  Yes     Alcohol/week: 2.0 standard drinks of alcohol     Types: 2 Shots of liquor per week     Comment: Wine consumption (__glasses/day)    • Drug use: No   • Sexual activity: Not Currently   Other Topics Concern   • Not on file   Social History Narrative    DME: CPAP    Living independently with spouse     No advance directives     Social Determinants of Health     Financial Resource Strain: Not on file   Food Insecurity: Not on file   Transportation Needs: Not on file   Physical Activity: Sufficiently Active (2/23/2021) Exercise Vital Sign    • Days of Exercise per Week: 4 days    • Minutes of Exercise per Session: 60 min   Stress: No Stress Concern Present (2/23/2021)    [address] [facility]    • Feeling of Stress : Not at all   Social Connections: Not on file   Intimate Partner Violence: Not on file   Housing Stability: Not on file      Family History   Problem Relation Age of Onset   • Heart attack Father 50   • Cancer Father    • Coronary artery disease Family      Past Surgical History:   Procedure Laterality Date   • ATHERECTOMY      1 with stent placement  last assessed 6/17/14   • CARDIAC CATHETERIZATION      outcome: successful  last assessed 6/17/14   • CORONARY ANGIOPLASTY     • CORONARY ANGIOPLASTY WITH STENT PLACEMENT      to LAD, diagonal and RCA   • MALIGNANT SKIN LESION EXCISION  01/09/2012    Trunk,  SCC chest        Current Outpatient Medications:   •  aspirin 81 MG tablet, Take 1 tablet by mouth daily, Disp: , Rfl:   •  atorvastatin (LIPITOR) 20 mg tablet, TAKE 1 TABLET BY MOUTH EVERY DAY, Disp: 90 tablet, Rfl: 1  •  lisinopril (ZESTRIL) 20 mg tablet, TAKE 1 TABLET BY MOUTH EVERY DAY, Disp: 90 tablet, Rfl: 3  •  metoprolol succinate (TOPROL-XL) 50 mg 24 hr tablet, TAKE 1 TABLET BY MOUTH EVERY DAY, Disp: 90 tablet, Rfl: 3  •  nitroglycerin (NITROSTAT) 0.4 mg SL tablet, Place 1 tablet under the tongue as needed (Patient not taking: Reported on 7/18/2023), Disp: , Rfl:   No Known Allergies    Labs:  Lab Results   Component Value Date    WBC 5.91 06/30/2023    HGB 16.6 06/30/2023    HCT 47.8 06/30/2023    MCV 90 06/30/2023     06/30/2023     Lab Results   Component Value Date    GLUCOSE 107 10/06/2015    CALCIUM 9.5 06/30/2023     10/06/2015    K 4.6 06/30/2023    CO2 27 06/30/2023     06/30/2023    BUN 22 06/30/2023    CREATININE 0.89 06/30/2023     Lab Results   Component Value Date    HGBA1C 5.2 01/12/2022     Lab Results   Component Value Date    CHOL 149 10/06/2015     Lab Results   Component Value Date    HDL 43 06/30/2023    HDL 43 01/12/2022    HDL 45 07/13/2021     Lab Results   Component Value Date    LDLCALC 83 06/30/2023    LDLCALC 106 (H) 01/12/2022    LDLCALC 86 07/13/2021     Lab Results   Component Value Date    TRIG 70 06/30/2023    TRIG 120 01/12/2022    TRIG 118 07/13/2021     No results found for: "CHOLHDL"    Review of Systems:  Review of Systems   Constitutional: Negative. Negative for activity change, appetite change and fatigue. Respiratory: Negative for chest tightness and shortness of breath. Cardiovascular: Negative for chest pain, palpitations and leg swelling. Gastrointestinal: Negative for nausea and vomiting. Musculoskeletal: Negative for arthralgias and back pain. Neurological: Negative for dizziness, syncope, weakness and light-headedness. Psychiatric/Behavioral: Negative for agitation and behavioral problems. All other systems reviewed and are negative. Physical Exam:  Physical Exam  Vitals and nursing note reviewed. Constitutional:       General: He is not in acute distress. Appearance: Normal appearance. He is not ill-appearing or diaphoretic. HENT:      Head: Normocephalic and atraumatic. Eyes:      Extraocular Movements: Extraocular movements intact. Cardiovascular:      Rate and Rhythm: Normal rate and regular rhythm. Heart sounds: No murmur heard. No gallop. Pulmonary:      Effort: No respiratory distress. Breath sounds: Normal breath sounds. Abdominal:      General: There is no distension. Palpations: Abdomen is soft. Musculoskeletal:         General: No swelling. Normal range of motion. Cervical back: Normal range of motion. Skin:     General: Skin is warm and dry. Capillary Refill: Capillary refill takes less than 2 seconds. Coloration: Skin is not pale. Neurological:      General: No focal deficit present.       Mental Status: He is alert and oriented to person, place, and time. Cranial Nerves: No cranial nerve deficit.    Psychiatric:         Mood and Affect: Mood normal.         Behavior: Behavior normal.

## 2023-07-27 DIAGNOSIS — I10 BENIGN ESSENTIAL HTN: ICD-10-CM

## 2023-07-27 RX ORDER — METOPROLOL SUCCINATE 50 MG/1
TABLET, EXTENDED RELEASE ORAL
Qty: 90 TABLET | Refills: 3 | Status: SHIPPED | OUTPATIENT
Start: 2023-07-27

## 2023-07-31 ENCOUNTER — HOSPITAL ENCOUNTER (OUTPATIENT)
Dept: NON INVASIVE DIAGNOSTICS | Facility: CLINIC | Age: 73
Discharge: HOME | End: 2023-07-31
Payer: MEDICARE

## 2023-07-31 VITALS
BODY MASS INDEX: 33.07 KG/M2 | DIASTOLIC BLOOD PRESSURE: 86 MMHG | HEIGHT: 70 IN | WEIGHT: 231 LBS | SYSTOLIC BLOOD PRESSURE: 138 MMHG | HEART RATE: 63 BPM

## 2023-07-31 DIAGNOSIS — I25.10 CORONARY ARTERY DISEASE INVOLVING NATIVE HEART WITHOUT ANGINA PECTORIS, UNSPECIFIED VESSEL OR LESION TYPE: ICD-10-CM

## 2023-07-31 LAB
AORTIC ROOT: 3.3 CM
APICAL FOUR CHAMBER EJECTION FRACTION: 48 %
ASCENDING AORTA: 3.3 CM
E WAVE DECELERATION TIME: 221 MS
FRACTIONAL SHORTENING: 29 % (ref 28–44)
INTERVENTRICULAR SEPTUM IN DIASTOLE (PARASTERNAL SHORT AXIS VIEW): 1.2 CM
INTERVENTRICULAR SEPTUM: 1.2 CM (ref 0.6–1.1)
LAAS-AP2: 11.9 CM2
LAAS-AP4: 17.8 CM2
LEFT ATRIUM SIZE: 4.2 CM
LEFT ATRIUM VOLUME (MOD BIPLANE): 38 ML
LEFT INTERNAL DIMENSION IN SYSTOLE: 4.7 CM (ref 2.1–4)
LEFT VENTRICLE DIASTOLIC VOLUME (MOD BIPLANE): 101 ML
LEFT VENTRICLE SYSTOLIC VOLUME (MOD BIPLANE): 54 ML
LEFT VENTRICULAR INTERNAL DIMENSION IN DIASTOLE: 6.6 CM (ref 3.5–6)
LEFT VENTRICULAR POSTERIOR WALL IN END DIASTOLE: 1.2 CM
LEFT VENTRICULAR STROKE VOLUME: 119 ML
LV EF: 47 %
LVSV (TEICH): 119 ML
MV E'TISSUE VEL-SEP: 6 CM/S
MV PEAK A VEL: 0.58 M/S
MV PEAK E VEL: 58 CM/S
MV STENOSIS PRESSURE HALF TIME: 64 MS
MV VALVE AREA P 1/2 METHOD: 3.44 CM2
RIGHT ATRIUM AREA SYSTOLE A4C: 12.3 CM2
RIGHT VENTRICLE ID DIMENSION: 3.7 CM
SL CV LEFT ATRIUM LENGTH A2C: 4.5 CM
SL CV LV EF: 40
SL CV PED ECHO LEFT VENTRICLE DIASTOLIC VOLUME (MOD BIPLANE) 2D: 222 ML
SL CV PED ECHO LEFT VENTRICLE SYSTOLIC VOLUME (MOD BIPLANE) 2D: 102 ML
TRICUSPID ANNULAR PLANE SYSTOLIC EXCURSION: 2 CM

## 2023-07-31 PROCEDURE — 93306 TTE W/DOPPLER COMPLETE: CPT

## 2023-07-31 PROCEDURE — 93306 TTE W/DOPPLER COMPLETE: CPT | Performed by: INTERNAL MEDICINE

## 2023-08-01 ENCOUNTER — TELEPHONE (OUTPATIENT)
Dept: CARDIOLOGY CLINIC | Facility: CLINIC | Age: 73
End: 2023-08-01

## 2023-08-01 NOTE — TELEPHONE ENCOUNTER
----- Message from Roxane Mckeon MD sent at 8/1/2023 10:18 AM EDT -----  Please let patient know that his heart function was mild to moderately reduced  He should keep his follow up with Dr Carson Santiago  Thanks

## 2023-08-11 DIAGNOSIS — I25.10 CORONARY ARTERY DISEASE INVOLVING NATIVE HEART WITHOUT ANGINA PECTORIS, UNSPECIFIED VESSEL OR LESION TYPE: ICD-10-CM

## 2023-08-11 RX ORDER — ISOSORBIDE MONONITRATE 30 MG/1
30 TABLET, EXTENDED RELEASE ORAL DAILY
Qty: 90 TABLET | Refills: 3 | Status: SHIPPED | OUTPATIENT
Start: 2023-08-11 | End: 2023-08-17

## 2023-08-17 ENCOUNTER — OFFICE VISIT (OUTPATIENT)
Dept: CARDIOLOGY CLINIC | Facility: CLINIC | Age: 73
End: 2023-08-17
Payer: MEDICARE

## 2023-08-17 VITALS
SYSTOLIC BLOOD PRESSURE: 124 MMHG | RESPIRATION RATE: 16 BRPM | HEART RATE: 66 BPM | WEIGHT: 233 LBS | DIASTOLIC BLOOD PRESSURE: 78 MMHG | BODY MASS INDEX: 33.36 KG/M2 | HEIGHT: 70 IN | OXYGEN SATURATION: 97 %

## 2023-08-17 DIAGNOSIS — I10 BENIGN ESSENTIAL HTN: ICD-10-CM

## 2023-08-17 DIAGNOSIS — E78.2 HYPERLIPEMIA, MIXED: ICD-10-CM

## 2023-08-17 DIAGNOSIS — R94.39 ABNORMAL STRESS TEST: ICD-10-CM

## 2023-08-17 DIAGNOSIS — R06.02 SHORTNESS OF BREATH: ICD-10-CM

## 2023-08-17 DIAGNOSIS — I25.10 CORONARY ARTERY DISEASE INVOLVING NATIVE HEART WITHOUT ANGINA PECTORIS, UNSPECIFIED VESSEL OR LESION TYPE: Primary | ICD-10-CM

## 2023-08-17 PROCEDURE — 99214 OFFICE O/P EST MOD 30 MIN: CPT | Performed by: INTERNAL MEDICINE

## 2023-08-17 RX ORDER — ISOSORBIDE MONONITRATE 30 MG/1
30 TABLET, EXTENDED RELEASE ORAL DAILY
Qty: 90 TABLET | Refills: 3
Start: 2023-08-17

## 2023-08-17 NOTE — PROGRESS NOTES
[address] [facility]  [address] [facility]   [facility] [place] [address]  Cardiology Follow Up    Sj Dmitry  1950  [id number]      1. Coronary artery disease involving native heart without angina pectoris, unspecified vessel or lesion type  CBC and differential    Basic metabolic panel    Protime-INR    isosorbide mononitrate (IMDUR) 30 mg 24 hr tablet      2. Benign essential HTN        3. Hyperlipemia, mixed        4. Shortness of breath        5. Abnormal stress test            Chief Complaint   Patient presents with   • Follow-up       Interval History: Patient presents for follow-up visit. Patient does have history of CAD status post MI status post PCI    CAD, MI x 2, PCI (3 stents- 4/00 LAD, 1/07 D1, 4/26/10 RCA),     Patient had an abnormal nuclear stress test recently. Patient does admit to some shortness of breath with exertion. Patient states that he has been compliant with all his present medications.       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, congenital    • Nephrotic syndrome    • Nonmelanoma skin cancer     last assessed 1/11/18   • Old myocardial infarction    • Skin cancer     last assessed 1/13/14   • 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      Social History     Socioeconomic History   • Marital status: /Civil Union     Spouse name: Not on file   • Number of children: Not on file   • Years of education: Not on file   • Highest education level: Not on file   Occupational History   • 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    Tobacco Use   • Smoking status: Never   • Smokeless tobacco: Never   Vaping Use   • Vaping Use: Never used   Substance and Sexual Activity   • Alcohol use:  Yes     Alcohol/week: 2.0 standard drinks of alcohol     Types: 2 Shots of liquor per week     Comment: Wine consumption (__glasses/day)    • Drug use: No   • Sexual activity: Not Currently   Other Topics Concern   • Not on file   Social History Narrative    DME: CPAP    Living independently with spouse     No advance directives     Social Determinants of Health     Financial Resource Strain: Not on file   Food Insecurity: Not on file   Transportation Needs: Not on file   Physical Activity: Sufficiently Active (2/23/2021)    Exercise Vital Sign    • Days of Exercise per Week: 4 days    • Minutes of Exercise per Session: 60 min   Stress: No Stress Concern Present (2/23/2021)    [address] [place] [address]    • Feeling of Stress : Not at all   Social Connections: Not on file   Intimate Partner Violence: Not on file   Housing Stability: Not on file      Family History   Problem Relation Age of Onset   • Heart attack Father 50   • Cancer Father    • Coronary artery disease Family      Past Surgical History:   Procedure Laterality Date   • ATHERECTOMY      1 with stent placement  last assessed 6/17/14   • CARDIAC CATHETERIZATION      outcome: successful  last assessed 6/17/14   • CORONARY ANGIOPLASTY     • CORONARY ANGIOPLASTY WITH STENT PLACEMENT      to LAD, diagonal and RCA   • MALIGNANT SKIN LESION EXCISION  01/09/2012    Trunk,  SCC chest        Current Outpatient Medications:   •  aspirin 81 MG tablet, Take 1 tablet by mouth daily, Disp: , Rfl:   •  atorvastatin (LIPITOR) 20 mg tablet, TAKE 1 TABLET BY MOUTH EVERY DAY, Disp: 90 tablet, Rfl: 1  •  isosorbide mononitrate (IMDUR) 30 mg 24 hr tablet, Take 1 tablet (30 mg total) by mouth daily, Disp: 90 tablet, Rfl: 3  •  lisinopril (ZESTRIL) 20 mg tablet, TAKE 1 TABLET BY MOUTH EVERY DAY, Disp: 90 tablet, Rfl: 3  •  metoprolol succinate (TOPROL-XL) 50 mg 24 hr tablet, TAKE 1 TABLET BY MOUTH EVERY DAY, Disp: 90 tablet, Rfl: 3  •  nitroglycerin (NITROSTAT) 0.4 mg SL tablet, Place 1 tablet (0.4 mg total) under the tongue as needed for chest pain, Disp: 30 tablet, Rfl: 3  No Known Allergies    Labs:  Hospital Outpatient Visit on 07/31/2023   Component Date Value   • LA size 07/31/2023 4.2    • LVPWd 07/31/2023 1.20    • Left Atrium Area-systoli* 07/31/2023 11.9    • Left Atrium Area-systoli* 07/31/2023 17.8    • MV E' Tissue Velocity Se* 07/31/2023 6    • Tricuspid annular plane * 07/31/2023 2.00    • IVSd 07/31/2023 2.99    • LV DIASTOLIC VOLUME (MOD* 91/93/6459 222    • LEFT VENTRICLE SYSTOLIC * 08/09/0404 442    • Left ventricular stroke * 07/31/2023 119.00    • EF 07/31/2023 47    • A4C EF 07/31/2023 48    • LA length (A2C) 07/31/2023 4.50    • LVIDd 07/31/2023 6.60    • IVS 07/31/2023 1.2    • LVIDS 07/31/2023 4.70    • FS 07/31/2023 29    • LA volume (BP) 07/31/2023 38    • Asc Ao 07/31/2023 3.3    • Ao root 07/31/2023 3.30    • RVID d 07/31/2023 3.7    • MV valve area p 1/2 meth* 07/31/2023 3.44    • E wave deceleration time 07/31/2023 221    • MV Peak E Frank 07/31/2023 58    • MV Peak A Frank 07/31/2023 0.01    • LV Systolic Volume (BP) 68/09/9196 54    • LV Diastolic Volume (BP) 73/96/3630 101    • RAA A4C 07/31/2023 12.3    • MV stenosis pressure 1/2* 07/31/2023 64    • LVSV, 2D 07/31/2023 119    • LV EF 07/31/2023 [address] [facility] Outpatient Visit on 07/07/2023   Component Date Value   • Rest Nuclear Isotope Dose 07/07/2023 10.70    • Stress Nuclear Isotope D* 07/07/2023 31.10    • Baseline HR 07/07/2023 53    • Baseline BP 07/07/2023 168/106    • O2 sat rest 07/07/2023 99    • Stress peak HR 07/07/2023 134    • Post peak BP 07/07/2023 226    • Rate Pressure Product 07/07/2023 30,284.0    • O2 sat peak 07/07/2023 99    • Recovery HR 07/07/2023 95    • Recovery BP 07/07/2023 194/86    • O2 sat recovery 07/07/2023 98    • Max HR 07/07/2023 134    • Max HR Percent 07/07/2023 90    • Exercise duration (min) 07/07/2023 9    • Estimated workload 07/07/2023 10.1    • Angina Index 07/07/2023 0    • Stress Stage Reached 07/07/2023 3.0    • Stress/rest perfusion ra* 07/07/2023 4.26    • End diastolic index (mL/* 81/82/0027 140.0    • EF (%) 07/07/2023 44    • End systolic index (mL/m* 33/83/7833 78.0    • Protocol Name 07/07/2023 FRANCISCA    • Time In Exercise Phase 07/07/2023 00:09:01    • MAX. SYSTOLIC BP 01/63/7629 038    • Max Diastolic Bp 12/79/5970 849    • Max Heart Rate 07/07/2023 134    • Max Predicted Heart Rate 07/07/2023 149    • Reason for Termination 07/07/2023                      Value:Target Heart Rate Achieved  Maximal exercise (symptom limited)     • Test Indication 07/07/2023 SOB    • Target Hr Formular 07/07/2023 (220 - Age)*85%    • Chest Pain Statement 07/07/2023 none      Imaging: Echo complete w/ contrast if indicated    Result Date: 7/31/2023  Narrative: •  Left Ventricle: Left ventricular cavity size is normal. Wall thickness is mildly increased. The left ventricular ejection fraction is 40%. Systolic function is moderately reduced. Diastolic function is mildly abnormal, consistent with grade I (abnormal) relaxation. •  The following segments are hypokinetic: mid anteroseptal, mid inferoseptal, apical septal and apex. •  Mitral Valve: There is mild regurgitation.        Review of Systems:  Review of Systems   REVIEW OF SYSTEMS:  Constitutional:  Denies fever or chills   Eyes:  Denies change in visual acuity   HENT:  Denies nasal congestion or sore throat   Respiratory:   shortness of breath   Cardiovascular:  Denies chest pain or edema   GI:  Denies abdominal pain, nausea, vomiting, bloody stools or diarrhea   :  Denies dysuria, frequency, difficulty in micturition and nocturia  Musculoskeletal:  Denies back pain or joint pain Neurologic:  Denies headache, focal weakness or sensory changes   Endocrine:  Denies polyuria or polydipsia   Lymphatic:  Denies swollen glands   Psychiatric:  Denies depression or anxiety    Physical Exam:    /78 (BP Location: Left arm, Patient Position: Sitting, Cuff Size: Standard)   Pulse 66   Resp 16   Ht 5' 10" (1.778 m)   Wt 106 kg (233 lb)   SpO2 97%   BMI 33.43 kg/m²     Physical Exam   PHYSICAL EXAM:  General:  Patient is not in acute distress   Head: Normocephalic, Atraumatic. HEENT:  Both pupils normal-size atraumatic, normocephalic, nonicteric  Neck:  JVP not raised. Trachea central. No carotid bruit  Respiratory:  normal breath sounds no crackles. no rhonchi  Cardiovascular:  Regular rate and rhythm no S3 no murmurs  GI:  Abdomen soft nontender. No organomegaly. Lymphatic:  No cervical or inguinal lymphadenopathy  Neurologic:  Patient is awake alert, oriented . Grossly nonfocal  Extremities no edema    Exercise nuclear stress test July 2023    1. Good exercise tolerance-10.1 METS  2. Resting EKG suggesting anteroseptal scar  3. Negative EKG portion of stress test  4. There is a moderate-sized fixed apical defect of severe intensity indicating previous apical scar  5. There is a moderate intensity fixed septal defect indicating prior septal scar  6. There is a mild intensity small mid anterior wall reversible  defect most consistent with mid anterior wall ischemia. 7.  Apical hypokinesis and generalized hypokinesis. Calculated ejection fraction 44%        Discussion/Summary: This patient has known history of CAD status post MI status post PCI with mild cardiomyopathy. Patient did have symptoms of shortness of breath and had a stress test which showed anterior wall ischemia. Patient has multiple risk factors for coronary disease including strong family history as well as hypertension and hyperlipidemia.     Risks and benefits of cardiac catheterization and possible revascularization options including but not limited to risk of infection, bleeding, risk of myocardial infarction, stroke, and risk of death discussed at length with patient. Patient understands the risks and benefits and wishes to proceed. Cardiac catheterization will be scheduled . Preprocedure workup will be done. Further cardiac evaluation and management after the results of cardiac catheterization. Patient denies any history of dye or seafood allergy. AUC  1. Is this for pre-valve or open heart surgery request?  NO    2. Did the pt have a stress test? YES    3. Is pt on maximal antianginal medications? YES                4.Is pt asymptomatic? YES    5. Does pt having ischemic symptoms? YES                  If yes, is what type? Anginal equivalent? Follow-up in 2 months or earlier as needed. Patient is agreeable with the plan of care.

## 2023-08-17 NOTE — H&P (VIEW-ONLY)
[address] [facility]  [address] [facility] [address]   [facility] [place] [address]  Cardiology Follow Up    Lorrie Ovalle  1950  [id number]      1. Coronary artery disease involving native heart without angina pectoris, unspecified vessel or lesion type  CBC and differential    Basic metabolic panel    Protime-INR    isosorbide mononitrate (IMDUR) 30 mg 24 hr tablet      2. Benign essential HTN        3. Hyperlipemia, mixed        4. Shortness of breath        5. Abnormal stress test            Chief Complaint   Patient presents with   • Follow-up       Interval History: Patient presents for follow-up visit. Patient does have history of CAD status post MI status post PCI    CAD, MI x 2, PCI (3 stents- 4/00 LAD, 1/07 D1, 4/26/10 RCA),     Patient had an abnormal nuclear stress test recently. Patient does admit to some shortness of breath with exertion. Patient states that he has been compliant with all his present medications.       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, congenital    • Nephrotic syndrome    • Nonmelanoma skin cancer     last assessed 1/11/18   • Old myocardial infarction    • Skin cancer     last assessed 1/13/14   • 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      Social History     Socioeconomic History   • Marital status: /Civil Union     Spouse name: Not on file   • Number of children: Not on file   • Years of education: Not on file   • Highest education level: Not on file   Occupational History   • 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    Tobacco Use   • Smoking status: Never   • Smokeless tobacco: Never   Vaping Use   • Vaping Use: Never used   Substance and Sexual Activity   • Alcohol use:  Yes     Alcohol/week: 2.0 standard drinks of alcohol     Types: 2 Shots of liquor per week     Comment: Wine consumption (__glasses/day)    • Drug use: No   • Sexual activity: Not Currently   Other Topics Concern   • Not on file   Social History Narrative    DME: CPAP    Living independently with spouse     No advance directives     Social Determinants of Health     Financial Resource Strain: Not on file   Food Insecurity: Not on file   Transportation Needs: Not on file   Physical Activity: Sufficiently Active (2/23/2021)    Exercise Vital Sign    • Days of Exercise per Week: 4 days    • Minutes of Exercise per Session: 60 min   Stress: No Stress Concern Present (2/23/2021)    [address] [place] [address]    • Feeling of Stress : Not at all   Social Connections: Not on file   Intimate Partner Violence: Not on file   Housing Stability: Not on file      Family History   Problem Relation Age of Onset   • Heart attack Father 50   • Cancer Father    • Coronary artery disease Family      Past Surgical History:   Procedure Laterality Date   • ATHERECTOMY      1 with stent placement  last assessed 6/17/14   • CARDIAC CATHETERIZATION      outcome: successful  last assessed 6/17/14   • CORONARY ANGIOPLASTY     • CORONARY ANGIOPLASTY WITH STENT PLACEMENT      to LAD, diagonal and RCA   • MALIGNANT SKIN LESION EXCISION  01/09/2012    Trunk,  SCC chest        Current Outpatient Medications:   •  aspirin 81 MG tablet, Take 1 tablet by mouth daily, Disp: , Rfl:   •  atorvastatin (LIPITOR) 20 mg tablet, TAKE 1 TABLET BY MOUTH EVERY DAY, Disp: 90 tablet, Rfl: 1  •  isosorbide mononitrate (IMDUR) 30 mg 24 hr tablet, Take 1 tablet (30 mg total) by mouth daily, Disp: 90 tablet, Rfl: 3  •  lisinopril (ZESTRIL) 20 mg tablet, TAKE 1 TABLET BY MOUTH EVERY DAY, Disp: 90 tablet, Rfl: 3  •  metoprolol succinate (TOPROL-XL) 50 mg 24 hr tablet, TAKE 1 TABLET BY MOUTH EVERY DAY, Disp: 90 tablet, Rfl: 3  •  nitroglycerin (NITROSTAT) 0.4 mg SL tablet, Place 1 tablet (0.4 mg total) under the tongue as needed for chest pain, Disp: 30 tablet, Rfl: 3  No Known Allergies    Labs:  Hospital Outpatient Visit on 07/31/2023   Component Date Value   • LA size 07/31/2023 4.2    • LVPWd 07/31/2023 1.20    • Left Atrium Area-systoli* 07/31/2023 11.9    • Left Atrium Area-systoli* 07/31/2023 17.8    • MV E' Tissue Velocity Se* 07/31/2023 6    • Tricuspid annular plane * 07/31/2023 2.00    • IVSd 07/31/2023 5.16    • LV DIASTOLIC VOLUME (MOD* 64/77/9686 222    • LEFT VENTRICLE SYSTOLIC * 36/81/8379 900    • Left ventricular stroke * 07/31/2023 119.00    • EF 07/31/2023 47    • A4C EF 07/31/2023 48    • LA length (A2C) 07/31/2023 4.50    • LVIDd 07/31/2023 6.60    • IVS 07/31/2023 1.2    • LVIDS 07/31/2023 4.70    • FS 07/31/2023 29    • LA volume (BP) 07/31/2023 38    • Asc Ao 07/31/2023 3.3    • Ao root 07/31/2023 3.30    • RVID d 07/31/2023 3.7    • MV valve area p 1/2 meth* 07/31/2023 3.44    • E wave deceleration time 07/31/2023 221    • MV Peak E Frank 07/31/2023 58    • MV Peak A Frank 07/31/2023 1.25    • LV Systolic Volume (BP) 67/07/3665 54    • LV Diastolic Volume (BP) 17/06/3117 101    • RAA A4C 07/31/2023 12.3    • MV stenosis pressure 1/2* 07/31/2023 64    • LVSV, 2D 07/31/2023 119    • LV EF 07/31/2023 [address] [facility] Outpatient Visit on 07/07/2023   Component Date Value   • Rest Nuclear Isotope Dose 07/07/2023 10.70    • Stress Nuclear Isotope D* 07/07/2023 31.10    • Baseline HR 07/07/2023 53    • Baseline BP 07/07/2023 168/106    • O2 sat rest 07/07/2023 99    • Stress peak HR 07/07/2023 134    • Post peak BP 07/07/2023 226    • Rate Pressure Product 07/07/2023 30,284.0    • O2 sat peak 07/07/2023 99    • Recovery HR 07/07/2023 95    • Recovery BP 07/07/2023 194/86    • O2 sat recovery 07/07/2023 98    • Max HR 07/07/2023 134    • Max HR Percent 07/07/2023 90    • Exercise duration (min) 07/07/2023 9    • Estimated workload 07/07/2023 10.1    • Angina Index 07/07/2023 0    • Stress Stage Reached 07/07/2023 3.0    • Stress/rest perfusion ra* 07/07/2023 8.36    • End diastolic index (mL/* 67/80/7981 140.0    • EF (%) 07/07/2023 44    • End systolic index (mL/m* 90/15/9852 78.0    • Protocol Name 07/07/2023 FRANCISCA    • Time In Exercise Phase 07/07/2023 00:09:01    • MAX. SYSTOLIC BP 15/45/7197 254    • Max Diastolic Bp 15/87/7366 259    • Max Heart Rate 07/07/2023 134    • Max Predicted Heart Rate 07/07/2023 149    • Reason for Termination 07/07/2023                      Value:Target Heart Rate Achieved  Maximal exercise (symptom limited)     • Test Indication 07/07/2023 SOB    • Target Hr Formular 07/07/2023 (220 - Age)*85%    • Chest Pain Statement 07/07/2023 none      Imaging: Echo complete w/ contrast if indicated    Result Date: 7/31/2023  Narrative: •  Left Ventricle: Left ventricular cavity size is normal. Wall thickness is mildly increased. The left ventricular ejection fraction is 40%. Systolic function is moderately reduced. Diastolic function is mildly abnormal, consistent with grade I (abnormal) relaxation. •  The following segments are hypokinetic: mid anteroseptal, mid inferoseptal, apical septal and apex. •  Mitral Valve: There is mild regurgitation.        Review of Systems:  Review of Systems   REVIEW OF SYSTEMS:  Constitutional:  Denies fever or chills   Eyes:  Denies change in visual acuity   HENT:  Denies nasal congestion or sore throat   Respiratory:   shortness of breath   Cardiovascular:  Denies chest pain or edema   GI:  Denies abdominal pain, nausea, vomiting, bloody stools or diarrhea   :  Denies dysuria, frequency, difficulty in micturition and nocturia  Musculoskeletal:  Denies back pain or joint pain Neurologic:  Denies headache, focal weakness or sensory changes   Endocrine:  Denies polyuria or polydipsia   Lymphatic:  Denies swollen glands   Psychiatric:  Denies depression or anxiety    Physical Exam:    /78 (BP Location: Left arm, Patient Position: Sitting, Cuff Size: Standard)   Pulse 66   Resp 16   Ht 5' 10" (1.778 m)   Wt 106 kg (233 lb)   SpO2 97%   BMI 33.43 kg/m²     Physical Exam   PHYSICAL EXAM:  General:  Patient is not in acute distress   Head: Normocephalic, Atraumatic. HEENT:  Both pupils normal-size atraumatic, normocephalic, nonicteric  Neck:  JVP not raised. Trachea central. No carotid bruit  Respiratory:  normal breath sounds no crackles. no rhonchi  Cardiovascular:  Regular rate and rhythm no S3 no murmurs  GI:  Abdomen soft nontender. No organomegaly. Lymphatic:  No cervical or inguinal lymphadenopathy  Neurologic:  Patient is awake alert, oriented . Grossly nonfocal  Extremities no edema    Exercise nuclear stress test July 2023    1. Good exercise tolerance-10.1 METS  2. Resting EKG suggesting anteroseptal scar  3. Negative EKG portion of stress test  4. There is a moderate-sized fixed apical defect of severe intensity indicating previous apical scar  5. There is a moderate intensity fixed septal defect indicating prior septal scar  6. There is a mild intensity small mid anterior wall reversible  defect most consistent with mid anterior wall ischemia. 7.  Apical hypokinesis and generalized hypokinesis. Calculated ejection fraction 44%        Discussion/Summary: This patient has known history of CAD status post MI status post PCI with mild cardiomyopathy. Patient did have symptoms of shortness of breath and had a stress test which showed anterior wall ischemia. Patient has multiple risk factors for coronary disease including strong family history as well as hypertension and hyperlipidemia.     Risks and benefits of cardiac catheterization and possible revascularization options including but not limited to risk of infection, bleeding, risk of myocardial infarction, stroke, and risk of death discussed at length with patient. Patient understands the risks and benefits and wishes to proceed. Cardiac catheterization will be scheduled . Preprocedure workup will be done. Further cardiac evaluation and management after the results of cardiac catheterization. Patient denies any history of dye or seafood allergy. AUC  1. Is this for pre-valve or open heart surgery request?  NO    2. Did the pt have a stress test? YES    3. Is pt on maximal antianginal medications? YES                4.Is pt asymptomatic? YES    5. Does pt having ischemic symptoms? YES                  If yes, is what type? Anginal equivalent? Follow-up in 2 months or earlier as needed. Patient is agreeable with the plan of care.

## 2023-08-18 ENCOUNTER — PREP FOR PROCEDURE (OUTPATIENT)
Dept: CARDIOLOGY CLINIC | Facility: CLINIC | Age: 73
End: 2023-08-18

## 2023-08-18 ENCOUNTER — TELEPHONE (OUTPATIENT)
Dept: CARDIOLOGY CLINIC | Facility: CLINIC | Age: 73
End: 2023-08-18

## 2023-08-18 DIAGNOSIS — R94.39 ABNORMAL STRESS TEST: ICD-10-CM

## 2023-08-18 DIAGNOSIS — I25.10 CORONARY ARTERY DISEASE INVOLVING NATIVE HEART WITHOUT ANGINA PECTORIS, UNSPECIFIED VESSEL OR LESION TYPE: Primary | ICD-10-CM

## 2023-08-18 NOTE — TELEPHONE ENCOUNTER
Can we get an auth for [facility] at [facility] on 9/6/23 thanks.

## 2023-08-18 NOTE — TELEPHONE ENCOUNTER
----- Message from Chantelle Seo MD sent at 8/17/2023 10:59 AM EDT -----  Regarding: Schedule cardiac catheterization at [facility]  Please schedule this patient for cardiac catheterization at [facility] after Labor Day. Patient was given slip for blood work. Patient denies any history of dye allergy. Thank you.

## 2023-08-23 ENCOUNTER — APPOINTMENT (OUTPATIENT)
Dept: LAB | Facility: HOSPITAL | Age: 73
End: 2023-08-23
Attending: INTERNAL MEDICINE
Payer: MEDICARE

## 2023-08-23 DIAGNOSIS — R94.39 ABNORMAL STRESS TEST: ICD-10-CM

## 2023-08-23 DIAGNOSIS — I25.10 CORONARY ARTERY DISEASE INVOLVING NATIVE HEART WITHOUT ANGINA PECTORIS, UNSPECIFIED VESSEL OR LESION TYPE: ICD-10-CM

## 2023-08-23 LAB
ANION GAP SERPL CALCULATED.3IONS-SCNC: 5 MMOL/L
BASOPHILS # BLD AUTO: 0.07 THOUSANDS/ÂΜL (ref 0–0.1)
BASOPHILS NFR BLD AUTO: 1 % (ref 0–1)
BUN SERPL-MCNC: 20 MG/DL (ref 5–25)
CALCIUM SERPL-MCNC: 9.4 MG/DL (ref 8.4–10.2)
CHLORIDE SERPL-SCNC: 106 MMOL/L (ref 96–108)
CO2 SERPL-SCNC: 25 MMOL/L (ref 21–32)
CREAT SERPL-MCNC: 0.86 MG/DL (ref 0.6–1.3)
EOSINOPHIL # BLD AUTO: 0.39 THOUSAND/ÂΜL (ref 0–0.61)
EOSINOPHIL NFR BLD AUTO: 7 % (ref 0–6)
ERYTHROCYTE [DISTWIDTH] IN BLOOD BY AUTOMATED COUNT: 12.5 % (ref 11.6–15.1)
GFR SERPL CREATININE-BSD FRML MDRD: 87 ML/MIN/1.73SQ M
GLUCOSE P FAST SERPL-MCNC: 108 MG/DL (ref 65–99)
HCT VFR BLD AUTO: 48.4 % (ref 36.5–49.3)
HGB BLD-MCNC: 17.3 G/DL (ref 12–17)
IMM GRANULOCYTES # BLD AUTO: 0.02 THOUSAND/UL (ref 0–0.2)
IMM GRANULOCYTES NFR BLD AUTO: 0 % (ref 0–2)
INR PPP: 1.04 (ref 0.84–1.19)
LYMPHOCYTES # BLD AUTO: 2.2 THOUSANDS/ÂΜL (ref 0.6–4.47)
LYMPHOCYTES NFR BLD AUTO: 37 % (ref 14–44)
MCH RBC QN AUTO: 31.5 PG (ref 26.8–34.3)
MCHC RBC AUTO-ENTMCNC: 35.7 G/DL (ref 31.4–37.4)
MCV RBC AUTO: 88 FL (ref 82–98)
MONOCYTES # BLD AUTO: 0.83 THOUSAND/ÂΜL (ref 0.17–1.22)
MONOCYTES NFR BLD AUTO: 14 % (ref 4–12)
NEUTROPHILS # BLD AUTO: 2.44 THOUSANDS/ÂΜL (ref 1.85–7.62)
NEUTS SEG NFR BLD AUTO: 41 % (ref 43–75)
NRBC BLD AUTO-RTO: 0 /100 WBCS
PLATELET # BLD AUTO: 173 THOUSANDS/UL (ref 149–390)
PMV BLD AUTO: 9.5 FL (ref 8.9–12.7)
POTASSIUM SERPL-SCNC: 4.2 MMOL/L (ref 3.5–5.3)
PROTHROMBIN TIME: 14.2 SECONDS (ref 11.6–14.5)
RBC # BLD AUTO: 5.49 MILLION/UL (ref 3.88–5.62)
SODIUM SERPL-SCNC: 136 MMOL/L (ref 135–147)
WBC # BLD AUTO: 5.95 THOUSAND/UL (ref 4.31–10.16)

## 2023-08-23 PROCEDURE — 85025 COMPLETE CBC W/AUTO DIFF WBC: CPT | Performed by: INTERNAL MEDICINE

## 2023-08-23 PROCEDURE — 85610 PROTHROMBIN TIME: CPT | Performed by: INTERNAL MEDICINE

## 2023-08-23 PROCEDURE — 36415 COLL VENOUS BLD VENIPUNCTURE: CPT | Performed by: INTERNAL MEDICINE

## 2023-08-23 PROCEDURE — 80048 BASIC METABOLIC PNL TOTAL CA: CPT | Performed by: INTERNAL MEDICINE

## 2023-08-23 NOTE — TELEPHONE ENCOUNTER
Mildred Dueñas is not required. Verified that patient has Medicare primary with a supplement secondary.

## 2023-09-06 ENCOUNTER — HOSPITAL ENCOUNTER (OUTPATIENT)
Facility: HOSPITAL | Age: 73
Setting detail: OUTPATIENT SURGERY
Discharge: HOME | End: 2023-09-06
Attending: INTERNAL MEDICINE | Admitting: INTERNAL MEDICINE
Payer: MEDICARE

## 2023-09-06 VITALS
BODY MASS INDEX: 33.14 KG/M2 | DIASTOLIC BLOOD PRESSURE: 80 MMHG | HEIGHT: 70 IN | RESPIRATION RATE: 16 BRPM | WEIGHT: 231.48 LBS | TEMPERATURE: 97.7 F | OXYGEN SATURATION: 96 % | SYSTOLIC BLOOD PRESSURE: 139 MMHG | HEART RATE: 66 BPM

## 2023-09-06 DIAGNOSIS — I25.10 CORONARY ARTERY DISEASE INVOLVING NATIVE HEART WITHOUT ANGINA PECTORIS, UNSPECIFIED VESSEL OR LESION TYPE: ICD-10-CM

## 2023-09-06 DIAGNOSIS — R94.39 ABNORMAL STRESS TEST: ICD-10-CM

## 2023-09-06 DIAGNOSIS — I25.10 CORONARY ARTERY DISEASE INVOLVING NATIVE HEART WITHOUT ANGINA PECTORIS, UNSPECIFIED VESSEL OR LESION TYPE: Primary | ICD-10-CM

## 2023-09-06 PROCEDURE — 93005 ELECTROCARDIOGRAM TRACING: CPT

## 2023-09-06 PROCEDURE — 99152 MOD SED SAME PHYS/QHP 5/>YRS: CPT | Performed by: INTERNAL MEDICINE

## 2023-09-06 PROCEDURE — 99153 MOD SED SAME PHYS/QHP EA: CPT | Performed by: INTERNAL MEDICINE

## 2023-09-06 PROCEDURE — 93454 CORONARY ARTERY ANGIO S&I: CPT | Performed by: INTERNAL MEDICINE

## 2023-09-06 PROCEDURE — C1769 GUIDE WIRE: HCPCS | Performed by: INTERNAL MEDICINE

## 2023-09-06 PROCEDURE — C1894 INTRO/SHEATH, NON-LASER: HCPCS | Performed by: INTERNAL MEDICINE

## 2023-09-06 RX ORDER — FENTANYL CITRATE 50 UG/ML
INJECTION, SOLUTION INTRAMUSCULAR; INTRAVENOUS CODE/TRAUMA/SEDATION MEDICATION
Status: DISCONTINUED | OUTPATIENT
Start: 2023-09-06 | End: 2023-09-06 | Stop reason: HOSPADM

## 2023-09-06 RX ORDER — HEPARIN SODIUM 1000 [USP'U]/ML
INJECTION, SOLUTION INTRAVENOUS; SUBCUTANEOUS CODE/TRAUMA/SEDATION MEDICATION
Status: DISCONTINUED | OUTPATIENT
Start: 2023-09-06 | End: 2023-09-06 | Stop reason: HOSPADM

## 2023-09-06 RX ORDER — MIDAZOLAM HYDROCHLORIDE 2 MG/2ML
INJECTION, SOLUTION INTRAMUSCULAR; INTRAVENOUS CODE/TRAUMA/SEDATION MEDICATION
Status: DISCONTINUED | OUTPATIENT
Start: 2023-09-06 | End: 2023-09-06 | Stop reason: HOSPADM

## 2023-09-06 RX ORDER — LIDOCAINE WITH 8.4% SOD BICARB 0.9%(10ML)
SYRINGE (ML) INJECTION CODE/TRAUMA/SEDATION MEDICATION
Status: DISCONTINUED | OUTPATIENT
Start: 2023-09-06 | End: 2023-09-06 | Stop reason: HOSPADM

## 2023-09-06 RX ORDER — SODIUM CHLORIDE 9 MG/ML
75 INJECTION, SOLUTION INTRAVENOUS CONTINUOUS
Status: DISPENSED | OUTPATIENT
Start: 2023-09-06 | End: 2023-09-06

## 2023-09-06 RX ORDER — NITROGLYCERIN 20 MG/100ML
INJECTION INTRAVENOUS CODE/TRAUMA/SEDATION MEDICATION
Status: DISCONTINUED | OUTPATIENT
Start: 2023-09-06 | End: 2023-09-06 | Stop reason: HOSPADM

## 2023-09-06 RX ORDER — SODIUM CHLORIDE 9 MG/ML
75 INJECTION, SOLUTION INTRAVENOUS CONTINUOUS
Status: DISCONTINUED | OUTPATIENT
Start: 2023-09-06 | End: 2023-09-06

## 2023-09-06 RX ADMIN — SODIUM CHLORIDE 75 ML/HR: 0.9 INJECTION, SOLUTION INTRAVENOUS at 08:31

## 2023-09-06 NOTE — INTERVAL H&P NOTE
Update: (This section must be completed if the H&P was completed greater than 24 hrs to procedure or admission)    H&P reviewed. After examining the patient, I find no changed to the H&P since it had been written. Has complaints of shortness of breath  Abnormal stress reviewed    I have discussed in detail with patient regarding the indications, alternatives, risks and benefit of cardiac catheterization and possible PCI. The procedure risks, benefits, and complications (including but not limited to bleeding, infection, arrhythmia, nephrotoxicity, vessel injury, myocardial infarction, CVA, and death) were reviewed. Patient is alert and oriented x3 and wishes to proceed. All questions answered. Patient re-evaluated.  Accept as history and physical.    Freddy Ramos MD/September 6, 2023/8:25 AM

## 2023-09-06 NOTE — Clinical Note
Prepped: groin and right radial. Prepped with: ChloraPrep. The site was clipped. The patient was draped.

## 2023-09-07 LAB
ATRIAL RATE: 58 BPM
P AXIS: 42 DEGREES
PR INTERVAL: 200 MS
QRS AXIS: -59 DEGREES
QRSD INTERVAL: 80 MS
QT INTERVAL: 398 MS
QTC INTERVAL: 390 MS
T WAVE AXIS: 81 DEGREES
VENTRICULAR RATE: 58 BPM

## 2023-09-07 PROCEDURE — 93010 ELECTROCARDIOGRAM REPORT: CPT | Performed by: INTERNAL MEDICINE

## 2023-09-08 ENCOUNTER — HOSPITAL ENCOUNTER (OUTPATIENT)
Dept: RADIOLOGY | Facility: HOSPITAL | Age: 73
End: 2023-09-08
Payer: MEDICARE

## 2023-09-08 ENCOUNTER — LAB REQUISITION (OUTPATIENT)
Dept: LAB | Facility: HOSPITAL | Age: 73
End: 2023-09-08
Payer: MEDICARE

## 2023-09-08 ENCOUNTER — OFFICE VISIT (OUTPATIENT)
Dept: CARDIAC SURGERY | Facility: CLINIC | Age: 73
End: 2023-09-08
Payer: MEDICARE

## 2023-09-08 ENCOUNTER — APPOINTMENT (OUTPATIENT)
Dept: LAB | Facility: HOSPITAL | Age: 73
End: 2023-09-08
Payer: MEDICARE

## 2023-09-08 VITALS
HEART RATE: 53 BPM | DIASTOLIC BLOOD PRESSURE: 79 MMHG | WEIGHT: 232.3 LBS | OXYGEN SATURATION: 97 % | SYSTOLIC BLOOD PRESSURE: 135 MMHG | TEMPERATURE: 97.7 F | HEIGHT: 70 IN | BODY MASS INDEX: 33.26 KG/M2

## 2023-09-08 DIAGNOSIS — R06.02 SHORTNESS OF BREATH: ICD-10-CM

## 2023-09-08 DIAGNOSIS — E78.2 COMBINED HYPERLIPIDEMIA: ICD-10-CM

## 2023-09-08 DIAGNOSIS — I21.A9 OTHER MYOCARDIAL INFARCTION TYPE (HCC): ICD-10-CM

## 2023-09-08 DIAGNOSIS — Z01.89 ENCOUNTER FOR IMAGING OF BILATERAL GREATER SAPHENOUS VEINS: ICD-10-CM

## 2023-09-08 DIAGNOSIS — I25.10 CORONARY ARTERY DISEASE INVOLVING NATIVE CORONARY ARTERY OF NATIVE HEART WITHOUT ANGINA PECTORIS: ICD-10-CM

## 2023-09-08 DIAGNOSIS — I10 HYPERTENSION, ESSENTIAL: ICD-10-CM

## 2023-09-08 DIAGNOSIS — Z01.818 ENCOUNTER FOR OTHER PREPROCEDURAL EXAMINATION: ICD-10-CM

## 2023-09-08 DIAGNOSIS — Z01.810 PRE-OPERATIVE CARDIOVASCULAR EXAMINATION: ICD-10-CM

## 2023-09-08 DIAGNOSIS — Z01.812 ENCOUNTER FOR PRE-OPERATIVE LABORATORY TESTING: ICD-10-CM

## 2023-09-08 DIAGNOSIS — I25.10 CORONARY ARTERY DISEASE INVOLVING NATIVE HEART WITHOUT ANGINA PECTORIS, UNSPECIFIED VESSEL OR LESION TYPE: ICD-10-CM

## 2023-09-08 DIAGNOSIS — Z13.6 ENCOUNTER FOR SCREENING FOR STENOSIS OF CAROTID ARTERY: ICD-10-CM

## 2023-09-08 DIAGNOSIS — I25.10 CORONARY ARTERY DISEASE INVOLVING NATIVE CORONARY ARTERY OF NATIVE HEART WITHOUT ANGINA PECTORIS: Primary | ICD-10-CM

## 2023-09-08 DIAGNOSIS — Z01.818 PRE-OP EVALUATION: ICD-10-CM

## 2023-09-08 LAB
ANION GAP SERPL CALCULATED.3IONS-SCNC: 7 MMOL/L
BUN SERPL-MCNC: 15 MG/DL (ref 5–25)
CALCIUM SERPL-MCNC: 9.8 MG/DL (ref 8.4–10.2)
CHLORIDE SERPL-SCNC: 105 MMOL/L (ref 96–108)
CO2 SERPL-SCNC: 24 MMOL/L (ref 21–32)
CREAT SERPL-MCNC: 0.84 MG/DL (ref 0.6–1.3)
EST. AVERAGE GLUCOSE BLD GHB EST-MCNC: 108 MG/DL
GFR SERPL CREATININE-BSD FRML MDRD: 88 ML/MIN/1.73SQ M
GLUCOSE P FAST SERPL-MCNC: 76 MG/DL (ref 65–99)
HBA1C MFR BLD: 5.4 %
POTASSIUM SERPL-SCNC: 4.1 MMOL/L (ref 3.5–5.3)
SODIUM SERPL-SCNC: 136 MMOL/L (ref 135–147)

## 2023-09-08 PROCEDURE — 36415 COLL VENOUS BLD VENIPUNCTURE: CPT

## 2023-09-08 PROCEDURE — 86901 BLOOD TYPING SEROLOGIC RH(D): CPT | Performed by: PHYSICIAN ASSISTANT

## 2023-09-08 PROCEDURE — 99205 OFFICE O/P NEW HI 60 MIN: CPT | Performed by: THORACIC SURGERY (CARDIOTHORACIC VASCULAR SURGERY)

## 2023-09-08 PROCEDURE — 80048 BASIC METABOLIC PNL TOTAL CA: CPT

## 2023-09-08 PROCEDURE — 86900 BLOOD TYPING SEROLOGIC ABO: CPT | Performed by: PHYSICIAN ASSISTANT

## 2023-09-08 PROCEDURE — 86850 RBC ANTIBODY SCREEN: CPT | Performed by: PHYSICIAN ASSISTANT

## 2023-09-08 PROCEDURE — 83036 HEMOGLOBIN GLYCOSYLATED A1C: CPT

## 2023-09-08 PROCEDURE — 71046 X-RAY EXAM CHEST 2 VIEWS: CPT

## 2023-09-08 RX ORDER — CEFAZOLIN SODIUM 2 G/50ML
2000 SOLUTION INTRAVENOUS ONCE
OUTPATIENT
Start: 2023-09-08 | End: 2023-09-08

## 2023-09-08 RX ORDER — CHLORHEXIDINE GLUCONATE 0.12 MG/ML
15 RINSE ORAL ONCE
OUTPATIENT
Start: 2023-09-08 | End: 2023-09-08

## 2023-09-08 RX ORDER — ATORVASTATIN CALCIUM 20 MG/1
TABLET, FILM COATED ORAL
Qty: 90 TABLET | Refills: 1 | Status: SHIPPED | OUTPATIENT
Start: 2023-09-08

## 2023-09-08 NOTE — PROGRESS NOTES
Consultation - Cardiothoracic Surgery   Daksha Chan 71 y.o. male MRN: [id number]    Physician Requesting Consult: Anne-Marie Griffin / Petr Cortse    Reason for Consult / Principal Problem: Coronary artery disease    History of Present Illness: Daksha Chan is a 71y.o. year old male with pmh notable for CAD s/p MI and PCI (2000, 2007, 2010), skin cancer, nephrotic syndrome, lumbar stenosis, HL, IFG, ERIC and HTN who presents to the office following abnormal stress testing and MVCAD on cardiac catheterization due to complaints of QUEZADA. He is now referred to the office for evaluation for CABG    He is here with his wife today in the office. He is a patient of Dr Petr Cortes since his first MI in 2000. He has a strong FH of CAD in his father and brother in their late 45s. He is an active man enjoys biking and being outside. He takes his medications without issues. He notes some SOB with exertion and overall fatigue at times. He is a retired  from [employer]. He has two older children. He tolerates all of his ADLs independently, no assist devices for daily use. 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      Past Surgical History:   Past Surgical History:   Procedure Laterality Date   • ATHERECTOMY      1 with stent placement  last assessed 6/17/14   • CARDIAC CATHETERIZATION      outcome: successful  last assessed 6/17/14   • CARDIAC CATHETERIZATION Left 9/6/2023    Procedure: Cardiac Left Heart Cath;  Surgeon: Freddy Ramos MD;  Location: [address] [place] [address] CATH LAB;   Service: Cardiology   • CARDIAC CATHETERIZATION N/A 9/6/2023    Procedure: Cardiac Coronary Angiogram;  Surgeon: Marguerite Beltran MD;  Location: [facility] CATH LAB; Service: Cardiology   • CARDIAC CATHETERIZATION  9/6/2023    Procedure: Cardiac catheterization;  Surgeon: Marguerite Beltran MD;  Location: [address] [facility] CATH LAB; Service: Cardiology   • CORONARY ANGIOPLASTY     • CORONARY ANGIOPLASTY WITH STENT PLACEMENT      to LAD, diagonal and RCA   • MALIGNANT SKIN LESION EXCISION  01/09/2012    Trunk,  SCC chest        Family History:  Family History   Problem Relation Age of Onset   • Heart attack Father 50   • Cancer Father    • Coronary artery disease Family        Social History:      Social History     Substance and Sexual Activity   Alcohol Use Not Currently   • Alcohol/week: 14.0 standard drinks of alcohol   • Types: 14 Glasses of wine per week    Comment: Wine consumption (__glasses/day)      Social History     Substance and Sexual Activity   Drug Use No     Social History     Tobacco Use   Smoking Status Never   Smokeless Tobacco Never       Home Medications:   Prior to Admission medications    Medication Sig Start Date End Date Taking? Authorizing Provider   aspirin 81 MG tablet Take 1 tablet by mouth daily    Historical Provider, MD   atorvastatin (LIPITOR) 20 mg tablet TAKE 1 TABLET BY MOUTH EVERY DAY 10/28/22   Dao Herrera MD   isosorbide mononitrate (IMDUR) 30 mg 24 hr tablet Take 1 tablet (30 mg total) by mouth daily 8/17/23   Dao Herrera MD   lisinopril (ZESTRIL) 20 mg tablet TAKE 1 TABLET BY MOUTH EVERY DAY 12/5/22   Faye Coto MD   metoprolol succinate (TOPROL-XL) 50 mg 24 hr tablet TAKE 1 TABLET BY MOUTH EVERY DAY 7/27/23   Dao Herrera MD   nitroglycerin (NITROSTAT) 0.4 mg SL tablet Place 1 tablet (0.4 mg total) under the tongue as needed for chest pain 7/18/23   Marguerite Beltran MD       Allergies:  No Known Allergies    Review of Systems:     Review of Systems   Constitutional: Positive for activity change and fatigue. HENT: Negative.     Eyes: Negative. Respiratory: Positive for shortness of breath. Cardiovascular: Negative. Gastrointestinal: Negative. Endocrine: Negative. Genitourinary: Negative. Musculoskeletal: Negative. Skin: Negative. Allergic/Immunologic: Negative. Neurological: Negative. Hematological: Negative. Psychiatric/Behavioral: Negative. Vital Signs:     Vitals:    09/08/23 0956 09/08/23 1001   BP: 132/78 135/79   BP Location: Left arm Right arm   Patient Position: Sitting Sitting   Cuff Size: Large Standard   Pulse: (!) 53    Temp: 97.7 °F (36.5 °C)    TempSrc: Tympanic    SpO2: 97%    Weight: 105 kg (232 lb 4.8 oz)    Height: 5' 10" (1.778 m)        Physical Exam:     Physical Exam  Constitutional:       General: He is not in acute distress. Appearance: He is normal weight. He is not ill-appearing or toxic-appearing. HENT:      Head: Normocephalic and atraumatic. Right Ear: External ear normal.      Left Ear: External ear normal.      Mouth/Throat:      Mouth: Mucous membranes are moist.      Pharynx: Oropharynx is clear. Eyes:      General: No scleral icterus. Right eye: No discharge. Left eye: No discharge. Extraocular Movements: Extraocular movements intact. Conjunctiva/sclera: Conjunctivae normal.      Pupils: Pupils are equal, round, and reactive to light. Neck:      Vascular: No carotid bruit. Cardiovascular:      Rate and Rhythm: Normal rate and regular rhythm. Pulses: Normal pulses. Heart sounds: No murmur heard. Pulmonary:      Effort: Pulmonary effort is normal. No respiratory distress. Breath sounds: Normal breath sounds. No wheezing or rales. Abdominal:      General: Bowel sounds are normal. There is no distension. Palpations: Abdomen is soft. Tenderness: There is no abdominal tenderness. There is no guarding. Musculoskeletal:      Cervical back: Normal range of motion and neck supple. Right lower leg: No edema. Left lower leg: No edema. Skin:     General: Skin is warm and dry. Neurological:      General: No focal deficit present. Mental Status: He is alert and oriented to person, place, and time. Cranial Nerves: No cranial nerve deficit. Sensory: No sensory deficit. Coordination: Coordination normal.   Psychiatric:         Mood and Affect: Mood normal.         Behavior: Behavior normal.         Thought Content: Thought content normal.         Judgment: Judgment normal.         Imaging Studies:     Cardiac Catheterization:   Left Main   The vessel was visualized by angiography, is large and is angiographically normal.      Left Anterior Descending   The vessel was visualized by angiography and is large. Prox LAD lesion is 55% stenosed. ALPHONSO flow is 3. The lesion was previously treated using a stent of unknown type. Mid LAD lesion is 85% stenosed. ALPHONSO flow is 3. First Diagonal Branch   The vessel is moderate in size. 1st Diag lesion is 70% stenosed. The lesion was previously treated using a stent of unknown type. Second Diagonal Branch   The vessel is moderate in size. 2nd Diag lesion is 100% stenosed. The lesion is chronically occluded. There are faint left to left collaterals visualized. Left Circumflex   The vessel was visualized by angiography, is large, is angiographically normal and non-dominant. First Obtuse Marginal Branch   The vessel is moderate in size. The vessel exhibits minimal luminal irregularities. Second Obtuse Marginal Branch   The vessel is moderate in size and is angiographically normal.      Third Obtuse Marginal Branch   The vessel is moderate in size. The vessel exhibits minimal luminal irregularities. Right Coronary Artery   The vessel was visualized by angiography, is large and dominant. There is moderate diffuse disease throughout the vessel. Prox RCA lesion is 75% stenosed. ALPHONSO flow is 3. The lesion is eccentric.  The lesion is calcified. The lesion was previously treated using a stent of unknown type. Dist RCA lesion is 75% stenosed. Right Posterior Descending Artery   The vessel is large. RPDA lesion is 80% stenosed. ALPHONSO flow is 3.          Echocardiogram:   Findings    Left Ventricle Left ventricular cavity size is normal. Wall thickness is mildly increased. The left ventricular ejection fraction is 40%. Systolic function is moderately reduced. Diastolic function is mildly abnormal, consistent with grade I (abnormal) relaxation. Wall Scoring Baseline     The following segments are hypokinetic: mid anteroseptal, mid inferoseptal, apical septal and apex. All other segments are normal.               Right Ventricle Right ventricular cavity size is normal. Systolic function is normal. Wall thickness is normal.      Left Atrium The atrium is normal in size. Right Atrium The atrium is normal in size. Aortic Valve The aortic valve is trileaflet. The leaflets are not thickened. The leaflets are mildly calcified. The leaflets exhibit normal mobility. There is no evidence of regurgitation. The aortic valve has no significant stenosis. Mitral Valve Mitral valve structure is normal. There is mild regurgitation. There is no evidence of stenosis. Tricuspid Valve Tricuspid valve structure is normal. There is trace regurgitation. There is no evidence of stenosis. Pulmonic Valve Pulmonic valve structure is normal. There is trace regurgitation. There is no evidence of stenosis. Ascending Aorta The aortic root is normal in size. IVC/SVC The inferior vena cava is normal in size. Pericardium There is no pericardial effusion. The pericardium is normal in appearance. I have personally reviewed pertinent reports.       Assessment:  Patient Active Problem List    Diagnosis Date Noted   • ERIC (obstructive sleep apnea) 09/25/2019   • Seborrheic keratosis 08/16/2018   • Screening for skin condition 08/16/2018   • History of skin cancer 08/16/2018   • Impaired fasting glucose 07/24/2018   • Coronary artery disease involving native coronary artery of native heart without angina pectoris 02/06/2018   • Hypertension, essential 02/06/2018   • Combined hyperlipidemia 02/06/2018     Severe coronary artery disease; Ongoing CABG workup    Plan:  Risks and benefits of coronary artery bypass grafting were discussed in detail today with the patient. They understand and wish to proceed with further workup and ultimately surgical intervention. We have ordered routine preoperative laboratory and vascular studies. Pending the results of these tests, they will be scheduled for surgery with Dr. Marito Murillo M.D. Jossy Scott was comfortable with our recommendations, and their questions were answered to their satisfaction. Thank you for allowing us to participate in the care of this patient. The patient recently had a screening colonoscopy in 2013. Therefore GI referral is not indicated at this time. -- he has a script to schedule his routine colonoscopy at home. SIGNATURE: Fortunato Bolivar, [place]  DATE: 09/08/23  TIME: 10:27 AM    * This note was completed in part utilizing [employer] direct voice recognition software. Grammatical errors, random word insertion, spelling mistakes, and incomplete sentences may be an occasional consequence of the system secondary to software limitations, ambient noise and hardware issues. At the time of dictation, efforts were made to edit, clarify and /or correct errors. Please read the chart carefully and recognize, using context, where substitutions have occurred. If you have any questions or concerns about the context, text or information contained within the body of this dictation, please contact myself, the provider, for further clarification.

## 2023-09-08 NOTE — H&P
Consultation - Cardiothoracic Surgery   Perfecto Rivero 71 y.o. male MRN: [id number]    Physician Requesting Consult: Chula Hinojosa / Lei Richardson    Reason for Consult / Principal Problem: Coronary artery disease    History of Present Illness: Perfecto Rivero is a 71y.o. year old male with pmh notable for CAD s/p MI and PCI (2000, 2007, 2010), skin cancer, nephrotic syndrome, lumbar stenosis, HL, IFG, ERIC and HTN who presents to the office following abnormal stress testing and MVCAD on cardiac catheterization due to complaints of QUEZADA. He is now referred to the office for evaluation for CABG    He is here with his wife today in the office. He is a patient of Dr Lei Richardson since his first MI in 2000. He has a strong FH of CAD in his father and brother in their late 45s. He is an active man enjoys biking and being outside. He takes his medications without issues. He notes some SOB with exertion and overall fatigue at times. He is a retired  from [employer]. He has two older children. He tolerates all of his ADLs independently, no assist devices for daily use. 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      Past Surgical History:   Past Surgical History:   Procedure Laterality Date   • ATHERECTOMY      1 with stent placement  last assessed 6/17/14   • CARDIAC CATHETERIZATION      outcome: successful  last assessed 6/17/14   • CARDIAC CATHETERIZATION Left 9/6/2023    Procedure: Cardiac Left Heart Cath;  Surgeon: Zo Goldberg MD;  Location: [address] [place] [address] CATH LAB;   Service: Cardiology   • CARDIAC CATHETERIZATION N/A 9/6/2023    Procedure: Cardiac Coronary Angiogram;  Surgeon: Isa Brown MD;  Location: [facility] CATH LAB; Service: Cardiology   • CARDIAC CATHETERIZATION  9/6/2023    Procedure: Cardiac catheterization;  Surgeon: Isa Brown MD;  Location: [facility] CATH LAB; Service: Cardiology   • CORONARY ANGIOPLASTY     • CORONARY ANGIOPLASTY WITH STENT PLACEMENT      to LAD, diagonal and RCA   • MALIGNANT SKIN LESION EXCISION  01/09/2012    Trunk,  SCC chest        Family History:  Family History   Problem Relation Age of Onset   • Heart attack Father 50   • Cancer Father    • Coronary artery disease Family        Social History:      Social History     Substance and Sexual Activity   Alcohol Use Not Currently   • Alcohol/week: 14.0 standard drinks of alcohol   • Types: 14 Glasses of wine per week    Comment: Wine consumption (__glasses/day)      Social History     Substance and Sexual Activity   Drug Use No     Social History     Tobacco Use   Smoking Status Never   Smokeless Tobacco Never       Home Medications:   Prior to Admission medications    Medication Sig Start Date End Date Taking? Authorizing Provider   aspirin 81 MG tablet Take 1 tablet by mouth daily    Historical Provider, MD   atorvastatin (LIPITOR) 20 mg tablet TAKE 1 TABLET BY MOUTH EVERY DAY 10/28/22   Edgardo Guillermo MD   isosorbide mononitrate (IMDUR) 30 mg 24 hr tablet Take 1 tablet (30 mg total) by mouth daily 8/17/23   Edgardo Guillermo MD   lisinopril (ZESTRIL) 20 mg tablet TAKE 1 TABLET BY MOUTH EVERY DAY 12/5/22   Derick Santillan MD   metoprolol succinate (TOPROL-XL) 50 mg 24 hr tablet TAKE 1 TABLET BY MOUTH EVERY DAY 7/27/23   Edgardo Guillermo MD   nitroglycerin (NITROSTAT) 0.4 mg SL tablet Place 1 tablet (0.4 mg total) under the tongue as needed for chest pain 7/18/23   Isa Brown MD       Allergies:  No Known Allergies    Review of Systems:     Review of Systems   Constitutional: Positive for activity change and fatigue. HENT: Negative.     Eyes: Negative. Respiratory: Positive for shortness of breath. Cardiovascular: Negative. Gastrointestinal: Negative. Endocrine: Negative. Genitourinary: Negative. Musculoskeletal: Negative. Skin: Negative. Allergic/Immunologic: Negative. Neurological: Negative. Hematological: Negative. Psychiatric/Behavioral: Negative. Vital Signs:     Vitals:    09/08/23 0956 09/08/23 1001   BP: 132/78 135/79   BP Location: Left arm Right arm   Patient Position: Sitting Sitting   Cuff Size: Large Standard   Pulse: (!) 53    Temp: 97.7 °F (36.5 °C)    TempSrc: Tympanic    SpO2: 97%    Weight: 105 kg (232 lb 4.8 oz)    Height: 5' 10" (1.778 m)        Physical Exam:     Physical Exam  Constitutional:       General: He is not in acute distress. Appearance: He is normal weight. He is not ill-appearing or toxic-appearing. HENT:      Head: Normocephalic and atraumatic. Right Ear: External ear normal.      Left Ear: External ear normal.      Mouth/Throat:      Mouth: Mucous membranes are moist.      Pharynx: Oropharynx is clear. Eyes:      General: No scleral icterus. Right eye: No discharge. Left eye: No discharge. Extraocular Movements: Extraocular movements intact. Conjunctiva/sclera: Conjunctivae normal.      Pupils: Pupils are equal, round, and reactive to light. Neck:      Vascular: No carotid bruit. Cardiovascular:      Rate and Rhythm: Normal rate and regular rhythm. Pulses: Normal pulses. Heart sounds: No murmur heard. Pulmonary:      Effort: Pulmonary effort is normal. No respiratory distress. Breath sounds: Normal breath sounds. No wheezing or rales. Abdominal:      General: Bowel sounds are normal. There is no distension. Palpations: Abdomen is soft. Tenderness: There is no abdominal tenderness. There is no guarding. Musculoskeletal:      Cervical back: Normal range of motion and neck supple. Right lower leg: No edema. Left lower leg: No edema. Skin:     General: Skin is warm and dry. Neurological:      General: No focal deficit present. Mental Status: He is alert and oriented to person, place, and time. Cranial Nerves: No cranial nerve deficit. Sensory: No sensory deficit. Coordination: Coordination normal.   Psychiatric:         Mood and Affect: Mood normal.         Behavior: Behavior normal.         Thought Content: Thought content normal.         Judgment: Judgment normal.         Imaging Studies:     Cardiac Catheterization:   Left Main   The vessel was visualized by angiography, is large and is angiographically normal.      Left Anterior Descending   The vessel was visualized by angiography and is large. Prox LAD lesion is 55% stenosed. ALPHONSO flow is 3. The lesion was previously treated using a stent of unknown type. Mid LAD lesion is 85% stenosed. ALPHONSO flow is 3. First Diagonal Branch   The vessel is moderate in size. 1st Diag lesion is 70% stenosed. The lesion was previously treated using a stent of unknown type. Second Diagonal Branch   The vessel is moderate in size. 2nd Diag lesion is 100% stenosed. The lesion is chronically occluded. There are faint left to left collaterals visualized. Left Circumflex   The vessel was visualized by angiography, is large, is angiographically normal and non-dominant. First Obtuse Marginal Branch   The vessel is moderate in size. The vessel exhibits minimal luminal irregularities. Second Obtuse Marginal Branch   The vessel is moderate in size and is angiographically normal.      Third Obtuse Marginal Branch   The vessel is moderate in size. The vessel exhibits minimal luminal irregularities. Right Coronary Artery   The vessel was visualized by angiography, is large and dominant. There is moderate diffuse disease throughout the vessel. Prox RCA lesion is 75% stenosed. ALPHONSO flow is 3. The lesion is eccentric.  The lesion is calcified. The lesion was previously treated using a stent of unknown type. Dist RCA lesion is 75% stenosed. Right Posterior Descending Artery   The vessel is large. RPDA lesion is 80% stenosed. ALPHONSO flow is 3.          Echocardiogram:   Findings    Left Ventricle Left ventricular cavity size is normal. Wall thickness is mildly increased. The left ventricular ejection fraction is 40%. Systolic function is moderately reduced. Diastolic function is mildly abnormal, consistent with grade I (abnormal) relaxation. Wall Scoring Baseline     The following segments are hypokinetic: mid anteroseptal, mid inferoseptal, apical septal and apex. All other segments are normal.               Right Ventricle Right ventricular cavity size is normal. Systolic function is normal. Wall thickness is normal.      Left Atrium The atrium is normal in size. Right Atrium The atrium is normal in size. Aortic Valve The aortic valve is trileaflet. The leaflets are not thickened. The leaflets are mildly calcified. The leaflets exhibit normal mobility. There is no evidence of regurgitation. The aortic valve has no significant stenosis. Mitral Valve Mitral valve structure is normal. There is mild regurgitation. There is no evidence of stenosis. Tricuspid Valve Tricuspid valve structure is normal. There is trace regurgitation. There is no evidence of stenosis. Pulmonic Valve Pulmonic valve structure is normal. There is trace regurgitation. There is no evidence of stenosis. Ascending Aorta The aortic root is normal in size. IVC/SVC The inferior vena cava is normal in size. Pericardium There is no pericardial effusion. The pericardium is normal in appearance. I have personally reviewed pertinent reports.       Assessment:  Patient Active Problem List    Diagnosis Date Noted   • ERIC (obstructive sleep apnea) 09/25/2019   • Seborrheic keratosis 08/16/2018   • Screening for skin condition 08/16/2018   • History of skin cancer 08/16/2018   • Impaired fasting glucose 07/24/2018   • Coronary artery disease involving native coronary artery of native heart without angina pectoris 02/06/2018   • Hypertension, essential 02/06/2018   • Combined hyperlipidemia 02/06/2018     Severe coronary artery disease; Ongoing CABG workup    Plan:  Risks and benefits of coronary artery bypass grafting were discussed in detail today with the patient. They understand and wish to proceed with further workup and ultimately surgical intervention. We have ordered routine preoperative laboratory and vascular studies. Pending the results of these tests, they will be scheduled for surgery with Dr. Marito Murillo M.D. Jossy Scott was comfortable with our recommendations, and their questions were answered to their satisfaction. Thank you for allowing us to participate in the care of this patient. The patient recently had a screening colonoscopy in 2013. Therefore GI referral is not indicated at this time. -- he has a script to schedule his routine colonoscopy at home. SIGNATURE: Fortunato Bolivar, [place]  DATE: 09/08/23  TIME: 10:27 AM    * This note was completed in part utilizing [employer] direct voice recognition software. Grammatical errors, random word insertion, spelling mistakes, and incomplete sentences may be an occasional consequence of the system secondary to software limitations, ambient noise and hardware issues. At the time of dictation, efforts were made to edit, clarify and /or correct errors. Please read the chart carefully and recognize, using context, where substitutions have occurred. If you have any questions or concerns about the context, text or information contained within the body of this dictation, please contact myself, the provider, for further clarification.

## 2023-09-08 NOTE — H&P (VIEW-ONLY)
Consultation - Cardiothoracic Surgery   Daksha Chna 71 y.o. male MRN: [id number]    Physician Requesting Consult: Anne-Marie Griffin / Petr Cortes    Reason for Consult / Principal Problem: Coronary artery disease    History of Present Illness: Daksha Chan is a 71y.o. year old male with pmh notable for CAD s/p MI and PCI (2000, 2007, 2010), skin cancer, nephrotic syndrome, lumbar stenosis, HL, IFG, ERIC and HTN who presents to the office following abnormal stress testing and MVCAD on cardiac catheterization due to complaints of QUEZADA. He is now referred to the office for evaluation for CABG    He is here with his wife today in the office. He is a patient of Dr Petr Cortes since his first MI in 2000. He has a strong FH of CAD in his father and brother in their late 45s. He is an active man enjoys biking and being outside. He takes his medications without issues. He notes some SOB with exertion and overall fatigue at times. He is a retired  from [employer]. He has two older children. He tolerates all of his ADLs independently, no assist devices for daily use. 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      Past Surgical History:   Past Surgical History:   Procedure Laterality Date   • ATHERECTOMY      1 with stent placement  last assessed 6/17/14   • CARDIAC CATHETERIZATION      outcome: successful  last assessed 6/17/14   • CARDIAC CATHETERIZATION Left 9/6/2023    Procedure: Cardiac Left Heart Cath;  Surgeon: Freddy Ramos MD;  Location: [address] [place] [address] CATH LAB;   Service: Cardiology   • CARDIAC CATHETERIZATION N/A 9/6/2023    Procedure: Cardiac Coronary Angiogram;  Surgeon: Zo Goldberg MD;  Location: [facility] CATH LAB; Service: Cardiology   • CARDIAC CATHETERIZATION  9/6/2023    Procedure: Cardiac catheterization;  Surgeon: Zo Goldberg MD;  Location: [facility] CATH LAB; Service: Cardiology   • CORONARY ANGIOPLASTY     • CORONARY ANGIOPLASTY WITH STENT PLACEMENT      to LAD, diagonal and RCA   • MALIGNANT SKIN LESION EXCISION  01/09/2012    Trunk,  SCC chest        Family History:  Family History   Problem Relation Age of Onset   • Heart attack Father 50   • Cancer Father    • Coronary artery disease Family        Social History:      Social History     Substance and Sexual Activity   Alcohol Use Not Currently   • Alcohol/week: 14.0 standard drinks of alcohol   • Types: 14 Glasses of wine per week    Comment: Wine consumption (__glasses/day)      Social History     Substance and Sexual Activity   Drug Use No     Social History     Tobacco Use   Smoking Status Never   Smokeless Tobacco Never       Home Medications:   Prior to Admission medications    Medication Sig Start Date End Date Taking? Authorizing Provider   aspirin 81 MG tablet Take 1 tablet by mouth daily    Historical Provider, MD   atorvastatin (LIPITOR) 20 mg tablet TAKE 1 TABLET BY MOUTH EVERY DAY 10/28/22   Nile Sanz MD   isosorbide mononitrate (IMDUR) 30 mg 24 hr tablet Take 1 tablet (30 mg total) by mouth daily 8/17/23   Nile Sanz MD   lisinopril (ZESTRIL) 20 mg tablet TAKE 1 TABLET BY MOUTH EVERY DAY 12/5/22   Ac Toney MD   metoprolol succinate (TOPROL-XL) 50 mg 24 hr tablet TAKE 1 TABLET BY MOUTH EVERY DAY 7/27/23   Nile Sanz MD   nitroglycerin (NITROSTAT) 0.4 mg SL tablet Place 1 tablet (0.4 mg total) under the tongue as needed for chest pain 7/18/23   Zo Goldberg MD       Allergies:  No Known Allergies    Review of Systems:     Review of Systems   Constitutional: Positive for activity change and fatigue. HENT: Negative.     Eyes: Negative. Respiratory: Positive for shortness of breath. Cardiovascular: Negative. Gastrointestinal: Negative. Endocrine: Negative. Genitourinary: Negative. Musculoskeletal: Negative. Skin: Negative. Allergic/Immunologic: Negative. Neurological: Negative. Hematological: Negative. Psychiatric/Behavioral: Negative. Vital Signs:     Vitals:    09/08/23 0956 09/08/23 1001   BP: 132/78 135/79   BP Location: Left arm Right arm   Patient Position: Sitting Sitting   Cuff Size: Large Standard   Pulse: (!) 53    Temp: 97.7 °F (36.5 °C)    TempSrc: Tympanic    SpO2: 97%    Weight: 105 kg (232 lb 4.8 oz)    Height: 5' 10" (1.778 m)        Physical Exam:     Physical Exam  Constitutional:       General: He is not in acute distress. Appearance: He is normal weight. He is not ill-appearing or toxic-appearing. HENT:      Head: Normocephalic and atraumatic. Right Ear: External ear normal.      Left Ear: External ear normal.      Mouth/Throat:      Mouth: Mucous membranes are moist.      Pharynx: Oropharynx is clear. Eyes:      General: No scleral icterus. Right eye: No discharge. Left eye: No discharge. Extraocular Movements: Extraocular movements intact. Conjunctiva/sclera: Conjunctivae normal.      Pupils: Pupils are equal, round, and reactive to light. Neck:      Vascular: No carotid bruit. Cardiovascular:      Rate and Rhythm: Normal rate and regular rhythm. Pulses: Normal pulses. Heart sounds: No murmur heard. Pulmonary:      Effort: Pulmonary effort is normal. No respiratory distress. Breath sounds: Normal breath sounds. No wheezing or rales. Abdominal:      General: Bowel sounds are normal. There is no distension. Palpations: Abdomen is soft. Tenderness: There is no abdominal tenderness. There is no guarding. Musculoskeletal:      Cervical back: Normal range of motion and neck supple. Right lower leg: No edema. Left lower leg: No edema. Skin:     General: Skin is warm and dry. Neurological:      General: No focal deficit present. Mental Status: He is alert and oriented to person, place, and time. Cranial Nerves: No cranial nerve deficit. Sensory: No sensory deficit. Coordination: Coordination normal.   Psychiatric:         Mood and Affect: Mood normal.         Behavior: Behavior normal.         Thought Content: Thought content normal.         Judgment: Judgment normal.         Imaging Studies:     Cardiac Catheterization:   Left Main   The vessel was visualized by angiography, is large and is angiographically normal.      Left Anterior Descending   The vessel was visualized by angiography and is large. Prox LAD lesion is 55% stenosed. ALPHONSO flow is 3. The lesion was previously treated using a stent of unknown type. Mid LAD lesion is 85% stenosed. ALPHONSO flow is 3. First Diagonal Branch   The vessel is moderate in size. 1st Diag lesion is 70% stenosed. The lesion was previously treated using a stent of unknown type. Second Diagonal Branch   The vessel is moderate in size. 2nd Diag lesion is 100% stenosed. The lesion is chronically occluded. There are faint left to left collaterals visualized. Left Circumflex   The vessel was visualized by angiography, is large, is angiographically normal and non-dominant. First Obtuse Marginal Branch   The vessel is moderate in size. The vessel exhibits minimal luminal irregularities. Second Obtuse Marginal Branch   The vessel is moderate in size and is angiographically normal.      Third Obtuse Marginal Branch   The vessel is moderate in size. The vessel exhibits minimal luminal irregularities. Right Coronary Artery   The vessel was visualized by angiography, is large and dominant. There is moderate diffuse disease throughout the vessel. Prox RCA lesion is 75% stenosed. ALPHONSO flow is 3. The lesion is eccentric.  The lesion is calcified. The lesion was previously treated using a stent of unknown type. Dist RCA lesion is 75% stenosed. Right Posterior Descending Artery   The vessel is large. RPDA lesion is 80% stenosed. ALPHONSO flow is 3.          Echocardiogram:   Findings    Left Ventricle Left ventricular cavity size is normal. Wall thickness is mildly increased. The left ventricular ejection fraction is 40%. Systolic function is moderately reduced. Diastolic function is mildly abnormal, consistent with grade I (abnormal) relaxation. Wall Scoring Baseline     The following segments are hypokinetic: mid anteroseptal, mid inferoseptal, apical septal and apex. All other segments are normal.               Right Ventricle Right ventricular cavity size is normal. Systolic function is normal. Wall thickness is normal.      Left Atrium The atrium is normal in size. Right Atrium The atrium is normal in size. Aortic Valve The aortic valve is trileaflet. The leaflets are not thickened. The leaflets are mildly calcified. The leaflets exhibit normal mobility. There is no evidence of regurgitation. The aortic valve has no significant stenosis. Mitral Valve Mitral valve structure is normal. There is mild regurgitation. There is no evidence of stenosis. Tricuspid Valve Tricuspid valve structure is normal. There is trace regurgitation. There is no evidence of stenosis. Pulmonic Valve Pulmonic valve structure is normal. There is trace regurgitation. There is no evidence of stenosis. Ascending Aorta The aortic root is normal in size. IVC/SVC The inferior vena cava is normal in size. Pericardium There is no pericardial effusion. The pericardium is normal in appearance. I have personally reviewed pertinent reports.       Assessment:  Patient Active Problem List    Diagnosis Date Noted   • ERIC (obstructive sleep apnea) 09/25/2019   • Seborrheic keratosis 08/16/2018   • Screening for skin condition 08/16/2018   • History of skin cancer 08/16/2018   • Impaired fasting glucose 07/24/2018   • Coronary artery disease involving native coronary artery of native heart without angina pectoris 02/06/2018   • Hypertension, essential 02/06/2018   • Combined hyperlipidemia 02/06/2018     Severe coronary artery disease; Ongoing CABG workup    Plan:  Risks and benefits of coronary artery bypass grafting were discussed in detail today with the patient. They understand and wish to proceed with further workup and ultimately surgical intervention. We have ordered routine preoperative laboratory and vascular studies. Pending the results of these tests, they will be scheduled for surgery with Dr. Enedelia Khan M.D. Lorrie Yamile was comfortable with our recommendations, and their questions were answered to their satisfaction. Thank you for allowing us to participate in the care of this patient. The patient recently had a screening colonoscopy in 2013. Therefore GI referral is not indicated at this time. -- he has a script to schedule his routine colonoscopy at home. SIGNATURE: [address] [place]  DATE: 09/08/23  TIME: 10:27 AM    * This note was completed in part utilizing [employer] direct voice recognition software. Grammatical errors, random word insertion, spelling mistakes, and incomplete sentences may be an occasional consequence of the system secondary to software limitations, ambient noise and hardware issues. At the time of dictation, efforts were made to edit, clarify and /or correct errors. Please read the chart carefully and recognize, using context, where substitutions have occurred. If you have any questions or concerns about the context, text or information contained within the body of this dictation, please contact myself, the provider, for further clarification.

## 2023-09-09 LAB
ABO GROUP BLD: NORMAL
BLD GP AB SCN SERPL QL: NEGATIVE
RH BLD: NEGATIVE
SPECIMEN EXPIRATION DATE: NORMAL

## 2023-09-13 ENCOUNTER — HOSPITAL ENCOUNTER (OUTPATIENT)
Dept: VASCULAR ULTRASOUND | Facility: HOSPITAL | Age: 73
Discharge: HOME | End: 2023-09-13
Payer: MEDICARE

## 2023-09-13 DIAGNOSIS — Z01.810 PRE-OPERATIVE CARDIOVASCULAR EXAMINATION: ICD-10-CM

## 2023-09-13 DIAGNOSIS — E78.2 COMBINED HYPERLIPIDEMIA: ICD-10-CM

## 2023-09-13 DIAGNOSIS — R06.02 SHORTNESS OF BREATH: ICD-10-CM

## 2023-09-13 DIAGNOSIS — I25.10 CORONARY ARTERY DISEASE INVOLVING NATIVE CORONARY ARTERY OF NATIVE HEART WITHOUT ANGINA PECTORIS: ICD-10-CM

## 2023-09-13 DIAGNOSIS — I10 HYPERTENSION, ESSENTIAL: ICD-10-CM

## 2023-09-13 DIAGNOSIS — Z13.6 ENCOUNTER FOR SCREENING FOR STENOSIS OF CAROTID ARTERY: ICD-10-CM

## 2023-09-13 DIAGNOSIS — Z01.89 ENCOUNTER FOR IMAGING OF BILATERAL GREATER SAPHENOUS VEINS: ICD-10-CM

## 2023-09-13 PROCEDURE — 93971 EXTREMITY STUDY: CPT | Performed by: SURGERY

## 2023-09-13 PROCEDURE — 93880 EXTRACRANIAL BILAT STUDY: CPT

## 2023-09-13 PROCEDURE — 93971 EXTREMITY STUDY: CPT

## 2023-09-13 PROCEDURE — 93880 EXTRACRANIAL BILAT STUDY: CPT | Performed by: SURGERY

## 2023-09-19 ENCOUNTER — ANESTHESIA EVENT (INPATIENT)
Dept: PERIOP | Facility: HOSPITAL | Age: 73
End: 2023-09-19
Payer: MEDICARE

## 2023-09-20 ENCOUNTER — ANESTHESIA (INPATIENT)
Dept: PERIOP | Facility: HOSPITAL | Age: 73
End: 2023-09-20
Payer: MEDICARE

## 2023-09-20 ENCOUNTER — HOSPITAL ENCOUNTER (INPATIENT)
Facility: HOSPITAL | Age: 73
LOS: 4 days | Discharge: HOME HEALTH | DRG: 235 | End: 2023-09-24
Attending: THORACIC SURGERY (CARDIOTHORACIC VASCULAR SURGERY) | Admitting: THORACIC SURGERY (CARDIOTHORACIC VASCULAR SURGERY)
Payer: MEDICARE

## 2023-09-20 ENCOUNTER — APPOINTMENT (OUTPATIENT)
Dept: NON INVASIVE DIAGNOSTICS | Facility: HOSPITAL | Age: 73
DRG: 235 | End: 2023-09-20
Attending: THORACIC SURGERY (CARDIOTHORACIC VASCULAR SURGERY)
Payer: MEDICARE

## 2023-09-20 ENCOUNTER — APPOINTMENT (INPATIENT)
Dept: RADIOLOGY | Facility: HOSPITAL | Age: 73
DRG: 235 | End: 2023-09-20
Payer: MEDICARE

## 2023-09-20 DIAGNOSIS — I25.10 CORONARY ARTERY DISEASE INVOLVING NATIVE CORONARY ARTERY OF NATIVE HEART WITHOUT ANGINA PECTORIS: Primary | ICD-10-CM

## 2023-09-20 DIAGNOSIS — Z95.1 S/P CABG X 2: ICD-10-CM

## 2023-09-20 DIAGNOSIS — I48.0 PAROXYSMAL A-FIB (HCC): ICD-10-CM

## 2023-09-20 DIAGNOSIS — I25.10 CAD IN NATIVE ARTERY: ICD-10-CM

## 2023-09-20 LAB
ABO GROUP BLD: NORMAL
ANION GAP SERPL CALCULATED.3IONS-SCNC: 5 MMOL/L
ATRIAL RATE: 100 BPM
ATRIAL RATE: 74 BPM
BASE EXCESS BLDA CALC-SCNC: -2 MMOL/L (ref -2–3)
BASE EXCESS BLDA CALC-SCNC: -2 MMOL/L (ref -2–3)
BASE EXCESS BLDA CALC-SCNC: -3 MMOL/L (ref -2–3)
BASE EXCESS BLDA CALC-SCNC: -4 MMOL/L (ref -2–3)
BASE EXCESS BLDA CALC-SCNC: -5 MMOL/L (ref -2–3)
BASE EXCESS BLDA CALC-SCNC: -5 MMOL/L (ref -2–3)
BUN SERPL-MCNC: 12 MG/DL (ref 5–25)
CA-I BLD-SCNC: 0.96 MMOL/L (ref 1.12–1.32)
CA-I BLD-SCNC: 1.03 MMOL/L (ref 1.12–1.32)
CA-I BLD-SCNC: 1.14 MMOL/L (ref 1.12–1.32)
CA-I BLD-SCNC: 1.18 MMOL/L (ref 1.12–1.32)
CA-I BLD-SCNC: 1.19 MMOL/L (ref 1.12–1.32)
CA-I BLD-SCNC: 1.24 MMOL/L (ref 1.12–1.32)
CALCIUM SERPL-MCNC: 8 MG/DL (ref 8.4–10.2)
CHLORIDE SERPL-SCNC: 109 MMOL/L (ref 96–108)
CO2 SERPL-SCNC: 23 MMOL/L (ref 21–32)
CREAT SERPL-MCNC: 0.74 MG/DL (ref 0.6–1.3)
GFR SERPL CREATININE-BSD FRML MDRD: 92 ML/MIN/1.73SQ M
GLUCOSE SERPL-MCNC: 103 MG/DL (ref 65–140)
GLUCOSE SERPL-MCNC: 111 MG/DL (ref 65–140)
GLUCOSE SERPL-MCNC: 119 MG/DL (ref 65–140)
GLUCOSE SERPL-MCNC: 121 MG/DL (ref 65–140)
GLUCOSE SERPL-MCNC: 129 MG/DL (ref 65–140)
GLUCOSE SERPL-MCNC: 129 MG/DL (ref 65–140)
GLUCOSE SERPL-MCNC: 130 MG/DL (ref 65–140)
GLUCOSE SERPL-MCNC: 131 MG/DL (ref 65–140)
GLUCOSE SERPL-MCNC: 137 MG/DL (ref 65–140)
GLUCOSE SERPL-MCNC: 139 MG/DL (ref 65–140)
GLUCOSE SERPL-MCNC: 140 MG/DL (ref 65–140)
GLUCOSE SERPL-MCNC: 142 MG/DL (ref 65–140)
GLUCOSE SERPL-MCNC: 143 MG/DL (ref 65–140)
GLUCOSE SERPL-MCNC: 150 MG/DL (ref 65–140)
HCO3 BLDA-SCNC: 20.6 MMOL/L (ref 22–28)
HCO3 BLDA-SCNC: 21.4 MMOL/L (ref 22–28)
HCO3 BLDA-SCNC: 21.5 MMOL/L (ref 22–28)
HCO3 BLDA-SCNC: 22.9 MMOL/L (ref 24–30)
HCO3 BLDA-SCNC: 23.3 MMOL/L (ref 22–28)
HCO3 BLDA-SCNC: 23.9 MMOL/L (ref 22–28)
HCT VFR BLD AUTO: 41.2 % (ref 36.5–49.3)
HCT VFR BLD AUTO: 43.4 % (ref 36.5–49.3)
HCT VFR BLD CALC: 27 % (ref 36.5–49.3)
HCT VFR BLD CALC: 33 % (ref 36.5–49.3)
HCT VFR BLD CALC: 36 % (ref 36.5–49.3)
HCT VFR BLD CALC: 38 % (ref 36.5–49.3)
HCT VFR BLD CALC: 39 % (ref 36.5–49.3)
HCT VFR BLD CALC: 41 % (ref 36.5–49.3)
HGB BLD-MCNC: 15.2 G/DL (ref 12–17)
HGB BLD-MCNC: 15.4 G/DL (ref 12–17)
HGB BLDA-MCNC: 11.2 G/DL (ref 12–17)
HGB BLDA-MCNC: 12.2 G/DL (ref 12–17)
HGB BLDA-MCNC: 12.9 G/DL (ref 12–17)
HGB BLDA-MCNC: 13.3 G/DL (ref 12–17)
HGB BLDA-MCNC: 13.9 G/DL (ref 12–17)
HGB BLDA-MCNC: 9.2 G/DL (ref 12–17)
KCT BLD-ACNC: 120 SEC (ref 89–137)
KCT BLD-ACNC: 138 SEC (ref 89–137)
KCT BLD-ACNC: 462 SEC (ref 89–137)
KCT BLD-ACNC: 510 SEC (ref 89–137)
KCT BLD-ACNC: 686 SEC (ref 89–137)
P AXIS: 64 DEGREES
P AXIS: 66 DEGREES
PCO2 BLD: 22 MMOL/L (ref 21–32)
PCO2 BLD: 23 MMOL/L (ref 21–32)
PCO2 BLD: 23 MMOL/L (ref 21–32)
PCO2 BLD: 24 MMOL/L (ref 21–32)
PCO2 BLD: 25 MMOL/L (ref 21–32)
PCO2 BLD: 25 MMOL/L (ref 21–32)
PCO2 BLD: 38.5 MM HG (ref 36–44)
PCO2 BLD: 38.9 MM HG (ref 36–44)
PCO2 BLD: 42.9 MM HG (ref 36–44)
PCO2 BLD: 43.5 MM HG (ref 36–44)
PCO2 BLD: 44.1 MM HG (ref 42–50)
PCO2 BLD: 44.5 MM HG (ref 36–44)
PH BLD: 7.29 [PH] (ref 7.35–7.45)
PH BLD: 7.32 [PH] (ref 7.3–7.4)
PH BLD: 7.34 [PH] (ref 7.35–7.45)
PH BLD: 7.34 [PH] (ref 7.35–7.45)
PH BLD: 7.35 [PH] (ref 7.35–7.45)
PH BLD: 7.35 [PH] (ref 7.35–7.45)
PLATELET # BLD AUTO: 122 THOUSANDS/UL (ref 149–390)
PLATELET # BLD AUTO: 128 THOUSANDS/UL (ref 149–390)
PMV BLD AUTO: 9.5 FL (ref 8.9–12.7)
PMV BLD AUTO: 9.7 FL (ref 8.9–12.7)
PO2 BLD: 127 MM HG (ref 75–129)
PO2 BLD: 179 MM HG (ref 75–129)
PO2 BLD: 194 MM HG (ref 75–129)
PO2 BLD: 340 MM HG (ref 75–129)
PO2 BLD: 47 MM HG (ref 35–45)
PO2 BLD: 96 MM HG (ref 75–129)
POTASSIUM BLD-SCNC: 4.3 MMOL/L (ref 3.5–5.3)
POTASSIUM BLD-SCNC: 4.5 MMOL/L (ref 3.5–5.3)
POTASSIUM BLD-SCNC: 4.8 MMOL/L (ref 3.5–5.3)
POTASSIUM BLD-SCNC: 5 MMOL/L (ref 3.5–5.3)
POTASSIUM BLD-SCNC: 6 MMOL/L (ref 3.5–5.3)
POTASSIUM BLD-SCNC: 6.5 MMOL/L (ref 3.5–5.3)
POTASSIUM SERPL-SCNC: 4.3 MMOL/L (ref 3.5–5.3)
POTASSIUM SERPL-SCNC: 4.5 MMOL/L (ref 3.5–5.3)
POTASSIUM SERPL-SCNC: 4.6 MMOL/L (ref 3.5–5.3)
PR INTERVAL: 202 MS
PR INTERVAL: 230 MS
QRS AXIS: -18 DEGREES
QRS AXIS: -42 DEGREES
QRSD INTERVAL: 68 MS
QRSD INTERVAL: 68 MS
QT INTERVAL: 322 MS
QT INTERVAL: 384 MS
QTC INTERVAL: 415 MS
QTC INTERVAL: 426 MS
RH BLD: NEGATIVE
SAO2 % BLD FROM PO2: 100 % (ref 60–85)
SAO2 % BLD FROM PO2: 100 % (ref 60–85)
SAO2 % BLD FROM PO2: 79 % (ref 60–85)
SAO2 % BLD FROM PO2: 97 % (ref 60–85)
SAO2 % BLD FROM PO2: 99 % (ref 60–85)
SAO2 % BLD FROM PO2: 99 % (ref 60–85)
SODIUM BLD-SCNC: 129 MMOL/L (ref 136–145)
SODIUM BLD-SCNC: 133 MMOL/L (ref 136–145)
SODIUM BLD-SCNC: 137 MMOL/L (ref 136–145)
SODIUM BLD-SCNC: 137 MMOL/L (ref 136–145)
SODIUM BLD-SCNC: 138 MMOL/L (ref 136–145)
SODIUM BLD-SCNC: 138 MMOL/L (ref 136–145)
SODIUM SERPL-SCNC: 137 MMOL/L (ref 135–147)
SPECIMEN SOURCE: ABNORMAL
SPECIMEN SOURCE: NORMAL
T WAVE AXIS: 58 DEGREES
T WAVE AXIS: 70 DEGREES
VENTRICULAR RATE: 100 BPM
VENTRICULAR RATE: 74 BPM

## 2023-09-20 PROCEDURE — 71045 X-RAY EXAM CHEST 1 VIEW: CPT

## 2023-09-20 PROCEDURE — 86920 COMPATIBILITY TEST SPIN: CPT

## 2023-09-20 PROCEDURE — 85014 HEMATOCRIT: CPT | Performed by: PHYSICIAN ASSISTANT

## 2023-09-20 PROCEDURE — 82948 REAGENT STRIP/BLOOD GLUCOSE: CPT

## 2023-09-20 PROCEDURE — 33517 CABG ARTERY-VEIN SINGLE: CPT | Performed by: PHYSICIAN ASSISTANT

## 2023-09-20 PROCEDURE — 5A1221Z PERFORMANCE OF CARDIAC OUTPUT, CONTINUOUS: ICD-10-PCS | Performed by: THORACIC SURGERY (CARDIOTHORACIC VASCULAR SURGERY)

## 2023-09-20 PROCEDURE — 30233N0 TRANSFUSION OF AUTOLOGOUS RED BLOOD CELLS INTO PERIPHERAL VEIN, PERCUTANEOUS APPROACH: ICD-10-PCS | Performed by: THORACIC SURGERY (CARDIOTHORACIC VASCULAR SURGERY)

## 2023-09-20 PROCEDURE — 80048 BASIC METABOLIC PNL TOTAL CA: CPT | Performed by: PHYSICIAN ASSISTANT

## 2023-09-20 PROCEDURE — 021009W BYPASS CORONARY ARTERY, ONE ARTERY FROM AORTA WITH AUTOLOGOUS VENOUS TISSUE, OPEN APPROACH: ICD-10-PCS | Performed by: THORACIC SURGERY (CARDIOTHORACIC VASCULAR SURGERY)

## 2023-09-20 PROCEDURE — 82947 ASSAY GLUCOSE BLOOD QUANT: CPT

## 2023-09-20 PROCEDURE — 33508 ENDOSCOPIC VEIN HARVEST: CPT | Performed by: PHYSICIAN ASSISTANT

## 2023-09-20 PROCEDURE — 33533 CABG ARTERIAL SINGLE: CPT | Performed by: THORACIC SURGERY (CARDIOTHORACIC VASCULAR SURGERY)

## 2023-09-20 PROCEDURE — 02HV33Z INSERTION OF INFUSION DEVICE INTO SUPERIOR VENA CAVA, PERCUTANEOUS APPROACH: ICD-10-PCS | Performed by: ANESTHESIOLOGY

## 2023-09-20 PROCEDURE — 33508 ENDOSCOPIC VEIN HARVEST: CPT | Performed by: THORACIC SURGERY (CARDIOTHORACIC VASCULAR SURGERY)

## 2023-09-20 PROCEDURE — 93010 ELECTROCARDIOGRAM REPORT: CPT | Performed by: INTERNAL MEDICINE

## 2023-09-20 PROCEDURE — A7041 WATER SEAL DRAIN CONTAINER: HCPCS | Performed by: THORACIC SURGERY (CARDIOTHORACIC VASCULAR SURGERY)

## 2023-09-20 PROCEDURE — 85018 HEMOGLOBIN: CPT | Performed by: PHYSICIAN ASSISTANT

## 2023-09-20 PROCEDURE — 99223 1ST HOSP IP/OBS HIGH 75: CPT | Performed by: INTERNAL MEDICINE

## 2023-09-20 PROCEDURE — 94150 VITAL CAPACITY TEST: CPT

## 2023-09-20 PROCEDURE — 84295 ASSAY OF SERUM SODIUM: CPT

## 2023-09-20 PROCEDURE — 84132 ASSAY OF SERUM POTASSIUM: CPT | Performed by: PHYSICIAN ASSISTANT

## 2023-09-20 PROCEDURE — 82330 ASSAY OF CALCIUM: CPT

## 2023-09-20 PROCEDURE — 84132 ASSAY OF SERUM POTASSIUM: CPT

## 2023-09-20 PROCEDURE — 33533 CABG ARTERIAL SINGLE: CPT | Performed by: PHYSICIAN ASSISTANT

## 2023-09-20 PROCEDURE — 06BQ4ZZ EXCISION OF LEFT SAPHENOUS VEIN, PERCUTANEOUS ENDOSCOPIC APPROACH: ICD-10-PCS | Performed by: THORACIC SURGERY (CARDIOTHORACIC VASCULAR SURGERY)

## 2023-09-20 PROCEDURE — 93355 ECHO TRANSESOPHAGEAL (TEE): CPT

## 2023-09-20 PROCEDURE — 02100Z9 BYPASS CORONARY ARTERY, ONE ARTERY FROM LEFT INTERNAL MAMMARY, OPEN APPROACH: ICD-10-PCS | Performed by: THORACIC SURGERY (CARDIOTHORACIC VASCULAR SURGERY)

## 2023-09-20 PROCEDURE — 93005 ELECTROCARDIOGRAM TRACING: CPT

## 2023-09-20 PROCEDURE — 94002 VENT MGMT INPAT INIT DAY: CPT

## 2023-09-20 PROCEDURE — 82803 BLOOD GASES ANY COMBINATION: CPT

## 2023-09-20 PROCEDURE — 85014 HEMATOCRIT: CPT

## 2023-09-20 PROCEDURE — 33517 CABG ARTERY-VEIN SINGLE: CPT | Performed by: THORACIC SURGERY (CARDIOTHORACIC VASCULAR SURGERY)

## 2023-09-20 PROCEDURE — 5A1223Z PERFORMANCE OF CARDIAC PACING, CONTINUOUS: ICD-10-PCS | Performed by: THORACIC SURGERY (CARDIOTHORACIC VASCULAR SURGERY)

## 2023-09-20 PROCEDURE — 85049 AUTOMATED PLATELET COUNT: CPT | Performed by: THORACIC SURGERY (CARDIOTHORACIC VASCULAR SURGERY)

## 2023-09-20 PROCEDURE — 85347 COAGULATION TIME ACTIVATED: CPT

## 2023-09-20 PROCEDURE — 85049 AUTOMATED PLATELET COUNT: CPT | Performed by: PHYSICIAN ASSISTANT

## 2023-09-20 PROCEDURE — 94760 N-INVAS EAR/PLS OXIMETRY 1: CPT

## 2023-09-20 DEVICE — MARKER CORONARY BYPASS VOSS GRAFT: Type: IMPLANTABLE DEVICE | Site: HEART | Status: FUNCTIONAL

## 2023-09-20 RX ORDER — CHLORHEXIDINE GLUCONATE ORAL RINSE 1.2 MG/ML
15 SOLUTION DENTAL 2 TIMES DAILY
Status: DISCONTINUED | OUTPATIENT
Start: 2023-09-20 | End: 2023-09-21

## 2023-09-20 RX ORDER — HEPARIN SODIUM 1000 [USP'U]/ML
400 INJECTION, SOLUTION INTRAVENOUS; SUBCUTANEOUS ONCE
Status: DISCONTINUED | OUTPATIENT
Start: 2023-09-20 | End: 2023-09-20 | Stop reason: HOSPADM

## 2023-09-20 RX ORDER — HYDROMORPHONE HCL/PF 1 MG/ML
0.5 SYRINGE (ML) INJECTION EVERY 2 HOUR PRN
Status: DISCONTINUED | OUTPATIENT
Start: 2023-09-20 | End: 2023-09-21

## 2023-09-20 RX ORDER — CEFAZOLIN SODIUM 1 G/3ML
INJECTION, POWDER, FOR SOLUTION INTRAMUSCULAR; INTRAVENOUS AS NEEDED
Status: DISCONTINUED | OUTPATIENT
Start: 2023-09-20 | End: 2023-09-20

## 2023-09-20 RX ORDER — PROTAMINE SULFATE 10 MG/ML
INJECTION, SOLUTION INTRAVENOUS AS NEEDED
Status: DISCONTINUED | OUTPATIENT
Start: 2023-09-20 | End: 2023-09-20

## 2023-09-20 RX ORDER — HEPARIN SODIUM 1000 [USP'U]/ML
10000 INJECTION, SOLUTION INTRAVENOUS; SUBCUTANEOUS ONCE
Status: DISCONTINUED | OUTPATIENT
Start: 2023-09-20 | End: 2023-09-20 | Stop reason: HOSPADM

## 2023-09-20 RX ORDER — ACETAMINOPHEN 650 MG/1
650 SUPPOSITORY RECTAL EVERY 4 HOURS PRN
Status: DISCONTINUED | OUTPATIENT
Start: 2023-09-20 | End: 2023-09-20

## 2023-09-20 RX ORDER — ROCURONIUM BROMIDE 10 MG/ML
INJECTION, SOLUTION INTRAVENOUS AS NEEDED
Status: DISCONTINUED | OUTPATIENT
Start: 2023-09-20 | End: 2023-09-20

## 2023-09-20 RX ORDER — FENTANYL CITRATE 50 UG/ML
INJECTION, SOLUTION INTRAMUSCULAR; INTRAVENOUS AS NEEDED
Status: DISCONTINUED | OUTPATIENT
Start: 2023-09-20 | End: 2023-09-20

## 2023-09-20 RX ORDER — LIDOCAINE HCL/PF 100 MG/5ML
SYRINGE (ML) INJECTION AS NEEDED
Status: DISCONTINUED | OUTPATIENT
Start: 2023-09-20 | End: 2023-09-20

## 2023-09-20 RX ORDER — AMIODARONE HYDROCHLORIDE 200 MG/1
200 TABLET ORAL EVERY 8 HOURS SCHEDULED
Status: DISCONTINUED | OUTPATIENT
Start: 2023-09-20 | End: 2023-09-24 | Stop reason: HOSPADM

## 2023-09-20 RX ORDER — FONDAPARINUX SODIUM 2.5 MG/.5ML
2.5 INJECTION SUBCUTANEOUS DAILY
Status: DISCONTINUED | OUTPATIENT
Start: 2023-09-21 | End: 2023-09-21

## 2023-09-20 RX ORDER — MAGNESIUM SULFATE HEPTAHYDRATE 40 MG/ML
2 INJECTION, SOLUTION INTRAVENOUS ONCE
Status: COMPLETED | OUTPATIENT
Start: 2023-09-20 | End: 2023-09-20

## 2023-09-20 RX ORDER — CALCIUM CHLORIDE 100 MG/ML
INJECTION INTRAVENOUS; INTRAVENTRICULAR AS NEEDED
Status: DISCONTINUED | OUTPATIENT
Start: 2023-09-20 | End: 2023-09-20

## 2023-09-20 RX ORDER — OXYCODONE HYDROCHLORIDE 5 MG/1
5 TABLET ORAL EVERY 4 HOURS PRN
Status: DISCONTINUED | OUTPATIENT
Start: 2023-09-20 | End: 2023-09-21

## 2023-09-20 RX ORDER — POTASSIUM CHLORIDE 14.9 MG/ML
20 INJECTION INTRAVENOUS
Status: DISCONTINUED | OUTPATIENT
Start: 2023-09-20 | End: 2023-09-21

## 2023-09-20 RX ORDER — LIDOCAINE HYDROCHLORIDE 10 MG/ML
INJECTION, SOLUTION EPIDURAL; INFILTRATION; INTRACAUDAL; PERINEURAL
Status: COMPLETED | OUTPATIENT
Start: 2023-09-20 | End: 2023-09-20

## 2023-09-20 RX ORDER — SODIUM CHLORIDE, SODIUM LACTATE, POTASSIUM CHLORIDE, CALCIUM CHLORIDE 600; 310; 30; 20 MG/100ML; MG/100ML; MG/100ML; MG/100ML
INJECTION, SOLUTION INTRAVENOUS CONTINUOUS PRN
Status: DISCONTINUED | OUTPATIENT
Start: 2023-09-20 | End: 2023-09-20

## 2023-09-20 RX ORDER — MAGNESIUM HYDROXIDE 1200 MG/15ML
LIQUID ORAL AS NEEDED
Status: DISCONTINUED | OUTPATIENT
Start: 2023-09-20 | End: 2023-09-20 | Stop reason: HOSPADM

## 2023-09-20 RX ORDER — PANTOPRAZOLE SODIUM 40 MG/1
40 TABLET, DELAYED RELEASE ORAL DAILY
Status: DISCONTINUED | OUTPATIENT
Start: 2023-09-20 | End: 2023-09-21

## 2023-09-20 RX ORDER — BISACODYL 10 MG
10 SUPPOSITORY, RECTAL RECTAL DAILY PRN
Status: DISCONTINUED | OUTPATIENT
Start: 2023-09-20 | End: 2023-09-21

## 2023-09-20 RX ORDER — ONDANSETRON 2 MG/ML
INJECTION INTRAMUSCULAR; INTRAVENOUS AS NEEDED
Status: DISCONTINUED | OUTPATIENT
Start: 2023-09-20 | End: 2023-09-20

## 2023-09-20 RX ORDER — ATORVASTATIN CALCIUM 80 MG/1
80 TABLET, FILM COATED ORAL
Status: DISCONTINUED | OUTPATIENT
Start: 2023-09-20 | End: 2023-09-24 | Stop reason: HOSPADM

## 2023-09-20 RX ORDER — SODIUM CHLORIDE, SODIUM GLUCONATE, SODIUM ACETATE, POTASSIUM CHLORIDE, MAGNESIUM CHLORIDE, SODIUM PHOSPHATE, DIBASIC, AND POTASSIUM PHOSPHATE .53; .5; .37; .037; .03; .012; .00082 G/100ML; G/100ML; G/100ML; G/100ML; G/100ML; G/100ML; G/100ML
INJECTION, SOLUTION INTRAVENOUS AS NEEDED
Status: DISCONTINUED | OUTPATIENT
Start: 2023-09-20 | End: 2023-09-20

## 2023-09-20 RX ORDER — FUROSEMIDE 10 MG/ML
40 INJECTION INTRAMUSCULAR; INTRAVENOUS EVERY 6 HOURS PRN
Status: DISCONTINUED | OUTPATIENT
Start: 2023-09-20 | End: 2023-09-20

## 2023-09-20 RX ORDER — SODIUM CHLORIDE 450 MG/100ML
20 INJECTION, SOLUTION INTRAVENOUS CONTINUOUS
Status: DISCONTINUED | OUTPATIENT
Start: 2023-09-20 | End: 2023-09-21

## 2023-09-20 RX ORDER — SODIUM CHLORIDE 9 MG/ML
INJECTION, SOLUTION INTRAVENOUS CONTINUOUS PRN
Status: DISCONTINUED | OUTPATIENT
Start: 2023-09-20 | End: 2023-09-20

## 2023-09-20 RX ORDER — MIDAZOLAM HYDROCHLORIDE 2 MG/2ML
INJECTION, SOLUTION INTRAMUSCULAR; INTRAVENOUS AS NEEDED
Status: DISCONTINUED | OUTPATIENT
Start: 2023-09-20 | End: 2023-09-20

## 2023-09-20 RX ORDER — ACETAMINOPHEN 325 MG/1
650 TABLET ORAL
Status: DISCONTINUED | OUTPATIENT
Start: 2023-09-20 | End: 2023-09-21

## 2023-09-20 RX ORDER — NEOSTIGMINE METHYLSULFATE 1 MG/ML
INJECTION INTRAVENOUS AS NEEDED
Status: DISCONTINUED | OUTPATIENT
Start: 2023-09-20 | End: 2023-09-20

## 2023-09-20 RX ORDER — MAGNESIUM SULFATE 500 MG/ML
VIAL (ML) INJECTION AS NEEDED
Status: DISCONTINUED | OUTPATIENT
Start: 2023-09-20 | End: 2023-09-20

## 2023-09-20 RX ORDER — PROPOFOL 10 MG/ML
INJECTION, EMULSION INTRAVENOUS AS NEEDED
Status: DISCONTINUED | OUTPATIENT
Start: 2023-09-20 | End: 2023-09-20

## 2023-09-20 RX ORDER — AMIODARONE HYDROCHLORIDE 50 MG/ML
INJECTION, SOLUTION INTRAVENOUS AS NEEDED
Status: DISCONTINUED | OUTPATIENT
Start: 2023-09-20 | End: 2023-09-20

## 2023-09-20 RX ORDER — POLYETHYLENE GLYCOL 3350 17 G/17G
17 POWDER, FOR SOLUTION ORAL DAILY
Status: DISCONTINUED | OUTPATIENT
Start: 2023-09-20 | End: 2023-09-21

## 2023-09-20 RX ORDER — CALCIUM CHLORIDE 100 MG/ML
1 INJECTION INTRAVENOUS; INTRAVENTRICULAR ONCE
Status: DISCONTINUED | OUTPATIENT
Start: 2023-09-20 | End: 2023-09-20

## 2023-09-20 RX ORDER — CEFAZOLIN SODIUM 2 G/50ML
2000 SOLUTION INTRAVENOUS EVERY 8 HOURS
Status: COMPLETED | OUTPATIENT
Start: 2023-09-20 | End: 2023-09-21

## 2023-09-20 RX ORDER — ALBUMIN, HUMAN INJ 5% 5 %
12.5 SOLUTION INTRAVENOUS ONCE
Status: COMPLETED | OUTPATIENT
Start: 2023-09-20 | End: 2023-09-20

## 2023-09-20 RX ORDER — MANNITOL 250 MG/ML
INJECTION, SOLUTION INTRAVENOUS AS NEEDED
Status: DISCONTINUED | OUTPATIENT
Start: 2023-09-20 | End: 2023-09-20

## 2023-09-20 RX ORDER — ALBUMIN, HUMAN INJ 5% 5 %
12.5 SOLUTION INTRAVENOUS ONCE
Status: COMPLETED | OUTPATIENT
Start: 2023-09-21 | End: 2023-09-21

## 2023-09-20 RX ORDER — LIDOCAINE HCL/PF 100 MG/5ML
100 SYRINGE (ML) INJECTION
Status: DISCONTINUED | OUTPATIENT
Start: 2023-09-20 | End: 2023-09-21

## 2023-09-20 RX ORDER — CEFAZOLIN SODIUM 2 G/50ML
2000 SOLUTION INTRAVENOUS ONCE
Status: DISCONTINUED | OUTPATIENT
Start: 2023-09-20 | End: 2023-09-20 | Stop reason: HOSPADM

## 2023-09-20 RX ORDER — FENTANYL CITRATE 50 UG/ML
50 INJECTION, SOLUTION INTRAMUSCULAR; INTRAVENOUS
Status: DISCONTINUED | OUTPATIENT
Start: 2023-09-20 | End: 2023-09-20

## 2023-09-20 RX ORDER — FENTANYL CITRATE 50 UG/ML
50 INJECTION, SOLUTION INTRAMUSCULAR; INTRAVENOUS ONCE
Status: DISCONTINUED | OUTPATIENT
Start: 2023-09-20 | End: 2023-09-21

## 2023-09-20 RX ORDER — ASPIRIN 325 MG
325 TABLET ORAL DAILY
Status: DISCONTINUED | OUTPATIENT
Start: 2023-09-20 | End: 2023-09-23

## 2023-09-20 RX ORDER — GLYCOPYRROLATE 0.2 MG/ML
INJECTION INTRAMUSCULAR; INTRAVENOUS AS NEEDED
Status: DISCONTINUED | OUTPATIENT
Start: 2023-09-20 | End: 2023-09-20

## 2023-09-20 RX ORDER — EPHEDRINE SULFATE 50 MG/ML
INJECTION INTRAVENOUS AS NEEDED
Status: DISCONTINUED | OUTPATIENT
Start: 2023-09-20 | End: 2023-09-20

## 2023-09-20 RX ORDER — VANCOMYCIN HYDROCHLORIDE 1 G/20ML
INJECTION, POWDER, LYOPHILIZED, FOR SOLUTION INTRAVENOUS AS NEEDED
Status: DISCONTINUED | OUTPATIENT
Start: 2023-09-20 | End: 2023-09-20 | Stop reason: HOSPADM

## 2023-09-20 RX ORDER — ONDANSETRON 2 MG/ML
4 INJECTION INTRAMUSCULAR; INTRAVENOUS EVERY 6 HOURS PRN
Status: DISCONTINUED | OUTPATIENT
Start: 2023-09-20 | End: 2023-09-21

## 2023-09-20 RX ORDER — CHLORHEXIDINE GLUCONATE ORAL RINSE 1.2 MG/ML
15 SOLUTION DENTAL ONCE
Status: COMPLETED | OUTPATIENT
Start: 2023-09-20 | End: 2023-09-20

## 2023-09-20 RX ORDER — ALBUMIN, HUMAN INJ 5% 5 %
SOLUTION INTRAVENOUS
Status: COMPLETED
Start: 2023-09-20 | End: 2023-09-20

## 2023-09-20 RX ORDER — HEPARIN SODIUM 1000 [USP'U]/ML
INJECTION, SOLUTION INTRAVENOUS; SUBCUTANEOUS AS NEEDED
Status: DISCONTINUED | OUTPATIENT
Start: 2023-09-20 | End: 2023-09-20

## 2023-09-20 RX ORDER — POTASSIUM CHLORIDE 14.9 MG/ML
20 INJECTION INTRAVENOUS ONCE AS NEEDED
Status: DISCONTINUED | OUTPATIENT
Start: 2023-09-20 | End: 2023-09-21

## 2023-09-20 RX ORDER — LANOLIN ALCOHOL/MO/W.PET/CERES
6 CREAM (GRAM) TOPICAL
Status: DISCONTINUED | OUTPATIENT
Start: 2023-09-20 | End: 2023-09-21

## 2023-09-20 RX ORDER — AMINOCAPROIC ACID 250 MG/ML
INJECTION, SOLUTION INTRAVENOUS AS NEEDED
Status: DISCONTINUED | OUTPATIENT
Start: 2023-09-20 | End: 2023-09-20

## 2023-09-20 RX ADMIN — MANNITOL 25 G: 12.5 INJECTION, SOLUTION INTRAVENOUS at 10:27

## 2023-09-20 RX ADMIN — PROPOFOL 50 MG: 10 INJECTION, EMULSION INTRAVENOUS at 10:17

## 2023-09-20 RX ADMIN — NICARDIPINE HYDROCHLORIDE 2.5 MG/HR: 2.5 INJECTION, SOLUTION INTRAVENOUS at 12:46

## 2023-09-20 RX ADMIN — ALBUMIN (HUMAN) 12.5 G: 12.5 INJECTION, SOLUTION INTRAVENOUS at 14:25

## 2023-09-20 RX ADMIN — MUPIROCIN 1 APPLICATION: 20 OINTMENT TOPICAL at 21:05

## 2023-09-20 RX ADMIN — SODIUM CHLORIDE, SODIUM GLUCONATE, SODIUM ACETATE, POTASSIUM CHLORIDE, MAGNESIUM CHLORIDE, SODIUM PHOSPHATE, DIBASIC, AND POTASSIUM PHOSPHATE 500 ML: .53; .5; .37; .037; .03; .012; .00082 INJECTION, SOLUTION INTRAVENOUS at 10:27

## 2023-09-20 RX ADMIN — CALCIUM CHLORIDE 1 G: 100 INJECTION INTRAVENOUS; INTRAVENTRICULAR at 11:12

## 2023-09-20 RX ADMIN — AMIODARONE HYDROCHLORIDE 200 MG: 200 TABLET ORAL at 21:05

## 2023-09-20 RX ADMIN — CHLORHEXIDINE GLUCONATE 15 ML: 1.2 SOLUTION ORAL at 21:05

## 2023-09-20 RX ADMIN — PROTAMINE SULFATE 300 MG: 10 INJECTION, SOLUTION INTRAVENOUS at 11:16

## 2023-09-20 RX ADMIN — PHENYLEPHRINE HYDROCHLORIDE 500 MCG: 10 INJECTION INTRAVENOUS at 10:34

## 2023-09-20 RX ADMIN — ROCURONIUM BROMIDE 50 MG: 10 INJECTION, SOLUTION INTRAVENOUS at 10:30

## 2023-09-20 RX ADMIN — SODIUM CHLORIDE, SODIUM LACTATE, POTASSIUM CHLORIDE, AND CALCIUM CHLORIDE 500 ML: .6; .31; .03; .02 INJECTION, SOLUTION INTRAVENOUS at 12:35

## 2023-09-20 RX ADMIN — HEPARIN SODIUM 5000 UNITS: 1000 INJECTION INTRAVENOUS; SUBCUTANEOUS at 10:27

## 2023-09-20 RX ADMIN — PROPOFOL 30 MCG/KG/MIN: 10 INJECTION, EMULSION INTRAVENOUS at 11:08

## 2023-09-20 RX ADMIN — PHENYLEPHRINE HYDROCHLORIDE 500 MCG: 10 INJECTION INTRAVENOUS at 11:10

## 2023-09-20 RX ADMIN — LIDOCAINE HYDROCHLORIDE 100 MG: 20 INJECTION INTRAVENOUS at 11:09

## 2023-09-20 RX ADMIN — CEFAZOLIN SODIUM 2000 MG: 2 SOLUTION INTRAVENOUS at 20:24

## 2023-09-20 RX ADMIN — FENTANYL CITRATE 500 MCG: 0.05 INJECTION, SOLUTION INTRAMUSCULAR; INTRAVENOUS at 08:23

## 2023-09-20 RX ADMIN — Medication 12.5 MG: at 06:50

## 2023-09-20 RX ADMIN — GLYCOPYRROLATE 0.8 MCG: 0.2 INJECTION, SOLUTION INTRAMUSCULAR; INTRAVENOUS at 11:40

## 2023-09-20 RX ADMIN — MAGNESIUM SULFATE HEPTAHYDRATE 2 G: 40 INJECTION, SOLUTION INTRAVENOUS at 20:56

## 2023-09-20 RX ADMIN — FENTANYL CITRATE 50 MCG: 0.05 INJECTION, SOLUTION INTRAMUSCULAR; INTRAVENOUS at 11:40

## 2023-09-20 RX ADMIN — MIDAZOLAM 3 MG: 1 INJECTION INTRAMUSCULAR; INTRAVENOUS at 08:18

## 2023-09-20 RX ADMIN — SODIUM CHLORIDE, SODIUM LACTATE, POTASSIUM CHLORIDE, AND CALCIUM CHLORIDE 500 ML: .6; .31; .03; .02 INJECTION, SOLUTION INTRAVENOUS at 13:00

## 2023-09-20 RX ADMIN — NICARDIPINE HYDROCHLORIDE 2 MG/HR: 25 INJECTION, SOLUTION INTRAVENOUS at 11:23

## 2023-09-20 RX ADMIN — LIDOCAINE HYDROCHLORIDE 0.5 ML: 10 INJECTION, SOLUTION EPIDURAL; INFILTRATION; INTRACAUDAL; PERINEURAL at 08:20

## 2023-09-20 RX ADMIN — PROPOFOL 50 MG: 10 INJECTION, EMULSION INTRAVENOUS at 11:15

## 2023-09-20 RX ADMIN — AMIODARONE HYDROCHLORIDE 200 MG: 200 TABLET ORAL at 16:26

## 2023-09-20 RX ADMIN — SODIUM CHLORIDE, SODIUM LACTATE, POTASSIUM CHLORIDE, AND CALCIUM CHLORIDE: .6; .31; .03; .02 INJECTION, SOLUTION INTRAVENOUS at 09:15

## 2023-09-20 RX ADMIN — HYDROMORPHONE HYDROCHLORIDE 0.5 MG: 1 INJECTION, SOLUTION INTRAMUSCULAR; INTRAVENOUS; SUBCUTANEOUS at 15:18

## 2023-09-20 RX ADMIN — PHENYLEPHRINE HYDROCHLORIDE 500 MCG: 10 INJECTION INTRAVENOUS at 11:11

## 2023-09-20 RX ADMIN — FENTANYL CITRATE 100 MCG: 0.05 INJECTION, SOLUTION INTRAMUSCULAR; INTRAVENOUS at 12:02

## 2023-09-20 RX ADMIN — EPHEDRINE SULFATE 5 MG: 50 INJECTION INTRAVENOUS at 10:12

## 2023-09-20 RX ADMIN — Medication 750 ML: at 10:35

## 2023-09-20 RX ADMIN — EPHEDRINE SULFATE 5 MG: 50 INJECTION INTRAVENOUS at 10:30

## 2023-09-20 RX ADMIN — PHENYLEPHRINE HYDROCHLORIDE 10 MCG/MIN: 10 INJECTION INTRAVENOUS at 10:20

## 2023-09-20 RX ADMIN — CHLORHEXIDINE GLUCONATE 15 ML: 1.2 SOLUTION ORAL at 06:51

## 2023-09-20 RX ADMIN — OXYCODONE HYDROCHLORIDE 5 MG: 5 TABLET ORAL at 20:24

## 2023-09-20 RX ADMIN — ALBUMIN, HUMAN INJ 5% 12.5 G: 5 SOLUTION at 14:25

## 2023-09-20 RX ADMIN — SODIUM CHLORIDE 20 ML/HR: 0.45 INJECTION, SOLUTION INTRAVENOUS at 12:46

## 2023-09-20 RX ADMIN — AMINOCAPROIC ACID 5 G: 250 INJECTION, SOLUTION INTRAVENOUS at 08:50

## 2023-09-20 RX ADMIN — ACETAMINOPHEN 650 MG: 325 TABLET, FILM COATED ORAL at 20:24

## 2023-09-20 RX ADMIN — PHENYLEPHRINE HYDROCHLORIDE 500 MCG: 10 INJECTION INTRAVENOUS at 11:09

## 2023-09-20 RX ADMIN — ROCURONIUM BROMIDE 100 MG: 10 INJECTION, SOLUTION INTRAVENOUS at 08:23

## 2023-09-20 RX ADMIN — SODIUM CHLORIDE 1.5 UNITS/HR: 9 INJECTION, SOLUTION INTRAVENOUS at 12:45

## 2023-09-20 RX ADMIN — MUPIROCIN 1 APPLICATION: 20 OINTMENT TOPICAL at 06:52

## 2023-09-20 RX ADMIN — EPHEDRINE SULFATE 5 MG: 50 INJECTION INTRAVENOUS at 09:05

## 2023-09-20 RX ADMIN — FENTANYL CITRATE 100 MCG: 0.05 INJECTION, SOLUTION INTRAMUSCULAR; INTRAVENOUS at 11:15

## 2023-09-20 RX ADMIN — ATORVASTATIN CALCIUM 80 MG: 80 TABLET, FILM COATED ORAL at 16:26

## 2023-09-20 RX ADMIN — FENTANYL CITRATE 500 MCG: 0.05 INJECTION, SOLUTION INTRAMUSCULAR; INTRAVENOUS at 09:08

## 2023-09-20 RX ADMIN — OXYCODONE HYDROCHLORIDE 5 MG: 5 TABLET ORAL at 16:25

## 2023-09-20 RX ADMIN — EPHEDRINE SULFATE 5 MG: 50 INJECTION INTRAVENOUS at 08:23

## 2023-09-20 RX ADMIN — CEFAZOLIN 3000 MG: 1 INJECTION, POWDER, FOR SOLUTION INTRAMUSCULAR; INTRAVENOUS at 11:30

## 2023-09-20 RX ADMIN — NEOSTIGMINE METHYLSULFATE 5 MG: 1 INJECTION INTRAVENOUS at 11:40

## 2023-09-20 RX ADMIN — CALCIUM CHLORIDE 0.5 G: 100 INJECTION INTRAVENOUS; INTRAVENTRICULAR at 11:50

## 2023-09-20 RX ADMIN — HEPARIN SODIUM 5000 UNITS: 1000 INJECTION INTRAVENOUS; SUBCUTANEOUS at 10:50

## 2023-09-20 RX ADMIN — PHENYLEPHRINE HYDROCHLORIDE 30 MCG/MIN: 10 INJECTION INTRAVENOUS at 14:23

## 2023-09-20 RX ADMIN — EPHEDRINE SULFATE 5 MG: 50 INJECTION INTRAVENOUS at 10:20

## 2023-09-20 RX ADMIN — SODIUM CHLORIDE: 9 INJECTION, SOLUTION INTRAVENOUS at 08:55

## 2023-09-20 RX ADMIN — EPHEDRINE SULFATE 5 MG: 50 INJECTION INTRAVENOUS at 11:29

## 2023-09-20 RX ADMIN — PROPOFOL 50 MG: 10 INJECTION, EMULSION INTRAVENOUS at 08:23

## 2023-09-20 RX ADMIN — SODIUM BICARBONATE 25 MEQ: 84 INJECTION, SOLUTION INTRAVENOUS at 11:08

## 2023-09-20 RX ADMIN — MIDAZOLAM 7 MG: 1 INJECTION INTRAMUSCULAR; INTRAVENOUS at 08:23

## 2023-09-20 RX ADMIN — CEFAZOLIN 3000 MG: 1 INJECTION, POWDER, FOR SOLUTION INTRAMUSCULAR; INTRAVENOUS at 08:50

## 2023-09-20 RX ADMIN — AMIODARONE HYDROCHLORIDE 150 MG: 50 INJECTION, SOLUTION INTRAVENOUS at 11:08

## 2023-09-20 RX ADMIN — MELATONIN TAB 3 MG 6 MG: 3 TAB at 21:05

## 2023-09-20 RX ADMIN — SODIUM BICARBONATE 50 MEQ: 84 INJECTION, SOLUTION INTRAVENOUS at 10:27

## 2023-09-20 RX ADMIN — AMINOCAPROIC ACID 2 G/HR: 250 INJECTION, SOLUTION INTRAVENOUS at 09:00

## 2023-09-20 RX ADMIN — SODIUM CHLORIDE, SODIUM LACTATE, POTASSIUM CHLORIDE, AND CALCIUM CHLORIDE: .6; .31; .03; .02 INJECTION, SOLUTION INTRAVENOUS at 11:19

## 2023-09-20 RX ADMIN — HEPARIN SODIUM 35000 UNITS: 1000 INJECTION INTRAVENOUS; SUBCUTANEOUS at 10:00

## 2023-09-20 RX ADMIN — SODIUM CHLORIDE 1 EACH: 0.9 INJECTION, SOLUTION INTRAVENOUS at 10:26

## 2023-09-20 RX ADMIN — EPHEDRINE SULFATE 5 MG: 50 INJECTION INTRAVENOUS at 10:04

## 2023-09-20 RX ADMIN — SODIUM CHLORIDE: 0.9 INJECTION, SOLUTION INTRAVENOUS at 08:16

## 2023-09-20 RX ADMIN — SODIUM CHLORIDE, SODIUM LACTATE, POTASSIUM CHLORIDE, CALCIUM CHLORIDE: 600; 310; 30; 20 INJECTION, SOLUTION INTRAVENOUS at 08:55

## 2023-09-20 RX ADMIN — MAGNESIUM SULFATE HEPTAHYDRATE 2 G: 500 INJECTION, SOLUTION INTRAMUSCULAR; INTRAVENOUS at 11:08

## 2023-09-20 RX ADMIN — ONDANSETRON 4 MG: 2 INJECTION INTRAMUSCULAR; INTRAVENOUS at 11:16

## 2023-09-20 RX ADMIN — SODIUM CHLORIDE, SODIUM LACTATE, POTASSIUM CHLORIDE, AND CALCIUM CHLORIDE: .6; .31; .03; .02 INJECTION, SOLUTION INTRAVENOUS at 11:57

## 2023-09-20 RX ADMIN — MAGNESIUM SULFATE HEPTAHYDRATE 2 G: 40 INJECTION, SOLUTION INTRAVENOUS at 12:42

## 2023-09-20 NOTE — OP NOTE
OPERATIVE REPORT  PATIENT NAME: Esme Munoz    :  1950  MRN: [id number]  Pt Location: [facility] OR ROOM 12    SURGERY DATE: 2023    SURGEON: Kerry Leroy MD    ASSISTANT: Tameka Anderson MD    ADDITIONAL ASSISTANT: Yusef Wright PA-C    PREOPERATIVE DIAGNOSIS:  Multivessel coronary artery disease    POSTOPERATIVE DIAGNOSIS:  Multivessel coronary artery disease    NYHA Class: 3    CCS Class: 3    PROCEDURE: Coronary artery bypass grafting x 2 with left internal mammary artery to left anterior descending, saphenous vein graft to right posterior descending artery. ANESTHESIA: General endotracheal anesthesia with transesophageal echocardiogram guidance, Dr. Sorenson : 42 minutes. CROSSCLAMP TIME: 35 minutes. PACKS/TUBES/DRAINS: Chest tubes x 3. MATERIALS: Pacing wires: A x1. V x1. TRANSFUSION: None. SPECIMENS: None. ESTIMATED BLOOD LOSS: 200 mL    OPERATIVE TECHNIQUE:    The patient was taken to the operating room and placed supine on the operating table. Following the satisfactory induction of general anesthesia and placement of monitoring lines, the patient was prepped and draped in the usual sterile fashion. A time-out procedure was performed. The patient underwent median sternotomy, LIMA harvest, endoscopic left greater saphenous vein harvest, systemic heparinization and conduit preparation. The patient underwent pericardiotomy and epiaortic ultrasound was used to evaluate the ascending aorta, which was found to be free of significant atheromatous disease. The patient underwent aortic and right atrial cannulation and was initiated on bypass. The ascending aorta was crossclamped. Antegrade del Nido cardioplegia was delivered with an excellent arrest.    The saphenous vein was anastomosed to the right posterior descending artery in end-to-side fashion using running 7-0 Prolene suture.  The first diagonal was evaluated but was not a suitable target for grafting. The left internal mammary artery was anastomosed to the left anterior descending in end-to-side fashion using running 7-0 Prolene suture. A total of 1 proximal anastomosis was completed on the ascending aorta in end-to-side fashion using running 5-0 Prolene suture. The heart was de-aired and the crossclamp was removed. Atrial and ventricular pacing wires were placed. Following a period of reperfusion, the patient was weaned from cardiopulmonary bypass and decannulated. Protamine was administered with normalization of the ACT. Hemostasis was confirmed in all fields. Thoracostomy tubes were placed. The sternum was closed with stainless steel wires. The fascia, subdermis and skin were closed with multiple layers of running absorbable suture. As the attending surgeon, I was present and scrubbed for all critical portions of this procedure. Sponge, needle and instrument counts were reported as correct by the nursing staff. There is no cardiac residency program at this facility and for complex procedures such as this, it is vital to have a physician assistant experienced in cardiac surgery. The assistance of Leny Plasencia was necessary for endoscopic saphenous vein harvesting, retraction of the heart and coronary conduit with proper tissue handling techniques, and following of the suture with correct tension during construction of the proximal and distal coronary anastomoses. Final transesophageal echocardiogram demonstrated no change from the preoperative study.         Chinyere Ramey MD  DATE: September 20, 2023  TIME: 11:53 AM

## 2023-09-20 NOTE — ANESTHESIA POSTPROCEDURE EVALUATION
Post-Op Assessment Note    CV Status:  Stable      Airway: intubated      Post Op Vitals Reviewed: Yes      Staff: Anesthesiologist   Comments: see intra-op quick note for details of ICU hand off        No notable events documented.     BP     Temp      Pulse     Resp      SpO2

## 2023-09-20 NOTE — ANESTHESIA PROCEDURE NOTES
Central Line Insertion    Performed by: Porfirio Burns MD  Authorized by: Porfirio Burns MD    Date/Time: 9/20/2023 8:35 AM  Catheter Type:  triple lumen  Consent: Verbal consent obtained. Written consent obtained. Risks and benefits: risks, benefits and alternatives were discussed  Consent given by: patient  Patient understanding: patient states understanding of the procedure being performed  Patient consent: the patient's understanding of the procedure matches consent given  Procedure consent: procedure consent matches procedure scheduled  Relevant documents: relevant documents present and verified  Required items: required blood products, implants, devices, and special equipment available  Patient identity confirmed: arm band  Indications: vascular access and central pressure monitoring  Catheter size: 7 Fr  Patient position: Trendelenburg  Anesthesia method: ga.   Assessment: blood return through all ports and free fluid flow  Preparation: skin prepped with ChloraPrep  Skin prep agent dried: skin prep agent completely dried prior to procedure  Sterile barriers: all five maximum sterile barriers used - cap, mask, sterile gown, sterile gloves, and large sterile sheet  Hand hygiene: hand hygiene performed prior to central venous catheter insertion  sterile gel and probe cover used in ultrasound-guided central venous catheter insertionultrasound permanent image saved  Vessel of Catheter Tip End: central venous  Number of attempts: 1  Successful placement: yes  Post-procedure: chlorhexidine patch applied, dressing applied and line sutured  Patient tolerance: patient tolerated the procedure well with no immediate complications

## 2023-09-20 NOTE — PLAN OF CARE
Problem: Prexisting or High Potential for Compromised Skin Integrity  Goal: Skin integrity is maintained or improved  Description: INTERVENTIONS:  - Identify patients at risk for skin breakdown  - Assess and monitor skin integrity  - Assess and monitor nutrition and hydration status  - Monitor labs   - Assess for incontinence   - Turn and reposition patient  - Assist with mobility/ambulation  - Relieve pressure over bony prominences  - Avoid friction and shearing  - Provide appropriate hygiene as needed including keeping skin clean and dry  - Evaluate need for skin moisturizer/barrier cream  - Collaborate with interdisciplinary team   - Patient/family teaching  - Consider wound care consult   9/20/2023 1747 by Justo Alfred RN  Outcome: Progressing  9/20/2023 1743 by Justo Alfred RN  Outcome: Progressing  9/20/2023 1743 by Justo Alfred RN  Outcome: Progressing     Problem: Nutrition/Hydration-ADULT  Goal: Nutrient/Hydration intake appropriate for improving, restoring or maintaining nutritional needs  Description: Monitor and assess patient's nutrition/hydration status for malnutrition. Collaborate with interdisciplinary team and initiate plan and interventions as ordered. Monitor patient's weight and dietary intake as ordered or per policy. Utilize nutrition screening tool and intervene as necessary. Determine patient's food preferences and provide high-protein, high-caloric foods as appropriate.      INTERVENTIONS:  - Monitor oral intake, urinary output, labs, and treatment plans  - Assess nutrition and hydration status and recommend course of action  - Evaluate amount of meals eaten  - Assist patient with eating if necessary   - Allow adequate time for meals  - Recommend/ encourage appropriate diets, oral nutritional supplements, and vitamin/mineral supplements  - Order, calculate, and assess calorie counts as needed  - Recommend, monitor, and adjust tube feedings and TPN/PPN based on assessed needs  - Assess need for intravenous fluids  - Provide specific nutrition/hydration education as appropriate  - Include patient/family/caregiver in decisions related to nutrition  9/20/2023 1747 by Chaitanya Kennedy RN  Outcome: Progressing  9/20/2023 1743 by Chaitanya Kennedy RN  Outcome: Progressing  9/20/2023 1743 by Chaitanya Kennedy RN  Outcome: Progressing     Problem: PAIN - ADULT  Goal: Verbalizes/displays adequate comfort level or baseline comfort level  Description: Interventions:  - Encourage patient to monitor pain and request assistance  - Assess pain using appropriate pain scale  - Administer analgesics based on type and severity of pain and evaluate response  - Implement non-pharmacological measures as appropriate and evaluate response  - Consider cultural and social influences on pain and pain management  - Notify physician/advanced practitioner if interventions unsuccessful or patient reports new pain  9/20/2023 1747 by Chaitanya Kennedy RN  Outcome: Progressing  9/20/2023 1743 by Chaitanya Kennedy RN  Outcome: Progressing  9/20/2023 1743 by Chaitanya Kennedy RN  Outcome: Progressing     Problem: INFECTION - ADULT  Goal: Absence or prevention of progression during hospitalization  Description: INTERVENTIONS:  - Assess and monitor for signs and symptoms of infection  - Monitor lab/diagnostic results  - Monitor all insertion sites, i.e. indwelling lines, tubes, and drains  - [place] appropriate cooling/warming therapies per order  - Administer medications as ordered  - Instruct and encourage patient and family to use good hand hygiene technique  - Identify and instruct in appropriate isolation precautions for identified infection/condition  9/20/2023 1747 by Chaitanya Kennedy RN  Outcome: Progressing  9/20/2023 1743 by Chaitanya Kennedy RN  Outcome: Progressing  9/20/2023 1743 by Chaitanya Kennedy RN  Outcome: Progressing  Goal: Absence of fever/infection during neutropenic period  Description: INTERVENTIONS:  - Monitor WBC    9/20/2023 1747 by Eloy Quintanilla RN  Outcome: Progressing  9/20/2023 1743 by Eloy Quintanilla RN  Outcome: Progressing  9/20/2023 1743 by Eloy Quintanilla RN  Outcome: Progressing     Problem: SAFETY ADULT  Goal: Maintain or return to baseline ADL function  Description: INTERVENTIONS:  -  Assess patient's ability to carry out ADLs; assess patient's baseline for ADL function and identify physical deficits which impact ability to perform ADLs (bathing, care of mouth/teeth, toileting, grooming, dressing, etc.)  - Assess/evaluate cause of self-care deficits   - Assess range of motion  - Assess patient's mobility; develop plan if impaired  - Assess patient's need for assistive devices and provide as appropriate  - Encourage maximum independence but intervene and supervise when necessary  - Involve family in performance of ADLs  - Assess for home care needs following discharge   - Consider OT consult to assist with ADL evaluation and planning for discharge  - Provide patient education as appropriate  9/20/2023 1747 by Eloy Quintanilla RN  Outcome: Progressing  9/20/2023 1743 by Eloy Quintanilla RN  Outcome: Progressing  9/20/2023 1743 by Eloy Quintanilla RN  Outcome: Progressing  Goal: Maintains/Returns to pre admission functional level  Description: INTERVENTIONS:  - Perform BMAT or MOVE assessment daily.   - Set and communicate daily mobility goal to care team and patient/family/caregiver. - Collaborate with rehabilitation services on mobility goals if consulted  - Perform Range of Motion 3 times a day. - Reposition patient every 2 hours.   - Out of bed for toileting  - Record patient progress and toleration of activity level   9/20/2023 1747 by Eloy Quintanilla RN  Outcome: Progressing  9/20/2023 1743 by Eloy Quintanilla RN  Outcome: Progressing  9/20/2023 1743 by Eloy Quintanilla RN  Outcome: Progressing  Goal: Patient will remain free of falls  Description: INTERVENTIONS:  - Educate patient/family on patient safety including physical limitations  - Instruct patient to call for assistance with activity   - Consult OT/PT to assist with strengthening/mobility   - Keep Call bell within reach  - Keep bed low and locked with side rails adjusted as appropriate  - Keep care items and personal belongings within reach  - Initiate and maintain comfort rounds  - Make Fall Risk Sign visible to staff  - Offer Toileting every 2 Hours, in advance of need  - Initiate/Maintain bed alarm  - Apply yellow socks and bracelet for high fall risk patients  - Consider moving patient to room near nurses station  9/20/2023 1747 by Ally Barragan, RN  Outcome: Progressing  9/20/2023 1743 by Ally Barragan, RN  Outcome: Progressing  9/20/2023 1743 by Ally Barragan, RN  Outcome: Progressing
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
## 2023-09-20 NOTE — ANESTHESIA PREPROCEDURE EVALUATION
Procedure:  CORONARY ARTERY BYPASS GRAFT (CABG)  X 3-VESSELS EVH W/ CALOS (Chest)    Relevant Problems   CARDIO   (+) Combined hyperlipidemia   (+) Coronary artery disease involving native coronary artery of native heart without angina pectoris   (+) Hypertension, essential      PULMONARY   (+) ERIC (obstructive sleep apnea)      LHC: pLAD 55, mLAD 85, D1 70, D2 100, pRCA 75, dRCA 75, RPDA 80    EF 40 (mid antsept, mid infsept, apical septal, apex), DD1, normal RV systolic function, mild MR    B/L ICA < 50    Cr 0.84, Hgb 17.s, plt 173  Physical Exam    Airway    Mallampati score: II  TM Distance: >3 FB  Neck ROM: full     Dental       Cardiovascular      Pulmonary      Other Findings  None loose      Anesthesia Plan  ASA Score- 4     Anesthesia Type- general with ASA Monitors. Additional Monitors:   Airway Plan:     Comment:   General anesthesia, endotracheal Intubation. Standard ASA monitors. Large bore peripheral IV. Pre-induction arterial line. CVP (Triple Lumen) and Cordis with PAC. CALOS for perioperative monitoring and diagnosis. Post-op ICU/vent. Risks and benefits discussed with patient; patient consented and agrees to proceed. .       Plan Factors-    Chart reviewed. Existing labs reviewed. Induction- intravenous. Postoperative Plan-     Informed Consent- Anesthetic plan and risks discussed with patient.

## 2023-09-20 NOTE — ANESTHESIA PROCEDURE NOTES
Procedure Performed: CALOS Anesthesia  Start Time:  9/20/2023 8:50 AM        Preanesthesia Checklist    Patient identified, IV assessed, risks and benefits discussed, monitors and equipment assessed, procedure being performed at surgeon's request and anesthesia consent obtained. Procedure    Diagnostic Indications for CALOS:  hemodynamic monitoring. Type of CALOS: complete CALOS with interpretation. Images Saved: ultrasound permanent image saved. Physician Requesting Echo: Roc Cowan MD.  Location performed: OR. Intubated. Bite block not placed. Heart visualized. Insertion of CALOS Probe:  Atraumatic. Probe Type:  Epiaortic and multiplane. Modalities:  3D, color flow mapping, continuous wave Doppler and pulse wave Doppler. Echocardiographic and Doppler Measurements    PREPROCEDURE    LEFT VENTRICLE:  Systolic Function: mildly impaired. Ejection Fraction: 45%. Regional Wall Motion Abnormalities: mid ant-sept, sept, infsept severe HK (myocardial thinned, aneurysmal appearing). RIGHT VENTRICLE:  Systolic Function: normal.  Cavity size normal.              AORTIC VALVE:  Leaflets: normal and trileaflet. Leaflet motions normal and normal. Stenosis: none. Regurgitation: none. MITRAL VALVE:  Leaflets: normal. Leaflet Motions: prolapse. Regurgitation: mild. Stenosis: none. TRICUSPID VALVE:  Leaflets: normal.   Regurgitation: trace. ASCENDING AORTA:    Dissection not present. AORTIC ARCH:    dissection not present. Grade 2: severe intimal thickening without protruding atheroma. DESCENDING AORTA:    Dissection not present. Grade 2: severe intimal thickening without protruding atheroma. LEFT ATRIAL APPENDAGE:    Emptying Velocity: 4 cm/sec. OTHER ATRIAL FINDINGS:  No JOEL clot. ATRIAL SEPTUM:  Intra-atrial septal morphology: no PFO by CFD. EPIAORTIC:  Plaque Thickness: 0-5 mm. POSTPROCEDURE    LEFT VENTRICLE: Unchanged . RIGHT VENTRICLE: Unchanged . AORTIC VALVE: Unchanged . MITRAL VALVE: Unchanged . TRICUSPID VALVE: Unchanged . AORTA: Unchanged . Dissection: Dissection not present. REMOVAL:  Probe Removal: atraumatic.

## 2023-09-20 NOTE — ANESTHESIA PROCEDURE NOTES
Introducer/[place]    Performed by: Erika Bentley MD  Authorized by: Erika Bentley MD    Date/Time: 9/20/2023 8:34 AM  Consent: Verbal consent obtained. Written consent obtained. Risks and benefits: risks, benefits and alternatives were discussed  Consent given by: patient  Patient understanding: patient states understanding of the procedure being performed  Patient consent: the patient's understanding of the procedure matches consent given  Procedure consent: procedure consent matches procedure scheduled  Relevant documents: relevant documents present and verified  Required items: required blood products, implants, devices, and special equipment available  Patient identity confirmed: arm band  Indications: vascular access and central pressure monitoring  Location details: right internal jugular  Catheter size: 9 Fr  Patient position: Trendelenburg  Anesthesia method: ga.   Assessment: blood return through all ports and free fluid flow  Preparation: skin prepped with ChloraPrep  Skin prep agent dried: skin prep agent completely dried prior to procedure  Sterile barriers: all five maximum sterile barriers used - cap, mask, sterile gown, sterile gloves, and large sterile sheet  Hand hygiene: hand hygiene performed prior to central venous catheter insertion  Ultrasound guidance: yes  ultrasound permanent image saved  Site selection rationale: CABG  Number of attempts: 1  Successful placement: yes  Post-procedure: line sutured and dressing applied  Patient tolerance: patient tolerated the procedure well with no immediate complications

## 2023-09-20 NOTE — ANESTHESIA PROCEDURE NOTES
Arterial Line Insertion    Performed by: Tracie Cornell MD  Authorized by: Tracie Cornell MD  Consent: Verbal consent obtained. Written consent obtained. Risks and benefits: risks, benefits and alternatives were discussed  Consent given by: patient  Patient understanding: patient states understanding of the procedure being performed  Patient consent: the patient's understanding of the procedure matches consent given  Procedure consent: procedure consent matches procedure scheduled  Relevant documents: relevant documents present and verified  Required items: required blood products, implants, devices, and special equipment available  Patient identity confirmed: arm band  Preparation: Patient was prepped and draped in the usual sterile fashion. Indications: multiple ABGs and hemodynamic monitoring  Orientation:  Right  Location: radial arterylidocaine (PF) (XYLOCAINE-MPF) 1 % - Infiltration   0.5 mL - 9/20/2023 8:20:00 AM  Sedation:  Patient sedated: yes  Sedatives: see MAR for details  Analgesia: see MAR for details  Vitals: Vital signs were monitored during sedation.     Procedure Details:  Needle gauge: 20  Seldinger technique: Seldinger technique used  Number of attempts: 1    Post-procedure:  Post-procedure: dressing applied  Waveform: good waveform and waveform confirmed  Post-procedure CNS: normal and unchanged  Patient tolerance: patient tolerated the procedure well with no immediate complications

## 2023-09-20 NOTE — INTERVAL H&P NOTE
Vitals:    09/20/23 0643   BP: (!) 157/102   Pulse: 64   Resp: 20   Temp: 98 °F (36.7 °C)   SpO2: 97%         H&P reviewed. After examining the patient I find no changes in the patients condition since the H&P was completed. Plan for CABG. Preoperative Beta Blocker: indicated. Anticipated Length of Stay: Patient will be admitted on an inpatient basis with an anticipated length of stay of grater than 2 midnights. Justification for Hospital StaY: Post surgical recovery following open heart surgery.       Arnette Dancer, MD  09/20/23  7:27 AM

## 2023-09-21 ENCOUNTER — APPOINTMENT (INPATIENT)
Dept: RADIOLOGY | Facility: HOSPITAL | Age: 73
DRG: 235 | End: 2023-09-21
Payer: MEDICARE

## 2023-09-21 ENCOUNTER — TELEPHONE (OUTPATIENT)
Dept: CARDIOLOGY CLINIC | Facility: CLINIC | Age: 73
End: 2023-09-21

## 2023-09-21 ENCOUNTER — TELEPHONE (OUTPATIENT)
Age: 73
End: 2023-09-21

## 2023-09-21 LAB
ABO GROUP BLD BPU: NORMAL
ANION GAP SERPL CALCULATED.3IONS-SCNC: 6 MMOL/L
ATRIAL RATE: 105 BPM
ATRIAL RATE: 98 BPM
BPU ID: NORMAL
BUN SERPL-MCNC: 16 MG/DL (ref 5–25)
CALCIUM SERPL-MCNC: 7.1 MG/DL (ref 8.4–10.2)
CHLORIDE SERPL-SCNC: 106 MMOL/L (ref 96–108)
CO2 SERPL-SCNC: 23 MMOL/L (ref 21–32)
CREAT SERPL-MCNC: 0.85 MG/DL (ref 0.6–1.3)
CROSSMATCH: NORMAL
ERYTHROCYTE [DISTWIDTH] IN BLOOD BY AUTOMATED COUNT: 12.8 % (ref 11.6–15.1)
GFR SERPL CREATININE-BSD FRML MDRD: 87 ML/MIN/1.73SQ M
GLUCOSE SERPL-MCNC: 116 MG/DL (ref 65–140)
GLUCOSE SERPL-MCNC: 128 MG/DL (ref 65–140)
GLUCOSE SERPL-MCNC: 130 MG/DL (ref 65–140)
GLUCOSE SERPL-MCNC: 134 MG/DL (ref 65–140)
GLUCOSE SERPL-MCNC: 136 MG/DL (ref 65–140)
GLUCOSE SERPL-MCNC: 138 MG/DL (ref 65–140)
GLUCOSE SERPL-MCNC: 140 MG/DL (ref 65–140)
GLUCOSE SERPL-MCNC: 146 MG/DL (ref 65–140)
HCT VFR BLD AUTO: 39.9 % (ref 36.5–49.3)
HGB BLD-MCNC: 13.9 G/DL (ref 12–17)
MAGNESIUM SERPL-MCNC: 2.3 MG/DL (ref 1.9–2.7)
MCH RBC QN AUTO: 31.6 PG (ref 26.8–34.3)
MCHC RBC AUTO-ENTMCNC: 34.8 G/DL (ref 31.4–37.4)
MCV RBC AUTO: 91 FL (ref 82–98)
P AXIS: 67 DEGREES
PLATELET # BLD AUTO: 136 THOUSANDS/UL (ref 149–390)
PMV BLD AUTO: 9.7 FL (ref 8.9–12.7)
POTASSIUM SERPL-SCNC: 4.5 MMOL/L (ref 3.5–5.3)
PR INTERVAL: 198 MS
QRS AXIS: -26 DEGREES
QRS AXIS: -44 DEGREES
QRSD INTERVAL: 68 MS
QRSD INTERVAL: 74 MS
QT INTERVAL: 318 MS
QT INTERVAL: 330 MS
QTC INTERVAL: 405 MS
QTC INTERVAL: 425 MS
RBC # BLD AUTO: 4.4 MILLION/UL (ref 3.88–5.62)
SODIUM SERPL-SCNC: 135 MMOL/L (ref 135–147)
T WAVE AXIS: 59 DEGREES
T WAVE AXIS: 74 DEGREES
UNIT DISPENSE STATUS: NORMAL
UNIT PRODUCT CODE: NORMAL
UNIT PRODUCT VOLUME: 300 ML
UNIT PRODUCT VOLUME: 350 ML
UNIT RH: NORMAL
VENTRICULAR RATE: 100 BPM
VENTRICULAR RATE: 98 BPM
WBC # BLD AUTO: 16.87 THOUSAND/UL (ref 4.31–10.16)

## 2023-09-21 PROCEDURE — 97163 PT EVAL HIGH COMPLEX 45 MIN: CPT

## 2023-09-21 PROCEDURE — 85027 COMPLETE CBC AUTOMATED: CPT | Performed by: PHYSICIAN ASSISTANT

## 2023-09-21 PROCEDURE — 99223 1ST HOSP IP/OBS HIGH 75: CPT | Performed by: INTERNAL MEDICINE

## 2023-09-21 PROCEDURE — 94664 DEMO&/EVAL PT USE INHALER: CPT

## 2023-09-21 PROCEDURE — 80048 BASIC METABOLIC PNL TOTAL CA: CPT | Performed by: PHYSICIAN ASSISTANT

## 2023-09-21 PROCEDURE — 93005 ELECTROCARDIOGRAM TRACING: CPT

## 2023-09-21 PROCEDURE — 83735 ASSAY OF MAGNESIUM: CPT | Performed by: PHYSICIAN ASSISTANT

## 2023-09-21 PROCEDURE — 93010 ELECTROCARDIOGRAM REPORT: CPT | Performed by: INTERNAL MEDICINE

## 2023-09-21 PROCEDURE — 97166 OT EVAL MOD COMPLEX 45 MIN: CPT

## 2023-09-21 PROCEDURE — 82948 REAGENT STRIP/BLOOD GLUCOSE: CPT

## 2023-09-21 PROCEDURE — 97535 SELF CARE MNGMENT TRAINING: CPT

## 2023-09-21 PROCEDURE — 99024 POSTOP FOLLOW-UP VISIT: CPT | Performed by: THORACIC SURGERY (CARDIOTHORACIC VASCULAR SURGERY)

## 2023-09-21 PROCEDURE — 71045 X-RAY EXAM CHEST 1 VIEW: CPT

## 2023-09-21 RX ORDER — FONDAPARINUX SODIUM 2.5 MG/.5ML
2.5 INJECTION SUBCUTANEOUS DAILY
Status: DISCONTINUED | OUTPATIENT
Start: 2023-09-21 | End: 2023-09-24 | Stop reason: HOSPADM

## 2023-09-21 RX ORDER — MAGNESIUM SULFATE HEPTAHYDRATE 40 MG/ML
2 INJECTION, SOLUTION INTRAVENOUS ONCE
Status: COMPLETED | OUTPATIENT
Start: 2023-09-21 | End: 2023-09-21

## 2023-09-21 RX ORDER — INSULIN LISPRO 100 [IU]/ML
1-6 INJECTION, SOLUTION INTRAVENOUS; SUBCUTANEOUS
Status: DISCONTINUED | OUTPATIENT
Start: 2023-09-21 | End: 2023-09-24 | Stop reason: HOSPADM

## 2023-09-21 RX ORDER — ACETAMINOPHEN 325 MG/1
975 TABLET ORAL EVERY 8 HOURS
Status: DISCONTINUED | OUTPATIENT
Start: 2023-09-21 | End: 2023-09-24 | Stop reason: HOSPADM

## 2023-09-21 RX ORDER — PANTOPRAZOLE SODIUM 40 MG/1
40 TABLET, DELAYED RELEASE ORAL
Status: DISCONTINUED | OUTPATIENT
Start: 2023-09-21 | End: 2023-09-24 | Stop reason: HOSPADM

## 2023-09-21 RX ORDER — DOCUSATE SODIUM 100 MG/1
100 CAPSULE, LIQUID FILLED ORAL 2 TIMES DAILY
Status: DISCONTINUED | OUTPATIENT
Start: 2023-09-21 | End: 2023-09-24 | Stop reason: HOSPADM

## 2023-09-21 RX ORDER — POTASSIUM CHLORIDE 20 MEQ/1
20 TABLET, EXTENDED RELEASE ORAL 2 TIMES DAILY
Status: DISCONTINUED | OUTPATIENT
Start: 2023-09-21 | End: 2023-09-24

## 2023-09-21 RX ORDER — ONDANSETRON 2 MG/ML
4 INJECTION INTRAMUSCULAR; INTRAVENOUS EVERY 6 HOURS PRN
Status: DISCONTINUED | OUTPATIENT
Start: 2023-09-21 | End: 2023-09-24 | Stop reason: HOSPADM

## 2023-09-21 RX ORDER — FUROSEMIDE 10 MG/ML
40 INJECTION INTRAMUSCULAR; INTRAVENOUS
Status: DISCONTINUED | OUTPATIENT
Start: 2023-09-21 | End: 2023-09-22

## 2023-09-21 RX ORDER — TEMAZEPAM 15 MG/1
15 CAPSULE ORAL
Status: DISCONTINUED | OUTPATIENT
Start: 2023-09-21 | End: 2023-09-24 | Stop reason: HOSPADM

## 2023-09-21 RX ORDER — OXYCODONE HYDROCHLORIDE 5 MG/1
5 TABLET ORAL EVERY 4 HOURS PRN
Status: DISCONTINUED | OUTPATIENT
Start: 2023-09-21 | End: 2023-09-24 | Stop reason: HOSPADM

## 2023-09-21 RX ADMIN — FONDAPARINUX SODIUM 2.5 MG: 2.5 INJECTION, SOLUTION SUBCUTANEOUS at 08:31

## 2023-09-21 RX ADMIN — LIDOCAINE HYDROCHLORIDE 100 MG: 20 INJECTION INTRAVENOUS at 02:57

## 2023-09-21 RX ADMIN — TEMAZEPAM 15 MG: 15 CAPSULE ORAL at 21:05

## 2023-09-21 RX ADMIN — HYDROMORPHONE HYDROCHLORIDE 0.5 MG: 1 INJECTION, SOLUTION INTRAMUSCULAR; INTRAVENOUS; SUBCUTANEOUS at 04:46

## 2023-09-21 RX ADMIN — OXYCODONE HYDROCHLORIDE 5 MG: 5 TABLET ORAL at 17:07

## 2023-09-21 RX ADMIN — PANTOPRAZOLE SODIUM 40 MG: 40 TABLET, DELAYED RELEASE ORAL at 06:23

## 2023-09-21 RX ADMIN — ALBUMIN (HUMAN) 12.5 G: 12.5 INJECTION, SOLUTION INTRAVENOUS at 00:09

## 2023-09-21 RX ADMIN — ACETAMINOPHEN 975 MG: 325 TABLET, FILM COATED ORAL at 15:34

## 2023-09-21 RX ADMIN — CEFAZOLIN SODIUM 2000 MG: 2 SOLUTION INTRAVENOUS at 12:09

## 2023-09-21 RX ADMIN — DOCUSATE SODIUM 100 MG: 100 CAPSULE, LIQUID FILLED ORAL at 08:30

## 2023-09-21 RX ADMIN — AMIODARONE HYDROCHLORIDE 200 MG: 200 TABLET ORAL at 21:05

## 2023-09-21 RX ADMIN — AMIODARONE HYDROCHLORIDE 200 MG: 200 TABLET ORAL at 06:23

## 2023-09-21 RX ADMIN — ACETAMINOPHEN 975 MG: 325 TABLET, FILM COATED ORAL at 08:31

## 2023-09-21 RX ADMIN — ATORVASTATIN CALCIUM 80 MG: 80 TABLET, FILM COATED ORAL at 15:35

## 2023-09-21 RX ADMIN — AMIODARONE HYDROCHLORIDE 1 MG/MIN: 50 INJECTION, SOLUTION INTRAVENOUS at 17:55

## 2023-09-21 RX ADMIN — OXYCODONE HYDROCHLORIDE 5 MG: 5 TABLET ORAL at 00:20

## 2023-09-21 RX ADMIN — OXYCODONE HYDROCHLORIDE 5 MG: 5 TABLET ORAL at 21:05

## 2023-09-21 RX ADMIN — AMIODARONE HYDROCHLORIDE 0.5 MG/MIN: 50 INJECTION, SOLUTION INTRAVENOUS at 23:59

## 2023-09-21 RX ADMIN — POTASSIUM CHLORIDE 20 MEQ: 1500 TABLET, EXTENDED RELEASE ORAL at 08:32

## 2023-09-21 RX ADMIN — AMIODARONE HYDROCHLORIDE 150 MG: 50 INJECTION, SOLUTION INTRAVENOUS at 17:41

## 2023-09-21 RX ADMIN — AMIODARONE HYDROCHLORIDE 200 MG: 200 TABLET ORAL at 15:35

## 2023-09-21 RX ADMIN — ACETAMINOPHEN 975 MG: 325 TABLET, FILM COATED ORAL at 23:59

## 2023-09-21 RX ADMIN — MAGNESIUM SULFATE HEPTAHYDRATE 2 G: 40 INJECTION, SOLUTION INTRAVENOUS at 08:32

## 2023-09-21 RX ADMIN — FUROSEMIDE 40 MG: 10 INJECTION, SOLUTION INTRAMUSCULAR; INTRAVENOUS at 08:29

## 2023-09-21 RX ADMIN — Medication 12.5 MG: at 08:32

## 2023-09-21 RX ADMIN — CEFAZOLIN SODIUM 2000 MG: 2 SOLUTION INTRAVENOUS at 03:08

## 2023-09-21 RX ADMIN — DOCUSATE SODIUM 100 MG: 100 CAPSULE, LIQUID FILLED ORAL at 17:07

## 2023-09-21 RX ADMIN — OXYCODONE HYDROCHLORIDE 5 MG: 5 TABLET ORAL at 08:27

## 2023-09-21 RX ADMIN — FUROSEMIDE 40 MG: 10 INJECTION, SOLUTION INTRAMUSCULAR; INTRAVENOUS at 15:36

## 2023-09-21 RX ADMIN — ASPIRIN 325 MG ORAL TABLET 325 MG: 325 PILL ORAL at 08:32

## 2023-09-21 RX ADMIN — POTASSIUM CHLORIDE 20 MEQ: 1500 TABLET, EXTENDED RELEASE ORAL at 17:06

## 2023-09-21 RX ADMIN — HYDROMORPHONE HYDROCHLORIDE 0.5 MG: 1 INJECTION, SOLUTION INTRAMUSCULAR; INTRAVENOUS; SUBCUTANEOUS at 01:15

## 2023-09-21 NOTE — PROGRESS NOTES
-- Patient:  -- MRN: [id number]  -- Aidin Request ID: [id number]  -- Level of care reserved: [address]  -- Partner Reserved: [address] [facility] [place], [address] [facility] [phone]  -- Clinical needs requested:  -- Geography searched: [id number]  -- Start of Service:  -- Request sent: 10:33am EDT on 9/21/2023 by Guicho Gonzalez  -- Partner reserved: 2:27pm EDT on 9/21/2023 by Guicho Gonzalez  -- Choice list shared: 11:27am EDT on 9/21/2023 by Guicho Gonzalez

## 2023-09-21 NOTE — TELEPHONE ENCOUNTER
----- Message from [place], [place] sent at 9/21/2023  8:54 AM EDT -----  Regarding: FW: Hosp follow up appointment    ----- Message -----  From: Roxane Davis  Sent: 9/21/2023   8:47 AM EDT  To: Cardiology [facility]; #  Subject: Hosp follow up appointment                       Patient of Dr. August Mobley will be discharged from [address] [place] [address] and will need a hospital follow up within 2 weeks . S/P CABG  Please contact or office or patient with appointment.

## 2023-09-21 NOTE — TELEPHONE ENCOUNTER
----- Message from Leigh Allen sent at 9/21/2023  8:41 AM EDT -----  Regarding: Hosp follow up appointment  Patient of Dr. Keyona Patel will be discharged from [address] and will need a hospital follow up within 2 weeks . S/P CABG  Please contact or office or patient with appointment.

## 2023-09-21 NOTE — CASE MANAGEMENT
Case Management Assessment & Discharge Planning Note    Patient name Nuzhat Stein  Location [address] [place] [address] 406/[facility] 543-43 MRN [id number]  : 1950 Date 2023       Current Admission Date: 2023  Current Admission Diagnosis:Coronary artery disease involving native coronary artery of native heart without angina pectoris   Patient Active Problem List    Diagnosis Date Noted   • S/P CABG x 2 2023   • ERIC (obstructive sleep apnea) 2019   • Seborrheic keratosis 2018   • Screening for skin condition 2018   • History of skin cancer 2018   • Impaired fasting glucose 2018   • Coronary artery disease involving native coronary artery of native heart without angina pectoris 2018   • Hypertension, essential 2018   • Combined hyperlipidemia 2018      LOS (days): 1  Geometric Mean LOS (GMLOS) (days): 1.80  Days to GMLOS:0.7     OBJECTIVE:    Risk of Unplanned Readmission Score: 10.11         Current admission status: Inpatient       Preferred Pharmacy:   [facility] [address] [address]  Phone: [phone] Fax: [phone]    [id number] [address], [facility] [address] [place] [address]  Phone: [phone] Fax: [phone]    Primary Care Provider: Mukul Mckeon MD    Primary Insurance: MEDICARE  Secondary Insurance: [facility] HEALTH OPTIONS PROGRAM    ASSESSMENT:  Active Health Care Proxies    There are no active Health Care Proxies on file.                  Readmission Root Cause  30 Day Readmission: No    Patient Information  Admitted from[de-identified] Home  Mental Status: Alert  During Assessment patient was accompanied by: Not accompanied during assessment  Assessment information provided by[de-identified] Patient  Primary Caregiver: Self  Support Systems: Self, Spouse/significant other  [place] [facility]: [address] [place] [address] do you live in?: [address] [employer] entry access options.  Select all that apply.: No steps to enter home  Type of Current Residence: 2 story home  Upon entering residence, is there a bedroom on the main floor (no further steps)?: No  A bedroom is located on the following floor levels of residence (select all that apply):: 2nd Floor  Upon entering residence, is there a bathroom on the main floor (no further steps)?: Yes  Number of steps to 2nd floor from main floor: One Flight  In the last 12 months, was there a time when you were not able to pay the mortgage or rent on time?: No  In the last 12 months, how many places have you lived?: 1  In the last 12 months, was there a time when you did not have a steady place to sleep or slept in a shelter (including now)?: No  Homeless/housing insecurity resource given?: N/A  Living Arrangements: Lives w/ Spouse/significant other  Is patient a ?: No    Activities of Daily Living Prior to Admission  Functional Status: Independent  Completes ADLs independently?: Yes  Ambulates independently?: Yes  Does patient use assisted devices?: No  Does patient currently own DME?: No  Does patient have a history of Outpatient Therapy (PT/OT)?: No  Does the patient have a history of Short-Term Rehab?: Yes (Cardiac rehab x2)  Does patient have a history of HHC?: No  Does patient currently have [address]?: No         Patient Information Continued  Income Source: Pension/FDC  Does patient have prescription coverage?: Yes  Within the past 12 months, you worried that your food would run out before you got the money to buy more.: Never true  Within the past 12 months, the food you bought just didn't last and you didn't have money to get more.: Never true  Food insecurity resource given?: N/A  Does patient receive dialysis treatments?: No  Does patient have a history of substance abuse?: No  Does patient have a history of Mental Health Diagnosis?: No         Means of Transportation  Means of Transport to [facility][de-identified] Drives Self  In the past 12 months, has lack of transportation kept you from medical appointments or from getting medications?: No  In the past 12 months, has lack of transportation kept you from meetings, work, or from getting things needed for daily living?: No  Was application for public transport provided?: N/A        DISCHARGE DETAILS:    Discharge planning discussed with[de-identified] Patient  Freedom of Choice: Yes  Comments - Freedom of Choice: Pt agreeable to blanket referrals for [address]  CM contacted family/caregiver?: No- see comments  Were Treatment Team discharge recommendations reviewed with patient/caregiver?: Yes  Did patient/caregiver verbalize understanding of patient care needs?: Yes  Were patient/caregiver advised of the risks associated with not following Treatment Team discharge recommendations?: Yes    Contacts  Patient Contacts: Luly Belcher  Relationship to Patient[de-identified] Family  Contact Method: Phone  Phone Number: [phone]  Reason/Outcome: Emergency [address]         Is the patient interested in [address] at discharge?: Yes  [address] [facility] requested[de-identified] [address] Provider[de-identified] PCP  Home Health Services Needed[de-identified] Post-Op Care and Assessment         Other Referral/Resources/Interventions Provided:  Interventions: [facility]  Referral Comments: Pt agreeable to blanket referrals for [address]         Treatment Team Recommendation: Home with [address] [facility]  Discharge Destination Plan[de-identified] Home with [address] [facility] at Discharge : Family                                      Additional Comments: CM spoke with pt regarding [address] services, pt agreeable to blanket referrals, referrals were made via [address]

## 2023-09-21 NOTE — PROGRESS NOTES
Progress Note - Cardiothoracic Surgery   Darroll Sheets 71 y.o. male MRN: [id number]  Unit/Bed#: [facility] 415-01 Encounter: [id number]    Coronary artery disease. S/P coronary artery bypass grafting; POD # 1    24 Hour Events: Extubated without incident. Remains supported on bradley 30. Brief run VT (resolved after lidocaine bolus).      Medications:   Scheduled Meds:  Current Facility-Administered Medications   Medication Dose Route Frequency Provider Last Rate   • acetaminophen  650 mg Oral Q6H While awake Oral March PA-C     • amiodarone  200 mg Oral [facility] Oral March PA-C     • aspirin  325 mg Oral Daily Oral March PA-C     • atorvastatin  80 mg [facility] [place]     • bisacodyl  10 mg Rectal Daily PRN Oral March PA-C     • cefazolin  2,000 mg Intravenous [facility] Sto[facility] (09/21/23 0400)   • chlorhexidine  15 mL Mouth/Throat BID Oral March PA-C     • fentanyl citrate (PF)  50 mcg Intravenous Once Oral March PA-C     • fondaparinux  2.5 mg Subcutaneous Daily Oral March PA-C     • HYDROmorphone  0.5 mg Intravenous Q2H PRN Oral March PA-C     • insulin regular (HumuLIN R,NovoLIN R) 1 Units/mL in sodium chloride 0.9 % 100 mL infusion  0.3-21 Units/hr Intravenous Titrated Oral March PA-C 1.5 Units/hr (09/21/23 0014)   • lidocaine (cardiac)  100 mg Intravenous Q30 Min PRN Oral March PA-C     • melatonin  6 mg Oral HS BAR Sharpe     • mupirocin  1 Application Nasal [id number] [address] [facility] FERNY Salas     • niCARdipine  2.5-15 mg/hr Intravenous Titrated Oral March PA-C Stopped (09/20/23 1526)   • ondansetron  4 mg Intravenous Q6H PRN Oral March PA-C     • oxyCODONE  5 mg Oral Q4H PRN Oral March PA-C     • oxyCODONE  2.5 mg Oral Q4H PRN Oral March PA-C     • pantoprazole  40 mg Oral Daily Oral March PA-C     • phenylephine   mcg/min Intravenous Titrated Erick Kirkpatrick MD 20 mcg/min (09/21/23 0102)   • polyethylene glycol  17 g Oral Daily [facility], FERNY     • potassium chloride  20 mEq Intravenous Once PRN [facility] FERN[facility]     • potassium chloride  20 mEq Intravenous Q1H PRN [facility] FERN[facility]     • potassium chloride  20 mEq Intravenous Q30 Min PRN [facility] FERN[facility]     • sodium chloride  20 mL/hr Intravenous Continuous [facility] 20 mL/hr (09/20/23 1246)     Continuous Infusions:insulin regular (HumuLIN R,NovoLIN R) 1 Units/mL in sodium chloride 0.9 % 100 mL infusion, 0.3-21 Units/hr, Last Rate: 1.5 Units/hr (09/21/23 0014)  niCARdipine, 2.5-15 mg/hr, Last Rate: Stopped (09/20/23 1526)  phenylephine,  mcg/min, Last Rate: 20 mcg/min (09/21/23 0102)  sodium chloride, 20 mL/hr, Last Rate: 20 mL/hr (09/20/23 1246)      PRN Meds:.•  bisacodyl  •  HYDROmorphone  •  lidocaine (cardiac)  •  ondansetron  •  oxyCODONE  •  oxyCODONE  •  potassium chloride  •  potassium chloride  •  potassium chloride    Vitals:   Vitals:    09/21/23 0000 09/21/23 0102 09/21/23 0434 09/21/23 0600   BP:  102/64     Pulse: 101 103 97    Resp: 16  22    Temp: 98.8 °F (37.1 °C)  99.1 °F (37.3 °C)    TempSrc: Core      SpO2: 94%  94%    Weight:    110 kg (242 lb 4.6 oz)   Height:           Hemodynamics:  PAP: (21-32)/(4-18) 26/11  CO:  [5.3 L/min-7.4 L/min] 5.8 L/min  CI:  [2.5 L/min/m2-3.3 L/min/m2] 2.6 L/min/m2    Respiratory:   SpO2: SpO2: 94 %, SpO2 Activity: SpO2 Activity: At Rest; 2 LPM    Intake/Output:   I/O       09/19 0701 09/20 0700 09/20 0701 09/21 0700    P. O.  240    I.V. (mL/kg)  5243.3 (47.7)    IV Piggyback  1700    Cell Saver  750    Total Intake(mL/kg)  7933.3 (72.1)    Urine (mL/kg/hr)  2760 (1)    Blood  500    Chest Tube  615    Total Output  3875    Net  +4058.3                Chest tube Output:    Mediastinal tubes: 350 mL/8 hours  565 mL/24 hours   Pleural tubes: 40 mL/8 hours  50 mL/24 hours     Weights:   Weight (last 2 days)     Date/Time Weight 09/21/23 0600 110 (242.29)    09/20/23 0643 103 (228)          Results:   Results from last 7 days   Lab Units 09/21/23 0429 09/20/23 2027 09/20/23  1221 09/20/23  1219 09/20/23  1104 09/20/23  1103   WBC Thousand/uL 16.87*  --   --   --   --   --    HEMOGLOBIN g/dL 13.9 15.4  --  15.2  --   --    I STAT HEMOGLOBIN g/dl  --   --  12.9  --    < >  --    HEMATOCRIT % 39.9 43.4  --  41.2  --   --    HEMATOCRIT, ISTAT %  --   --  38  --    < >  --    PLATELETS Thousands/uL 136*  --   --  122*  --  128*    < > = values in this interval not displayed. Results from last 7 days   Lab Units 09/21/23 0429 09/20/23 2027 09/20/23  1619 09/20/23  1221 09/20/23  1219   SODIUM mmol/L 135  --   --   --  137   POTASSIUM mmol/L 4.5 4.6 4.3  --  4.5   CHLORIDE mmol/L 106  --   --   --  109*   CO2 mmol/L 23  --   --   --  23   CO2, I-STAT mmol/L  --   --   --  23  --    BUN mg/dL 16  --   --   --  12   CREATININE mg/dL 0.85  --   --   --  0.74   GLUCOSE, ISTAT mg/dl  --   --   --  142*  --    CALCIUM mg/dL 7.1*  --   --   --  8.0*         Point of care glucose: 116-138    Studies:  CXR: Unavailable for review, 2/2 PAC/EPIC down time; will follow. EKG: NSR. No ischemic changes noted. I have personally reviewed pertinent reports.    and I have personally reviewed pertinent films in PACS    Invasive Lines/Tubes:  Invasive Devices     Central Venous Catheter Line  Duration           CVC Central Lines 09/20/23 Triple <1 day    Introducer 09/20/23 <1 day          Peripheral Intravenous Line  Duration           Peripheral IV 09/20/23 Left Antecubital <1 day          Arterial Line  Duration           Arterial Line 09/20/23 Right Radial <1 day          Line  Duration           Pacer Wires <1 day    Pacer Wires <1 day          Drain  Duration           Chest Tube 1 Left Pleural 32 Fr. <1 day    Chest Tube 2 Mediastinal;Posterior 32 Fr. <1 day    Chest Tube 3 Mediastinal;Anterior 32 Fr. <1 day    Urethral Catheter Non-latex;Straight-tip; Temperature probe 16 Fr. <1 day                Physical Exam:    HEENT/NECK:  Normocephalic. Atraumatic. No jugular venous distention. Cardiac: Regular rate and rhythm and No murmurs/rubs/gallops  Pulmonary:  Breath sounds clear bilaterally and No rales/rhonchi/wheezes  Abdomen:  Non-tender, Non-distended and Normal bowel sounds  Incisions: Sternum is stable. Incision dressed with Acticoat. No erythema or drainage and Saphenectomy incision dressed with Acticoat. No erythema or drainage  Extremities: Extremities warm/dry and 1+ edema B/L  Neuro: Alert and oriented X 3, Sensation is grossly intact and No focal deficits  Skin: Warm/Dry, without rashes or lesions. Assessment:  Principal Problem:    Coronary artery disease involving native coronary artery of native heart without angina pectoris  Active Problems:    Hypertension, essential    Combined hyperlipidemia    Impaired fasting glucose    ERIC (obstructive sleep apnea)    S/P CABG x 2       Coronary artery disease. S/P coronary artery bypass grafting; POD # 1    Plan:    1. Cardiac:   Cardiac infusions: Neosynephrine, 30 mcg/min   Wean as tolerated   Cardiac index > 2.2  D/C [place] Dina catheter   NSR with brief VT run overnight  Start Lopressor, 12.5mg PO BID  Continue prophylactic Amiodarone, 200 mg PO TID    Continue ASA and Statin therapy  Maintain epicardial pacing wires for beta blocker initiation  Maintain central IV access today for medications requiring central IV access  Continue DVT prophylaxis  Consult cardiology for postoperative medical management    2. Pulmonary:   Extubated  CXR findings: Pending  Acute post-op pulmonary insufficiency; Requiring 3 liters via nasal cannula, secondary to poor inspiratory effort secondary to pain. Continue incentive spirometry/coughing/deep breathing exercises.   Wean supplemental oxygen as tolerated for saturation > 90%  Chest tube output remains persistently high; Continue chest tubes to suction today    3. Renal:   Post op Creatinine stable; Follow up labs prn   Intake/Output net: + 4000 mL/24 hours  Post op volume overload:    Start IV Lasix, 40 mg BID  Start Potassium supplementation, 20 mEq BID  Discontinue potassium replacement scales  Discontinue quarles catheter    4. Neuro:  Neurologically intact; No active issues  Incisional pain well-controlled  Continue Tylenol, 975 mg PO q 8, standing dose  Continue Oxycodone, 2.5 to 5 mg PO q 4 hours prn pain    5. GI:  Tolerating clear liquid diet, without evidence of dysphagia; Initiate oral diet with 1800 mL fluid restriction  Continue stool softeners and prn suppository  Continue GI prophylaxis    6. Endo:   Glucose well-controlled. Discontinue continuous insulin infusion and add Insulin sliding scale coverage    7. Hematology:   Post-operative blood count acceptable; Trend prn  Thrombocytopenia; Platelet 737   F/U AM CBC    8. Disposition:  Transfer from ICU to Stepdown level of care today  PT/OT consulted for recommendations regarding home vs. rehab, when medically cleared for discharge  Routine postoperative recovery to this point;  Anticipated date of discharge: 9/23    VTE Pharmacologic Prophylaxis: Sequential compression device (Venodyne)  and Fondaparinux (Arixtra)  VTE Mechanical Prophylaxis: sequential compression device    Collaborative rounds completed with supervising physician and with the bedside nurse    SIGNATURE: Colt Burgess PA-C  DATE: September 21, 2023  TIME: 6:53 AM

## 2023-09-21 NOTE — CASE MANAGEMENT
Case Management Discharge Planning Note    Patient name Yulisa Mora  Location [address] [place] [address] Road 406/[facility] 435-27 MRN [id number]  : 1950 Date 2023       Current Admission Date: 2023  Current Admission Diagnosis:Coronary artery disease involving native coronary artery of native heart without angina pectoris   Patient Active Problem List    Diagnosis Date Noted   • S/P CABG x 2 2023   • ERIC (obstructive sleep apnea) 2019   • Seborrheic keratosis 2018   • Screening for skin condition 2018   • History of skin cancer 2018   • Impaired fasting glucose 2018   • Coronary artery disease involving native coronary artery of native heart without angina pectoris 2018   • Hypertension, essential 2018   • Combined hyperlipidemia 2018      LOS (days): 1  Geometric Mean LOS (GMLOS) (days): 8.30  Days to GMLOS:7     OBJECTIVE:  Risk of Unplanned Readmission Score: 10.03         Current admission status: Inpatient   Preferred Pharmacy:   [facility], [address] [address]  Phone: [phone] Fax: [phone]    [id number] [address]  [address] [place] [address], [address] [facility]  Phone: [phone] Fax: [phone]    Primary Care Provider: William Waddlel MD    Primary Insurance: MEDICARE  Secondary Insurance: [facility] HEALTH OPTIONS PROGRAM    DISCHARGE DETAILS:    Discharge planning discussed with[de-identified] Pt and pt's wife  Freedom of Choice: Yes  Comments - Freedom of Choice: Pt provided with list of [facility], [facility]  CM contacted family/caregiver?: Yes  Were Treatment Team discharge recommendations reviewed with patient/caregiver?: Yes  Did patient/caregiver verbalize understanding of patient care needs?: Yes  Were patient/caregiver advised of the risks associated with not following Treatment Team discharge recommendations?: Yes    Contacts  Patient Contacts: Shruthi Garcia  Relationship to Patient[de-identified] Family  Contact Method:  In Person  Phone Number: [phone]  Reason/Outcome: Emergency Contact, Discharge [address]         Is the patient interested in [address] at discharge?: Yes  [facility] requested[de-identified] [facility]  HHA External Referral Reason (only applicable if external HHA name selected): Patient refused suggestion for recommended provider  [address] [place] [address] Provider[de-identified] PCP  Home Health Services Needed[de-identified] Post-Op Care and Assessment    DME Referral Provided  Referral made for DME?: No    Other Referral/Resources/Interventions Provided:  Interventions: [address]  Referral Comments: [facility] [address]         Treatment Team Recommendation: Home with [address] [facility]  Discharge Destination Plan[de-identified] Home with [address] [facility] at Discharge : Family

## 2023-09-21 NOTE — PLAN OF CARE
Problem: OCCUPATIONAL THERAPY ADULT  Goal: Performs self-care activities at highest level of function for planned discharge setting. See evaluation for individualized goals. Description: Treatment Interventions: ADL retraining, Functional transfer training, Cardiac education, Energy conservation, Activityengagement, Continued evaluation, Patient/family training, Endurance training          See flowsheet documentation for full assessment, interventions and recommendations. Outcome: Progressing  Note: Limitation: Decreased ADL status, Decreased endurance, Decreased self-care trans, Decreased high-level ADLs  Prognosis: Good  Assessment: Pt is a 71 y.o. male who was admitted to [address] [place] [address] on 9/20/2023 with Coronary artery disease involving native coronary artery of native heart without angina pectoris, s/p CABG. Pt seen for an OT evaluation per active OT orders, 2 CT in place, cardiac pxns. 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 Pt lives with spouse in a 2  with 0 FRANK, FF to 2nd fl although pt plans to sleep on a recliner on 1st fl following initial D/C, uses a tub shower and standard toilet. Pta, pt was independent w/ ADL/IADL and functional mobility, was (+) driving and was not using any DME at baseline. Currently, pt is Min Ax1 for UB ADL, Min Ax1 for LB ADL, and completed transfers/FM w Min Ax1. Pt also completed additional tx session focused on cardiac education packet Recovering from you Cardiac Surgery, verbalizes understanding. Pt currently presents with impairments in the following categories -difficulty performing ADLS activity tolerance and endurance.  These impairments, as well as pt's fatigue, pain and cardiac/sternal precautions  limit pt's ability to safely engage in all baseline areas of occupation, includingbathing, dressing, toileting and functional mobility/transfers. The patient's raw score on the AM-PAC Daily Activity Inpatient Short Form is 20. A raw score of greater than or equal to 19 suggests the patient may benefit from discharge to home. Please refer to the recommendation of the Occupational Therapist for safe discharge planning. Pt is mainly limited by pain and multiple lines and has adequate assist at home. Pt would benefit from continued acute OT services throughout hospital course. Plan for OT interventions 2-3x per week. From OT standpoint, recommend home with no further rehab needs upon D/C. Pt was left seated in bedside chair with all needs within reach and family present.      OT Discharge Recommendation: No rehabilitation needs

## 2023-09-21 NOTE — PHYSICAL THERAPY NOTE
Physical Therapy Evaluation     Patient's Name: Saloni Luna    Admitting Diagnosis  Coronary artery disease involving native coronary artery of native heart without angina pectoris [I25.10]    Problem List  Patient Active Problem List   Diagnosis    Coronary artery disease involving native coronary artery of native heart without angina pectoris    Hypertension, essential    Combined hyperlipidemia    Impaired fasting glucose    Seborrheic keratosis    Screening for skin condition    History of skin cancer    ERIC (obstructive sleep apnea)    S/P CABG x 2       Past Medical History  Past Medical History:   Diagnosis Date    Abnormal LFTs     Benign neoplasm of skin     Lumbar canal stenosis     with neurogenic claudication     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, congenital     Nephrotic syndrome     Nonmelanoma skin cancer     last assessed 1/11/18    Old myocardial infarction     Skin cancer     last assessed 1/13/14    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        Past Surgical History  Past Surgical History:   Procedure Laterality Date    ATHERECTOMY      1 with stent placement  last assessed 6/17/14    CARDIAC CATHETERIZATION      outcome: successful  last assessed 6/17/14    CARDIAC CATHETERIZATION Left 9/6/2023    Procedure: Cardiac Left Heart Cath;  Surgeon: Ning Cruz MD;  Location: [address] CATH LAB; Service: Cardiology    CARDIAC CATHETERIZATION N/A 9/6/2023    Procedure: Cardiac Coronary Angiogram;  Surgeon: Ning Cruz MD;  Location: [address] CATH LAB; Service: Cardiology    CARDIAC CATHETERIZATION  9/6/2023    Procedure: Cardiac catheterization;  Surgeon: Ning Curz MD;  Location: [address] CATH LAB;   Service: Cardiology    CORONARY ANGIOPLASTY      CORONARY ANGIOPLASTY WITH STENT PLACEMENT      to LAD, diagonal and RCA    MALIGNANT SKIN LESION EXCISION  01/09/2012    Trunk,  SCC chest     [place] CORONARY ARTERY BYP W/VEIN & ARTERY GRAFT 3 VEIN N/A 9/20/2023    Procedure: CORONARY ARTERY BYPASS GRAFT (CABG) X2 VESSELS, LIMA - LAD,  LEFT LEG EVH - PDA , W/ CALOS;  Surgeon: Shagufta Gaviria MD;  [facility];  Service: Cardiac Surgery          09/21/23 1057   PT Last Visit   PT Visit Date 09/21/23   Note Type   Note type Evaluation   Pain Assessment   Pain Assessment Tool FLACC   Pain Location/Orientation Orientation: Mid;Location: Chest;Location: Incision   Pain Onset/Description Onset: Ongoing;Frequency: Intermittent; Descriptor: Aching;Descriptor: Discomfort   Effect of Pain on Daily Activities guarding   Patient's Stated Pain Goal No pain   Hospital Pain Intervention(s) Repositioned; Ambulation/increased activity; Emotional support   Pain Rating: FLACC (Rest) - Face 0   Pain Rating: FLACC (Rest) - Legs 0   Pain Rating: FLACC (Rest) - Activity 0   Pain Rating: FLACC (Rest) - Cry 0   Pain Rating: FLACC (Rest) - Consolability 0   Score: FLACC (Rest) 0   Pain Rating: FLACC (Activity) - Face 1   Pain Rating: FLACC (Activity) - Legs 0   Pain Rating: FLACC (Activity) - Activity 0   Pain Rating: FLACC (Activity) - Cry 1   Pain Rating: FLACC (Activity) - Consolability 1   Score: FLACC (Activity) 3   Restrictions/Precautions   Braces or Orthoses   (denies)   Other Precautions Cardiac/sternal;Multiple lines;Telemetry;O2  (CT; a-line)   Home Living   Type of Home House   Home Layout Two level; Able to live on main level with bedroom/bathroom  (1st floor set-up; 1 small FRANK)   Prior Function   Level of [place] Independent with functional mobility  (amb w/o AD)   Lives With Spouse   General   Additional Pertinent History cleared for assessment by nsg   Family/Caregiver Present Yes   Cognition   Overall Cognitive Status WFL   Arousal/Participation Alert   Attention Within functional limits   Orientation Level Oriented to person;Oriented to place;Oriented to situation   Memory Within functional limits   Following Commands Follows one step commands without difficulty   Subjective   Subjective Alert; in the chair; agreeable to mobilize   RUE Assessment   RUE Assessment WFL  (AROM)   LUE Assessment   LUE Assessment WFL  (AROM)   RLE Assessment   RLE Assessment WFL  (AROM)   Strength RLE   RLE Overall Strength   (good -)   LLE Assessment   LLE Assessment WFL  (AROM)   Strength LLE   LLE Overall Strength   (good -)   Transfers   Sit to Stand 4  Minimal assistance   Additional items Assist x 1;Verbal cues   Stand to Sit 4  Minimal assistance   Additional items Assist x 1;Verbal cues   Ambulation/Elevation   Gait pattern Excessively slow; Short stride; Inconsistent abilio   Gait Assistance 4  Minimal assist   Additional items Assist x 1;Verbal cues; Not tested   Assistive Device Rolling walker   Distance 140 ft   Balance   Static Sitting Fair +   Dynamic Sitting Fair   Static Standing Fair -   Dynamic Standing Poor +   Ambulatory Poor +   Activity Tolerance   Activity Tolerance Patient limited by fatigue   Medical Staff Made Aware Co-eval performed w/ OTR due to complexity of medical status   Nurse Made Aware spoke to SUSAN Hummel   Assessment   Prognosis Good   Problem List Decreased strength;Decreased endurance; Impaired balance;Decreased mobility;Obesity   Assessment Pt is 71 y.o. male admitted with Dx of Coronary artery disease involving native coronary artery of native heart without angina pectoris and underwent Coronary artery bypass grafting x 2 with left internal mammary artery to left anterior descending, saphenous vein graft to right posterior descending artery on 9/20/2023. Pt 's comorbidities affecting POC include: Lumbar canal stenosis, Nephrotic syndrome, ERIC and HTN and personal factors of: FRANK and steps in the house (although plans to stay on the 1st floor in a recliner). Pt's clinical presentation is currently unstable/unpredictable which is evident in ongoing telemetry monitoring w/ a-line in place, abnormal lab values being monitored/trending, suppl O2 needs and CT in place.  Pt presents w/ postop guarding, generalized weakness, incl decreased LE strength, decreased functional endurance and activity tolerance, and min impaired balance w/ associated gait deviations requiring use of rw at this time. Will cont to follow pt in PT for progressive mobilization to address above functional deficits and to max level of (I), endurance, and safety. Otherwise, anticipate pt will return home w/ available family support upon D/C provided he cont improving w/ mobility skills, safety, and endurance and when medically cleared; will follow. Goals   Patient Goals to feel better   STG Expiration Date 10/01/23   Short Term Goal #1 7-10 days. Pt will amb 300 ft w/ least restrictive assistive device PRN, mod (I) in order to facilitate safe return to premorbid environment and community amb status. Pt will negotiate 1 step w/ appropriate AD PRN, (S)x1 and 12 steps w/ hand rail and SPC PRN, mod (I) in order to navigate in and out of the house and between levels of home environment safely. Pt will achieve (I) level w/ bed mob in order to facilitate safety with OOB and back to bed transitions in own living environment. Pt will perform transfers w/ mod (I) to assure (I) and safety w/ functional mobility/transitions w/ all aspects of mobility/locomotion. Pt will participate in LE therex and balance activities to max progression w/ mobility skills. PT Treatment Day 0   Plan   Treatment/Interventions Functional transfer training;LE strengthening/ROM; Elevations; Therapeutic exercise; Endurance training;Equipment eval/education; Bed mobility;Gait training;Spoke to nursing;Spoke to case management;OT;Family   PT Frequency 4-6x/wk   Recommendation   PT Discharge Recommendation No rehabilitation needs   Equipment Recommended Walker  (at this time)   [address] walker   AM-PAC Basic Mobility Inpatient   Turning in Flat Bed Without Bedrails 3   Lying on Back to Sitting on Edge of Flat Bed Without Bedrails 2   Moving Bed to Chair 3 Standing Up From Chair Using Arms 3   Walk in Room 3   Climb 3-5 Stairs With Railing 2   Basic Mobility Inpatient Raw Score 16   Basic Mobility Standardized Score 38.32   Highest Level Of Mobility   -[facility] [place] Goal 5: Stand one or more mins   -HLM Achieved 7: Walk 25 feet or more   Modified Rutherford Scale   Modified Rutherford Scale 4   End of Consult   Patient Position at End of Consult Bedside chair; All needs within reach         [facility], PT

## 2023-09-21 NOTE — PLAN OF CARE
Problem: PHYSICAL THERAPY ADULT  Goal: Performs mobility at highest level of function for planned discharge setting. See evaluation for individualized goals. Description: Treatment/Interventions: Functional transfer training, LE strengthening/ROM, Elevations, Therapeutic exercise, Endurance training, Equipment eval/education, Bed mobility, Gait training, Spoke to nursing, Spoke to case management, OT, Family  Equipment Recommended: Earl Bernal (at this time)       See flowsheet documentation for full assessment, interventions and recommendations. Note: Prognosis: Good  Problem List: Decreased strength, Decreased endurance, Impaired balance, Decreased mobility, Obesity  Assessment: Pt is 71 y.o. male admitted with Dx of Coronary artery disease involving native coronary artery of native heart without angina pectoris and underwent Coronary artery bypass grafting x 2 with left internal mammary artery to left anterior descending, saphenous vein graft to right posterior descending artery on 9/20/2023. Pt 's comorbidities affecting POC include: Lumbar canal stenosis, Nephrotic syndrome, ERIC and HTN and personal factors of: FRANK and steps in the house (although plans to stay on the 1st floor in a recliner). Pt's clinical presentation is currently unstable/unpredictable which is evident in ongoing telemetry monitoring w/ a-line in place, abnormal lab values being monitored/trending, suppl O2 needs and CT in place. Pt presents w/ postop guarding, generalized weakness, incl decreased LE strength, decreased functional endurance and activity tolerance, and min impaired balance w/ associated gait deviations requiring use of rw at this time. Will cont to follow pt in PT for progressive mobilization to address above functional deficits and to max level of (I), endurance, and safety.  Otherwise, anticipate pt will return home w/ available family support upon D/C provided he cont improving w/ mobility skills, safety, and endurance and when medically cleared; will follow. PT Discharge Recommendation: No rehabilitation needs    See flowsheet documentation for full assessment. Statement Selected

## 2023-09-21 NOTE — RESTORATIVE TECHNICIAN NOTE
Restorative Technician Note      Patient Name: Sebastian Ewing     Restorative Tech Visit Date: 09/21/23  Note Type: Mobility  Patient Position Upon Consult: Bedside chair  Activity Performed: Ambulated  Assistive Device: Roller walker  Patient Position at End of Consult: All needs within reach;  Bedside chair

## 2023-09-21 NOTE — TELEPHONE ENCOUNTER
Yes, hi, this is [facility] calling from [facility], [phone]. There is a patient of Doctor Jauregui that will be discharged from [address] [place] [address]. He had open heart surgery and he will be going home tomorrow. He needs to transition a care appointment within one week of discharge. So I was calling to help set that up for him. If you can give me a call with that appointment information, our number is [phone] or if you can make the appointment that way, I will be able to see it at [facility] and I can add it to his discharge information. Again, this is Kyree from [facility] office. The office of Doctor Cade Mike, [phone], Patients name is Dominique Siegelic, last name is Stephanie Fong. Birth date [birth year]. Thank you.

## 2023-09-21 NOTE — PLAN OF CARE
Problem: Prexisting or High Potential for Compromised Skin Integrity  Goal: Skin integrity is maintained or improved  Description: INTERVENTIONS:  - Identify patients at risk for skin breakdown  - Assess and monitor skin integrity  - Assess and monitor nutrition and hydration status  - Monitor labs   - Assess for incontinence   - Turn and reposition patient  - Assist with mobility/ambulation  - Relieve pressure over bony prominences  - Avoid friction and shearing  - Provide appropriate hygiene as needed including keeping skin clean and dry  - Evaluate need for skin moisturizer/barrier cream  - Collaborate with interdisciplinary team   - Patient/family teaching  - Consider wound care consult   Outcome: Progressing     Problem: MOBILITY - ADULT  Goal: Maintain or return to baseline ADL function  Description: INTERVENTIONS:  -  Assess patient's ability to carry out ADLs; assess patient's baseline for ADL function and identify physical deficits which impact ability to perform ADLs (bathing, care of mouth/teeth, toileting, grooming, dressing, etc.)  - Assess/evaluate cause of self-care deficits   - Assess range of motion  - Assess patient's mobility; develop plan if impaired  - Assess patient's need for assistive devices and provide as appropriate  - Encourage maximum independence but intervene and supervise when necessary  - Involve family in performance of ADLs  - Assess for home care needs following discharge   - Consider OT consult to assist with ADL evaluation and planning for discharge  - Provide patient education as appropriate  Outcome: Progressing  Goal: Maintains/Returns to pre admission functional level  Description: INTERVENTIONS:  - Perform BMAT or MOVE assessment daily.   - Set and communicate daily mobility goal to care team and patient/family/caregiver. - Collaborate with rehabilitation services on mobility goals if consulted  - Perform Range of Motion 4 times a day.   - Reposition patient every 2 hours.  - Dangle patient 4 times a day  - Stand patient 4 times a day  - Ambulate patient 4 times a day  - Out of bed to chair 4 times a day   - Out of bed for meals 3 times a day  - Out of bed for toileting  - Record patient progress and toleration of activity level   Outcome: Progressing     Problem: Nutrition/Hydration-ADULT  Goal: Nutrient/Hydration intake appropriate for improving, restoring or maintaining nutritional needs  Description: Monitor and assess patient's nutrition/hydration status for malnutrition. Collaborate with interdisciplinary team and initiate plan and interventions as ordered. Monitor patient's weight and dietary intake as ordered or per policy. Utilize nutrition screening tool and intervene as necessary. Determine patient's food preferences and provide high-protein, high-caloric foods as appropriate.      INTERVENTIONS:  - Monitor oral intake, urinary output, labs, and treatment plans  - Assess nutrition and hydration status and recommend course of action  - Evaluate amount of meals eaten  - Assist patient with eating if necessary   - Allow adequate time for meals  - Recommend/ encourage appropriate diets, oral nutritional supplements, and vitamin/mineral supplements  - Order, calculate, and assess calorie counts as needed  - Recommend, monitor, and adjust tube feedings and TPN/PPN based on assessed needs  - Assess need for intravenous fluids  - Provide specific nutrition/hydration education as appropriate  - Include patient/family/caregiver in decisions related to nutrition  Outcome: Progressing     Problem: PAIN - ADULT  Goal: Verbalizes/displays adequate comfort level or baseline comfort level  Description: Interventions:  - Encourage patient to monitor pain and request assistance  - Assess pain using appropriate pain scale  - Administer analgesics based on type and severity of pain and evaluate response  - Implement non-pharmacological measures as appropriate and evaluate response  - Consider cultural and social influences on pain and pain management  - Notify physician/advanced practitioner if interventions unsuccessful or patient reports new pain  Outcome: Progressing     Problem: INFECTION - ADULT  Goal: Absence or prevention of progression during hospitalization  Description: INTERVENTIONS:  - Assess and monitor for signs and symptoms of infection  - Monitor lab/diagnostic results  - Monitor all insertion sites, i.e. indwelling lines, tubes, and drains  - Monitor endotracheal if appropriate and nasal secretions for changes in amount and color  - [place] appropriate cooling/warming therapies per order  - Administer medications as ordered  - Instruct and encourage patient and family to use good hand hygiene technique  - Identify and instruct in appropriate isolation precautions for identified infection/condition  Outcome: Progressing  Goal: Absence of fever/infection during neutropenic period  Description: INTERVENTIONS:  - Monitor WBC    Outcome: Progressing     Problem: SAFETY ADULT  Goal: Maintain or return to baseline ADL function  Description: INTERVENTIONS:  -  Assess patient's ability to carry out ADLs; assess patient's baseline for ADL function and identify physical deficits which impact ability to perform ADLs (bathing, care of mouth/teeth, toileting, grooming, dressing, etc.)  - Assess/evaluate cause of self-care deficits   - Assess range of motion  - Assess patient's mobility; develop plan if impaired  - Assess patient's need for assistive devices and provide as appropriate  - Encourage maximum independence but intervene and supervise when necessary  - Involve family in performance of ADLs  - Assess for home care needs following discharge   - Consider OT consult to assist with ADL evaluation and planning for discharge  - Provide patient education as appropriate  Outcome: Progressing  Goal: Patient will remain free of falls  Description: INTERVENTIONS:  - Educate patient/family on patient safety including physical limitations  - Instruct patient to call for assistance with activity   - Consult OT/PT to assist with strengthening/mobility   - Keep Call bell within reach  - Keep bed low and locked with side rails adjusted as appropriate  - Keep care items and personal belongings within reach  - Initiate and maintain comfort rounds  - Make Fall Risk Sign visible to staff  - Offer Toileting every 2 Hours, in advance of need  - Obtain necessary fall risk management equipment: roller walker  - Apply yellow socks and bracelet for high fall risk patients  - Consider moving patient to room near nurses station  Outcome: Progressing

## 2023-09-21 NOTE — CONSULTS
Consultation - Cardiology Team One  Espinoza Dupree 71 y.o. male MRN: [id number]  Unit/Bed#: [facility] 415-01 Encounter: [id number]    Inpatient consult to Cardiology  Consult performed by: BAR Mccormick  Consult ordered by: Marcela Khan PA-C      Physician Requesting Consult: Nolan Raymond MD  Reason for Consult / Principal Problem: s/p CABG    Assessment    1. MVCAD s/p CABG x 2 LIMA to LAD and SVG to RPDA 9/20/23  POD #1. Just recently weaned off vasopressors. Remains on 1-2LNC  Intra op CALOS- LVEF 45%  Post op rhythm management: Amiodarone 200 mg TID  ECG- NSR with low voltage QRS  Tele reviewed- sinus tachycardia, heart rate low 100s  Post op volume management per CT surgery- Lasix 40 mg IV BID started today  On ASA, high intensity statin, and low dose beta blocker    2. Ischemic cardiomyopathy, LVEF 45%  PTA GDMT- lisinopril 20 mg daily and Toprol XL 50 mg daily    3. CAD s/p prior PCI (LAD 2000, D1 2007, RCA 2010)    4. HTN- hypotensive post op, bradley-synephrine just turned off at time of my exam. SBP in the 110s. No lightheadedness or dizziness. Home Rx: Lisinopril 20 mg daily and Toprol XL 50 mg daily    5. HLD- LDL 83, on atorvastatin 80 mg daily (only on 20 mg daily at home)    6. ERIC- CPAP at HS    Plan/Discussion  · Doing well postoperatively, pain generally controlled and ambulated well in the dugan earlier  · Recently weaned off vasopressors, continue to monitor blood pressure closely  · Continue telemetry monitoring  · IV diuretics per primary team, follow strict I/O's, daily standing weights, a.m.  BMP  · Given cardiomyopathy, recommend using Toprol XL at discharge as well as resuming lisinopril prior to discharge if BP able to tolerate     History of Present Illness   HPI: Espinoza Dupree is a 71y.o. year old male with CAD s/p prior PCI (3 stents- 4/00 LAD, 1/07 D1, 4/26/10 RCA), HTN, HLD, ERIC, family history of premature CAD, and morbid obesity who follows with cardiologist  Eve. In June he admitted to shortness of breath with heavy exertion and underwent a scan Myoview stress test which showed a moderate fixed apical defect and a moderate fixed septal defect, mild intensity small mid anterior wall reversible defect consistent with mid anterior wall ischemia. There was apical hypokinesis and generalized hypokinesis with an EF of 44%. TTE performed later in the month showed LVEF 40% with regional variation, grde 1 diastolic dysfunction, and mild MR. He was referred to Dr. Dane Burgess to discuss cardiac catheterization but he initially declined given that he was not having any symptoms and was going on a cruise. He ultimately underwent cardiac catheterization on 9/6/2023 demonstrating multivessel coronary artery disease with a 55% proximal LAD lesion, 85% mid LAD lesion, 70% D1, 100% D2, 75% proximal and distal RCA lesions, and an 80% RPDA lesion. He was referred to CT surgery for CABG evaluation. Scented to [facility] on 9/20/2023 for elective CABG x2 with LIMA to LAD and SVG to RPDA with Dr. Shivam Faith. No intraoperative CALOS showed LVEF 45%. He is postop day #1 and has already moved out of the ICU to the telemetry floor to continue his recovery. Blood pressures have been soft and he has been on bradley-synephrine overnight. He has been weaned to 1 L nasal cannula. Postoperative labs are stable with normal renal function-creatinine 0.85, mild leukocytosis with a white count of 16 K, and hemoglobin 13.9. Cardiology has been consulted as part of routine postoperative management. EKG reviewed personally: NSR, low voltage QRS    Telemetry reviewed personally: Sinus tachycardia, heart rate low 100s    Review of Systems   Constitutional: Negative for chills, decreased appetite and malaise/fatigue. Cardiovascular: Positive for chest pain. Negative for dyspnea on exertion, leg swelling, orthopnea, palpitations and syncope. Respiratory: Positive for shortness of breath.  Negative for cough, sleep disturbances due to breathing and sputum production. Gastrointestinal: Negative for bloating, nausea and vomiting. Neurological: Negative for dizziness, light-headedness and weakness. Psychiatric/Behavioral: Negative for altered mental status. All other systems reviewed and are negative. 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      Past Surgical History:   Procedure Laterality Date   • ATHERECTOMY      1 with stent placement  last assessed 6/17/14   • CARDIAC CATHETERIZATION      outcome: successful  last assessed 6/17/14   • CARDIAC CATHETERIZATION Left 9/6/2023    Procedure: Cardiac Left Heart Cath;  Surgeon: Ning Cruz MD;  Location: [address] [place] [address] CATH LAB; Service: Cardiology   • CARDIAC CATHETERIZATION N/A 9/6/2023    Procedure: Cardiac Coronary Angiogram;  Surgeon: Ning Cruz MD;  Location: [address] CATH LAB; Service: Cardiology   • CARDIAC CATHETERIZATION  9/6/2023    Procedure: Cardiac catheterization;  Surgeon: Ning Cruz MD;  Location: [address] CATH LAB;   Service: Cardiology   • CORONARY ANGIOPLASTY     • CORONARY ANGIOPLASTY WITH STENT PLACEMENT      to LAD, diagonal and RCA   • MALIGNANT SKIN LESION EXCISION  01/09/2012    Trunk,  SCC chest      Social History     Substance and Sexual Activity   Alcohol Use Not Currently   • Alcohol/week: 14.0 standard drinks of alcohol   • Types: 14 Glasses of wine per week    Comment: Wine consumption (__glasses/day)      Social History     Substance and Sexual Activity   Drug Use No     Social History     Tobacco Use   Smoking Status Never   Smokeless Tobacco Never     Family History:   Family History   Problem Relation Age of Onset   • Heart attack Father 50   • Cancer Father    • Coronary artery disease Family        Meds/Allergies   all current active meds have been reviewed and current meds:   Current Facility-Administered Medications   Medication Dose Route Frequency   • acetaminophen (TYLENOL) tablet 975 mg  975 mg Oral Q8H   • amiodarone tablet 200 mg  200 mg Oral Q8H [address]   • aspirin tablet 325 mg  325 mg Oral Daily   • atorvastatin (LIPITOR) tablet 80 mg  80 mg Oral Daily With Dinner   • ceFAZolin (ANCEF) IVPB (premix in dextrose) 2,000 mg 50 mL  2,000 mg Intravenous Q8H   • docusate sodium (COLACE) capsule 100 mg  100 mg Oral BID   • fondaparinux (ARIXTRA) subcutaneous injection 2.5 mg  2.5 mg Subcutaneous Daily   • furosemide (LASIX) injection 40 mg  40 mg Intravenous BID (diuretic)   • insulin lispro (HumaLOG) 100 units/mL subcutaneous injection 1-6 Units  1-6 Units Subcutaneous TID AC   • insulin lispro (HumaLOG) 100 units/mL subcutaneous injection 1-6 Units  1-6 Units Subcutaneous HS   • magnesium sulfate 2 g/50 mL IVPB (premix) 2 g  2 g Intravenous Once   • metoprolol tartrate (LOPRESSOR) partial tablet 12.5 mg  12.5 mg Oral Q12H INGRID   • ondansetron (ZOFRAN) injection 4 mg  4 mg Intravenous Q6H PRN   • oxyCODONE (ROXICODONE) IR tablet 5 mg  5 mg Oral Q4H PRN   • oxyCODONE (ROXICODONE) split tablet 2.5 mg  2.5 mg Oral Q4H PRN   • pantoprazole (PROTONIX) EC tablet 40 mg  40 mg Oral Early Morning   • phenylephrine (SUSANA-SYNEPHRINE) 50 mg (STANDARD CONCENTRATION) in sodium chloride 0.9% 250 mL   mcg/min Intravenous Titrated   • potassium chloride (K-DUR,KLOR-CON) CR tablet 20 mEq  20 mEq Oral BID   • temazepam (RESTORIL) capsule 15 mg  15 mg Oral HS PRN     phenylephine,  mcg/min, Last Rate: 30 mcg/min (09/21/23 0730)      No Known Allergies    Objective   Vitals: Blood pressure 102/64, pulse 96, temperature 99.1 °F (37.3 °C), resp.  rate (!) 23, height 5' 9.69" (1.77 m), weight 110 kg (242 lb 4.6 oz), SpO2 94 %. , Body mass index is 35.08 kg/m². ,     Systolic (46LYU), NIX:80 , Min:92 , DVO:580     Diastolic (50DFA), WXM:03, Min:59, Max:64        Intake/Output Summary (Last 24 hours) at 9/21/2023 0827  Last data filed at 9/21/2023 0800  Gross per 24 hour   Intake 8025.72 ml   Output 3970 ml   Net 4055.72 ml     Wt Readings from Last 3 Encounters:   09/21/23 110 kg (242 lb 4.6 oz)   09/08/23 105 kg (232 lb 4.8 oz)   09/06/23 105 kg (231 lb 7.7 oz)     Invasive Devices     Central Venous Catheter Line  Duration           CVC Central Lines 09/20/23 Triple Right Internal jugular <1 day          Peripheral Intravenous Line  Duration           Peripheral IV 09/20/23 Left Antecubital 1 day          Arterial Line  Duration           Arterial Line 09/20/23 Right Radial 1 day          Line  Duration           Pacer Wires <1 day    Pacer Wires <1 day          Drain  Duration           Chest Tube 1 Left Pleural 32 Fr. <1 day    Chest Tube 2 Mediastinal;Posterior 32 Fr. <1 day    Chest Tube 3 Mediastinal;Anterior 32 Fr. <1 day    Urethral Catheter Non-latex;Straight-tip; Temperature probe 16 Fr. <1 day              Physical Exam  Vitals reviewed. Constitutional:       General: He is not in acute distress. Appearance: He is obese. Neck:      Vascular: No hepatojugular reflux or JVD. Cardiovascular:      Rate and Rhythm: Regular rhythm. Tachycardia present. Pulses: Normal pulses. Heart sounds: Normal heart sounds. No murmur heard. No friction rub. No gallop. Pulmonary:      Effort: No respiratory distress. Breath sounds: No rales. Comments: Decreased throughout, poor inspiratory effort secondary to pain. On 2 L nasal cannula  CTs in place  Abdominal:      General: Bowel sounds are normal. There is no distension. Palpations: Abdomen is soft. Tenderness: There is no abdominal tenderness.    Genitourinary:     Comments: Adrian catheter draining clear, yellow urine  Musculoskeletal:         General: No tenderness. Normal range of motion. Cervical back: Neck supple. Right lower leg: No edema. Left lower leg: No edema. Skin:     General: Skin is warm and dry. Capillary Refill: Capillary refill takes less than 2 seconds. Findings: No erythema. Comments: Midsternal incision dressing C/D/I   Neurological:      Mental Status: He is alert and oriented to person, place, and time.    Psychiatric:         Mood and Affect: Mood normal.         LABORATORY RESULTS:      CBC with diff:   Results from last 7 days   Lab Units 09/21/23 0429 09/20/23 2027 09/20/23  1221 09/20/23  1219 09/20/23  1140 09/20/23  1104 09/20/23  1103 09/20/23  1044   WBC Thousand/uL 16.87*  --   --   --   --   --   --   --    HEMOGLOBIN g/dL 13.9 15.4  --  15.2  --   --   --   --    I STAT HEMOGLOBIN g/dl  --   --  12.9  --  12.2 11.2*  --  9.2*   HEMATOCRIT % 39.9 43.4  --  41.2  --   --   --   --    HEMATOCRIT, ISTAT %  --   --  38  --  36* 33*  --  27*   MCV fL 91  --   --   --   --   --   --   --    PLATELETS Thousands/uL 136*  --   --  122*  --   --  128*  --    RBC Million/uL 4.40  --   --   --   --   --   --   --    MCH pg 31.6  --   --   --   --   --   --   --    MCHC g/dL 34.8  --   --   --   --   --   --   --    RDW % 12.8  --   --   --   --   --   --   --    MPV fL 9.7  --   --  9.7  --   --  9.5  --      CMP:  Results from last 7 days   Lab Units 09/21/23 0429 09/20/23 2027 09/20/23  1619 09/20/23  1221 09/20/23  1219 09/20/23  1140 09/20/23  1104 09/20/23  1044 09/20/23  1009 09/20/23 0905   POTASSIUM mmol/L 4.5 4.6 4.3  --  4.5  --   --   --   --   --    CHLORIDE mmol/L 106  --   --   --  109*  --   --   --   --   --    CO2 mmol/L 23  --   --   --  23  --   --   --   --   --    CO2, I-STAT mmol/L  --   --   --  23  --  22 24 25 23 25   BUN mg/dL 16  --   --   --  12  --   --   --   --   --    CREATININE mg/dL 0.85  --   --   -- 0.74  --   --   --   --   --    GLUCOSE, ISTAT mg/dl  --   --   --  142*  --  137 131 129 121 103   CALCIUM mg/dL 7.1*  --   --   --  8.0*  --   --   --   --   --    EGFR ml/min/1.73sq m 87  --   --   --  92  --   --   --   --   --      BMP:  Results from last 7 days   Lab Units 23  0429 23  1619 23  1221 23  1219 23  1140 23  1104 23  1044 23  1009   POTASSIUM mmol/L 4.5 4.6 4.3  --  4.5  --   --   --   --    CHLORIDE mmol/L 106  --   --   --  109*  --   --   --   --    CO2 mmol/L 23  --   --   --  23  --   --   --   --    CO2, I-STAT mmol/L  --   --   --  23  --  22 24 25 23   BUN mg/dL 16  --   --   --  12  --   --   --   --    CREATININE mg/dL 0.85  --   --   --  0.74  --   --   --   --    GLUCOSE, ISTAT mg/dl  --   --   --  142*  --  137 131 129 121   CALCIUM mg/dL 7.1*  --   --   --  8.0*  --   --   --   --        Lab Results   Component Value Date    CREATININE 0.85 2023    CREATININE 0.74 2023    CREATININE 0.84 2023     Results from last 7 days   Lab Units 23  0429   MAGNESIUM mg/dL 2.3       Lipid Profile:   Lab Results   Component Value Date    CHOL 149 10/06/2015     Lab Results   Component Value Date    HDL 43 2023    HDL 43 2022    HDL 45 2021     Lab Results   Component Value Date    LDLCALC 83 2023    LDLCALC 106 (H) 2022    LDLCALC 86 2021     Lab Results   Component Value Date    TRIG 70 2023    TRIG 120 2022    TRIG 118 2021       Cardiac testing:   Results for orders placed during the hospital encounter of 20    Echo complete with contrast if indicated    Narrative  [address] [place] [address]  [phone]    Transthoracic Echocardiogram  2D, M-mode, Doppler, and Color Doppler    Study date:  2020    Patient: Nuzhat Stein  MR number: [id number]  Account number: [de-identified]  : 1950  Age: 77 years  Gender: Male  Status: Outpatient  Location: [facility]  Height: 70 in  Weight: 225.5 lb  BP: 140/ 82 mmHg    Indications: CAD. Diagnoses: I25.10 - Atherosclerotic heart disease of native coronary artery without angina pectoris    Sonographer:  Cesar Collet,, RCS  Primary Physician:  Sy Shannon MD  Referring Physician:  Pallavi Chapman MD  Group:  [facility]  Interpreting Physician:  Tristen Soliman MD    SUMMARY    LEFT VENTRICLE:  Systolic function was normal. LVEF 50-55%  Cannot rule out mild hypokinesis of the mid anteroseptal wall(s). LEFT ATRIUM:  The atrium was mildly dilated. MITRAL VALVE:  There was mild annular calcification. There was trace regurgitation. TRICUSPID VALVE:  There was trace regurgitation. PULMONIC VALVE:  There was trace regurgitation. HISTORY: PRIOR HISTORY: CAD Risk factors: hypertension. PROCEDURE: The study was performed in the [facility]. This was a routine study. The transthoracic approach was used. The study included complete 2D imaging, M-mode, complete spectral Doppler, and color Doppler. The  heart rate was 56 bpm, at the start of the study. Image quality was adequate. LEFT VENTRICLE: Size was normal. Systolic function was normal. LVEF 50-55% Cannot rule out mild hypokinesis of the mid anteroseptal wall(s). RIGHT VENTRICLE: The size was normal. Systolic function was normal.    LEFT ATRIUM: The atrium was mildly dilated. RIGHT ATRIUM: Size was normal.    MITRAL VALVE: There was mild annular calcification. DOPPLER: There was no evidence for stenosis. There was trace regurgitation. AORTIC VALVE: The valve was trileaflet. Leaflets exhibited normal thickness and normal cuspal separation. DOPPLER: Transaortic velocity was within the normal range. There was no evidence for stenosis. There was no regurgitation. TRICUSPID VALVE: There was normal leaflet separation.  DOPPLER: The transtricuspid velocity was within the normal range. There was no evidence for stenosis. There was trace regurgitation. PULMONIC VALVE: Not well visualized. DOPPLER: There was trace regurgitation. PERICARDIUM: There was no pericardial effusion. AORTA: The root exhibited normal size. SYSTEM MEASUREMENT TABLES    2D  %FS: 38.86 %  Ao Diam: 3.2 cm  EDV(Teich): 144.38 ml  EF(Teich): 68.7 %  ESV(Teich): 45.18 ml  IVSd: 1.2 cm  LA Area: 16.38 cm2  LA Diam: 4.24 cm  LVEDV MOD A4C: 92.09 ml  LVEF MOD A4C: 44.57 %  LVESV MOD A4C: 51.05 ml  LVIDd: 5.45 cm  LVIDs: 3.33 cm  LVLd A4C: 8.67 cm  LVLs A4C: 8.35 cm  LVPWd: 1.31 cm  RA Area: 17.42 cm2  RVIDd: 4.02 cm  RWT: 0.48  SV MOD A4C: 41.04 ml  SV(Teich): 99.19 ml    CW  AV Env. Ti: 266.41 ms  AV MaxPG: 3.15 mmHg  AV VTI: 17.11 cm  AV Vmax: 0.89 m/s  AV Vmean: 0.64 m/s  AV meanP.83 mmHg    MM  TAPSE: 2.33 cm    PW  E': 0.07 m/s  E/E': 6.39  LVOT Env. Ti: 293.05 ms  LVOT VTI: 16.82 cm  LVOT Vmax: 0.78 m/s  LVOT Vmean: 0.57 m/s  LVOT maxP.4 mmHg  LVOT meanP.41 mmHg  MV A Frank: 0.49 m/s  MV Dec Stafford: 1.62 m/s2  MV DecT: 278.28 ms  MV E Frank: 0.45 m/s  MV E/A Ratio: 0.91  MV PHT: 80.7 ms  MVA By PHT: 2.73 cm2    [employer] Accredited Echocardiography Laboratory    Prepared and electronically signed by    Corine Scott MD  Signed 2020 13:24:00    No results found for this or any previous visit. No valid procedures specified. Results for orders placed during the hospital encounter of 23    NM myocardial perfusion spect (stress and/or rest)    Interpretation Summary  1. Good exercise tolerance-10.1 METS  2. Resting EKG suggesting anteroseptal scar  3. Negative EKG portion of stress test  4. There is a moderate-sized fixed apical defect of severe intensity indicating previous apical scar  5. There is a moderate intensity fixed septal defect indicating prior septal scar  6.   There is a mild intensity small mid anterior wall reversible  defect most consistent with mid anterior wall ischemia. 7.  Apical hypokinesis and generalized hypokinesis. Calculated ejection fraction 44%  8. No chest discomfort with exercise    Imaging: I have personally reviewed pertinent reports. and I have personally reviewed pertinent films in PACS  XR chest portable ICU    Result Date: 9/20/2023  Narrative: CHEST INDICATION:   S/P open heart. COMPARISON: Compared with 9/8/2023 EXAM PERFORMED/VIEWS:  XR CHEST PORTABLE ICU FINDINGS: Sternotomy wires. Endotracheal tube above the layne. Pericardial catheters. Right neck catheter with tip in the cavoatrial region. Right neck [place]-Dina catheter tip in the main pulmonary artery. Left chest tube tip in the mid lung region. Cardiomediastinal silhouette appears unremarkable. Diffuse prominent vascular markings. The lungs are clear. No pneumothorax or pleural effusion. Osseous structures appear within normal limits for patient age. Impression: Lines and tubes as described. Mild congestive  changes. Workstation performed: Shu Baeza bedside procedure    Result Date: 9/20/2023  Narrative: [id number]    CALOS Anesthesia    Result Date: 9/20/2023  Narrative: Naomy Jalloh MD     9/20/2023 11:40 AM Procedure Performed: CALOS Anesthesia Start Time:  9/20/2023 8:50 AM Preanesthesia Checklist Patient identified, IV assessed, risks and benefits discussed, monitors and equipment assessed, procedure being performed at surgeon's request and anesthesia consent obtained. Procedure Diagnostic Indications for CALOS:  hemodynamic monitoring. Type of CALOS: complete CALOS with interpretation. Images Saved: ultrasound permanent image saved. Physician Requesting Echo: Raenell Fothergill, MD.  Location performed: OR. Intubated. Bite block not placed. Heart visualized. Insertion of CALOS Probe:  Atraumatic. Probe Type:  Epiaortic and multiplane.  Modalities:  3D, color flow mapping, continuous wave Doppler and pulse wave Doppler. Echocardiographic and Doppler Measurements PREPROCEDURE LEFT VENTRICLE: Systolic Function: mildly impaired. Ejection Fraction: 45%. Regional Wall Motion Abnormalities: mid ant-sept, sept, infsept severe HK (myocardial thinned, aneurysmal appearing). RIGHT VENTRICLE: Systolic Function: normal.  Cavity size normal.  AORTIC VALVE: Leaflets: normal and trileaflet. Leaflet motions normal and normal. Stenosis: none. Regurgitation: none. MITRAL VALVE: Leaflets: normal. Leaflet Motions: prolapse. Regurgitation: mild. Stenosis: none. TRICUSPID VALVE: Leaflets: normal.   Regurgitation: trace. ASCENDING AORTA:   Dissection not present. AORTIC ARCH:   dissection not present. Grade 2: severe intimal thickening without protruding atheroma. DESCENDING AORTA:   Dissection not present. Grade 2: severe intimal thickening without protruding atheroma. LEFT ATRIAL APPENDAGE:   Emptying Velocity: 4 cm/sec. OTHER ATRIAL FINDINGS: No JOEL clot. ATRIAL SEPTUM: Intra-atrial septal morphology: no PFO by CFD. EPIAORTIC: Plaque Thickness: 0-5 mm. POSTPROCEDURE LEFT VENTRICLE: Unchanged . RIGHT VENTRICLE: Unchanged . AORTIC VALVE: Unchanged . MITRAL VALVE: Unchanged . TRICUSPID VALVE: Unchanged . AORTA: Unchanged . Dissection: Dissection not present. REMOVAL: Probe Removal: atraumatic. VAS carotid complete study    Result Date: 9/13/2023  Narrative:  [facility] REPORT CLINICAL: Indications: Patient presents for surgical clearance and general health evaluation secondary to future open heart surgery. Patient is asymptomatic at this time. Operative History: 2023-09-06 Cardiac cath Coronary angio with stent Risk Factors The patient has history of CAD. Clinical Right Pressure:  138/80 mm Hg, Left Pressure:  140/80 mm Hg. FINDINGS:  Right        Impression  PSV  EDV (cm/s)  Direction of Flow  Ratio  Dist. ICA                 74          28                      0.96  Mid. ICA                  65          28                      0.84  Prox. ICA    1 - 49%      42          15                      0.54  Dist CCA                  69          20                            Mid CCA                   77          19                      1.08  Prox CCA                  71          13                            Ext Carotid               89          12                      1.16  Prox Vert                 53          15  Antegrade                 Subclavian               166           0                             Left         Impression  PSV  EDV (cm/s)  Direction of Flow  Ratio  Dist. ICA                 70          24                      0.77  Mid. ICA                  91          30                      1.00  Prox. ICA    1 - 49%      57          15                      0.62  Dist CCA                  64          17                            Mid CCA                   91          24                      0.98  Prox CCA                  93          19                            Ext Carotid               89          12                      0.97  Prox Vert                 36          14  Antegrade                 Subclavian               104           0                               CONCLUSION:  Impression RIGHT: There is <50% stenosis noted in the internal carotid artery. Plaque is heterogenous and irregular. Vertebral artery flow is antegrade. There is no significant subclavian artery disease. LEFT: There is <50% stenosis noted in the internal carotid artery. Plaque is heterogenous and irregular. Vertebral artery flow is antegrade. There is no significant subclavian artery disease. No prior study for comparison  SIGNATURE: Electronically Signed by: Krista Porter on 2023-09-13 10:18:56 AM    VAS lower limb vein mapping bypass graft    Result Date: 9/13/2023  Narrative:  [facility] REPORT CLINICAL: Indications: Coronary Artery Disease [I25.10].  Patient presents for surgical clearance and general health evaluation secondary to future open heart surgery. Patient is asymptomatic at this time. Operative History: 2023-09-06 Cardiac cath Coronary angio with stent Risk Factors The patient has history of CAD. FINDINGS:  Right           AP (mm)  GSV Inguinal        7.9  GSV Prox Thigh      4.2  GSV Mid Thigh       4.2  GSV Dist Thigh      4.0  GSV Knee            3.9  GSV Prox Calf       4.2  GSV Mid Calf        3.7  GSV Dist Calf       3.5  GSV Ankle           4.0   Left            AP (mm)  GSV Inguinal        6.6  GSV Prox Thigh      4.5  GSV Mid Thigh       3.6  GSV Dist Thigh      3.3  GSV Knee            3.1  GSV Prox Calf       3.2  GSV Mid Calf        3.3  GSV Dist Calf       3.2  GSV Ankle           3.6     CONCLUSION:  Impression: RIGHT LOWER LIMB: The great saphenous vein is patent  from the groin to the ankle. The intraluminal diameter measurements range from 3.5 mm to 7.9 mm throughout. LEFT LOWER LIMB: The great saphenous vein is patent  from the groin to the ankle. The intraluminal diameter measurements range from 3.1 mm to 6.6 mm throughout. SIGNATURE: Electronically Signed by: Kelsie Gregory on 2023-09-13 10:18:42 AM    XR chest pa & lateral    Result Date: 9/10/2023  Narrative: CHEST INDICATION:   I25.10: Atherosclerotic heart disease of native coronary artery without angina pectoris E78.2: Mixed hyperlipidemia I10: Essential (primary) hypertension Z01.810: Encounter for preprocedural cardiovascular examination I21. A9: Other myocardial infarction type. COMPARISON: CXR 7/22/2021. EXAM PERFORMED/VIEWS:  XR CHEST PA & LATERAL. DUAL ENERGY SUBTRACTION. FINDINGS: Cardiomediastinal silhouette normal. Lungs clear. No effusion or pneumothorax. Upper abdomen normal.  Bones normal for age. Impression: No acute cardiopulmonary disease.  Workstation performed: [id number]     Cardiac catheterization    Result Date: 9/7/2023  Narrative: •  Prox LAD lesion is 55% stenosed. •  Mid LAD lesion is 85% stenosed. •  1st Diag lesion is 70% stenosed. •  2nd Diag lesion is 100% stenosed. •  Prox RCA lesion is 75% stenosed. •  Dist RCA lesion is 75% stenosed. •  RPDA lesion is 80% stenosed. Multivessel coronary artery disease involving LAD, diagonal, and RCA. LAD has proximal moderate ISR with mid LAD stenosis. RCA has diffuse proximal ISR with 75% stenosis. There is distal RCA and proximal RPDA stenosis. Thank you for allowing us to participate in this patient's care. This pt will follow up with Dr. Yecenia Alexander once discharged. Counseling / Coordination of Care  Total floor / unit time spent today 45 minutes. Greater than 50% of total time was spent with the patient and / or family counseling and / or coordination of care. A description of the counseling / coordination of care: Review of history, current assessment, development of a plan. Code Status: Level 1 - Full Code    ** Please Note: Dragon 360 Dictation voice to text software may have been used in the creation of this document.  **

## 2023-09-21 NOTE — OCCUPATIONAL THERAPY NOTE
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, congenital     Nephrotic syndrome     Nonmelanoma skin cancer     last assessed 1/11/18    Old myocardial infarction     Skin cancer     last assessed 1/13/14    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      Past Surgical History  Past Surgical History:   Procedure Laterality Date    ATHERECTOMY      1 with stent placement  last assessed 6/17/14    CARDIAC CATHETERIZATION      outcome: successful  last assessed 6/17/14    CARDIAC CATHETERIZATION Left 9/6/2023    Procedure: Cardiac Left Heart Cath;  Surgeon: Heber Simmons MD;  Location: [facility] [address] CATH LAB; Service: Cardiology    CARDIAC CATHETERIZATION N/A 9/6/2023    Procedure: Cardiac Coronary Angiogram;  Surgeon: Heber Simmons MD;  Location: [address] CATH LAB; Service: Cardiology    CARDIAC CATHETERIZATION  9/6/2023    Procedure: Cardiac catheterization;  Surgeon: Heber Simmons MD;  Location: [address] CATH LAB;   Service: Cardiology    CORONARY ANGIOPLASTY      CORONARY ANGIOPLASTY WITH STENT PLACEMENT      to LAD, diagonal and RCA    MALIGNANT SKIN LESION EXCISION  01/09/2012    Trunk,  SCC chest     CO CORONARY ARTERY BYP W/VEIN & ARTERY GRAFT 3 VEIN N/A 9/20/2023    Procedure: CORONARY ARTERY BYPASS GRAFT (CABG) X2 VESSELS, LIMA - LAD,  LEFT LEG EVH - PDA , W/ CALOS;  Surgeon: Alan Oliva MD;  Location: [facility];  Service: Cardiac Surgery           09/21/23 1056   OT Last Visit   OT Visit Date 09/21/23   Note Type   Note type Evaluation   Pain Assessment   Pain Assessment Tool 0-10   Pain Score 5   Pain Location/Orientation Location: Chest;Location: Incision   Hospital Pain Intervention(s) Repositioned; Ambulation/increased activity   Restrictions/Precautions   Weight Bearing Precautions Per Order No   Other Precautions Cardiac/sternal;Telemetry;O2;Fall Risk;Pain;Multiple lines  (1 L O2, + 2 CT)   Home Living   Type of Home House   Home Layout Two level; Able to live on main level with bedroom/bathroom  (0 FRANK, FF to 2nd fl, recliner on 1st fl he plans to sleep in)   Bathroom Shower/Tub Tub only   Bathroom Toilet Standard   Additional Comments Pt lives in a 2 SH with 0 FRANK, plans to use recliner on 1st fl, uses tub shower and standard height toilet   Prior Function   Level of [place] Independent with ADLs; Independent with functional mobility; Independent with IADLS   Lives With Spouse   Receives Help From Family   IADLs Independent with driving; Independent with meal prep; Independent with medication management   Falls in the last 6 months 0   Vocational Retired   Lifestyle   Autonomy Pta pt I with ADL, IADL and fxnal mobility with no AD, (+)    Reciprocal Relationships supportive spouse and family   Service to Others retired   [address] [facility] enjoys chooping wood and doing work around the yard and [address] [facility] "I won't be doing yardwork anytime soon"   ADL   Where Assessed Chair   Eating Assistance 6  Modified independent   [address] 4  [address] [facility] 4  [address] [facility] 4  Minimal Assistance   Additional Comments MIN A due to pain and multiple lines/tubes   Bed Mobility   Additional Comments recieved OOB seated in chair, left in chair with family present   Transfers   Sit to Stand 4  Minimal assistance   Additional items Assist x 1;Verbal cues   Stand to Sit 4  Minimal assistance   Additional items Assist x 1;Verbal cues   Additional Comments RW   Functional Mobility   Functional Mobility 4  Minimal assistance   Additional Comments Performs short household distance mobility MIN A x 1 with RW   Additional items Rolling walker   Balance   Static Sitting Good   Dynamic Sitting Fair   Static Standing Poor +   Dynamic Standing Poor +   Ambulatory Poor +   Activity Tolerance   Activity Tolerance Patient tolerated treatment well   Medical Staff Made Aware Co eval with PT due to medical complexity   Nurse Made Aware RN cleared for therapy   RUE Assessment   RUE Assessment WFL   LUE Assessment   LUE Assessment WFL   Hand Function   Gross Motor Coordination Functional   Fine Motor Coordination Functional   Sensation   Light Touch No apparent deficits   Vision-Basic Assessment   Current Vision Wears glasses all the time   Psychosocial   Psychosocial (WDL) WDL   Cognition   Overall Cognitive Status WFL   Arousal/Participation Alert; Cooperative   Attention Within functional limits   Orientation Level Oriented X4   Memory Within functional limits   Following Commands Follows all commands and directions without difficulty   Comments Pt cooperative to therapy and very well informed about procedure and limitations/pxns, pt verbalizes understanding to provided education packet   Assessment   Limitation Decreased ADL status; Decreased endurance;Decreased self-care trans;Decreased high-level ADLs   Prognosis Good   Assessment Pt is a 71 y.o. male who was admitted to [address] [place] [address] on 9/20/2023 with Coronary artery disease involving native coronary artery of native heart without angina pectoris, s/p CABG. Pt seen for an OT evaluation per active OT orders, 2 CT in place, cardiac pxns.  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 Pt lives with spouse in a 2 SH with 0 FRANK, FF to 2nd fl although pt plans to sleep on a recliner on 1st fl following initial D/C, uses a tub shower and standard toilet. Pta, pt was independent w/ ADL/IADL and functional mobility, was (+) driving and was not using any DME at baseline. Currently, pt is Min Ax1 for UB ADL, Min Ax1 for LB ADL, and completed transfers/FM w Min Ax1. Pt also completed additional tx session focused on cardiac education packet Recovering from you Cardiac Surgery, verbalizes understanding. Pt currently presents with impairments in the following categories -difficulty performing ADLS activity tolerance and endurance. These impairments, as well as pt's fatigue, pain and cardiac/sternal precautions  limit pt's ability to safely engage in all baseline areas of occupation, includingbathing, dressing, toileting and functional mobility/transfers. The patient's raw score on the AM-PAC Daily Activity Inpatient Short Form is 20. A raw score of greater than or equal to 19 suggests the patient may benefit from discharge to home. Please refer to the recommendation of the Occupational Therapist for safe discharge planning. Pt is mainly limited by pain and multiple lines and has adequate assist at home. Pt would benefit from continued acute OT services throughout hospital course. Plan for OT interventions 2-3x per week. From OT standpoint, recommend home with no further rehab needs upon D/C. Pt was left seated in bedside chair with all needs within reach and family present. Goals   Patient Goals to get better   Plan   Treatment Interventions ADL retraining;Functional transfer training;Cardiac education; Energy conservation; Activityengagement;Continued evaluation;Patient/family training; Endurance training   Goal Expiration Date 10/05/23   OT Frequency 2-3x/wk   Recommendation   OT Discharge Recommendation No rehabilitation needs   AM-PAC Daily Activity Inpatient   Lower Body Dressing 3   Bathing 3   Toileting 3   Upper Body Dressing 3   Grooming 4   Eating 4   Daily Activity Raw Score 20   Daily Activity Standardized Score (Calc for Raw Score >=11) 42.03   AM-PAC Applied Cognition Inpatient   Following a Speech/Presentation 4   Understanding Ordinary Conversation 4   Taking Medications 4   Remembering Where Things Are Placed or Put Away 4   Remembering List of 4-5 Errands 4   Taking Care of Complicated Tasks 4   Applied Cognition Raw Score 24   Applied Cognition Standardized Score 62.21   Additional Treatment Session   Start Time 1138   End Time 1152   Treatment Assessment Pt provided s/p cardiac sx education packet, reviewed w/ OT, discussed cardiac/sternal precautions, lifestyle modifications, post hospitalization IADL management, environmental temperature control, emotional regulation and management, lifting/driving restrictions, energy conservation techniques, mobility schedule, and cardiac rehabilitation initiation upon clearance per physician at follow up. Pt reviewed education packet and acknowledged reception of such. Additional Treatment Day 1   End of Consult   Education Provided Yes   Patient Position at End of Consult Bedside chair; All needs within reach   Nurse Communication Nurse aware of consult       OT Goals    - Pt will be Supervision with LB ADL by end of hospital course. - Pt will be Supervision with UB ADL by end of hospital course. - Pt will be Supervision with all functional transfers required for patient safety by end of hospital course. - Pt will be Supervision with functional mobility to/from bathroom for increased independence with toileting tasks. - Pt activity tolerance will increase to 30 minutes in order to safely engage in ADL and transfers. - Pt standing tolerance will increase to 10 minutes  in order to promote sink side ADLs and IADL activities. - Pt will recall all cardiac precautions during OT sessions to promote safety following hospital stay.       CATHRYN Delatorre, OTR/L

## 2023-09-21 NOTE — DISCHARGE INSTRUCTIONS
Instructions Following Open Heart Surgery      Protect your sternum (breast bone). Wires are placed during surgery to hold the sternum together. Do not lift anything heavier than 10 pounds for the first four weeks after surgery. (For example, a gallon of milk weighs 8 pounds) When you are seen for a routine postop check, you will be cleared to lift up to 25 lbs. Following three months from the time of surgery, you may lift without any restrictions. Hug a pillow to your chest or cross your arms over your chest when you laugh, sneeze, or cough. You may resume sexual activity when you feel ready. Do not put any excessive pressure on your chest.    Be careful when you get into or out of a chair or bed. Hug a pillow or cross your arms when you stand or sit. Do not twist as you move. Use only your legs to sit and stand. You may need a raised toilet seat if you have trouble standing up without using your arms. No sleeping on side for the first 4 weeks. Limit daytime naps, to prevent difficulty sleeping at night. Women: Wear a clean surgical bra every day. Do not play sports that use your shoulder. Examples include tennis and golf. Do not drive until cleared by surgeon. Typically cleared to drive after one month, determination will be made at routine postop appointment. When a passenger in the car, wear a seat belt. Continue you breathing exercises throughout the day with incentive spirometry    Activity: Your goal is to go on frequent walks, slowly increasing your activity level. Walking will help prevent leg swelling and prevent blood clots. When you start outpatient cardiac rehab in one month, you will be given additional instructions and a formal exercise plan. It is OK to go up steps, with help. You may resume sexual activity when you are comfortable. Do not put excessive pressure on your chest.     Weigh yourself daily in the morning and keep of log of your weight.  If your weight increases more than 2 lbs per day or more than 5 lbs in a week, call your surgeon's office. Keep your legs elevated when sitting. Pressure stockings may be worn to prevent significant leg swelling. You may get routine vaccines any time after open heart surgery    Do not smoke:  Nicotine can damage blood vessels and make it more difficult to heal. Do not use e-cigarettes or smokeless tobacco in place of cigarettes or to help you quit. They still contain nicotine. Ask your primary care provider for information if you currently smoke and need help quitting. Incision/Wound Care: Shower daily. Gently wash your incision with warm water and mild soap. Do not scrub the area. Gently pat the area dry with a clean towel. If you have a bandage, dry the area and put on a new, clean bandage. Change your bandage at least daily, or if it gets wet or dirty. Always wash your hands before you care for your wound. Do not apply ointments, creams, lotions, powders, etc. If you develop signs of an infection (fever > 101, redness, warm skin, or drainage) please call your surgeon's office. Do not pick at or touch puncture sites or incisions. Allow and scabs to come off on their own. It is normal to have some drainage from the chest tube sites. Apply clean/dry dressings, until this resolves. You may have discomfort or numbness along the incision for 3-4 weeks. It is normal to occasionally experience brief sharp shooing pains, tightness, or pulling sensations. Do not take a bath or swim until cleared by surgeon. As your incision heals, sometimes small tender lumps or pimple-like bumps develop which is due to your body attempting to [facility] the suture (stiches). Call your local emergency number (911 in the [address]) or have someone call if:   You have squeezing, pressure, or pain in your chest or back, lightheadedness or sudden cold sweat  Short of breath that does not go away with rest  You cough up blood.   You have a fast heartbeat that flutters. Or palpitations  You faint. Signs of a stroke:  Numbness or drooping on one side of your face. Weakness in an arm or leg. Confusion or difficulty speaking. Dizziness, a severe headache, or vision loss    Call your surgeons office if:   You have bleeding that does not stop even after you apply pressure for 5 minutes. You have redness, tenderness, or signs of infection at incision (pain, swelling, odor, or green/yellow discharge from incision site)  You have a severe headache. You have a fever higher than 101°F (38.4°C). You urinate less, or not at all. You have persistent nausea, vomiting, or diarrhea  You hear persistent or worsening crunching or grinding in your sternum. You have questions or concerns about your condition or care. Diet:    Eat heart-healthy foods, low in unhealthy fats and sodium (salt). A heart healthy diet helps improve your cholesterol levels and lowers your risk for heart disease and stroke. You will receive formal education on healthy eating and label reading during your cardiac rehab in one month. Choose foods that contain healthy fats, such as soybean, canola, olive, corn, and sunflower oils. Limit unhealthy fats. Examples include:  Cholesterol  is found in animal foods, such as eggs and lobster, and in dairy products made from whole milk. Saturated fat  is found in meats, such as crenshaw and hamburger. It is also found in chicken or turkey skin, whole milk, and butter. Trans fat  is found in packaged foods, such as potato chips and cookies. It is also in some fried foods, and shortening. Do not eat foods that contain trans fats. Get 20 to 30 grams of fiber each day. Fruits, vegetables, whole-grain foods, and legumes (cooked beans) are good sources of fiber. Low Sodium: Limit to 2,000 mg or less each day, unless otherwise directed. Choose low-sodium or no-salt-added foods. Add little or no salt to food you prepare.  Use herbs and spices in place of salt. Foods high in sodium include frozen dinners, macaroni and cheese, instant potatoes, canned vegetables, cured or smoked meats, hot dogs, crenshaw and sausage, ketchup, barbecue sauce, salad dressing, pickles, olives, soy sauce, and miso. Fluid Restriction: Fluid restriction after hospital discharge is advised. Limit your fluid intake to no more than 8 cups of liquid (8oz = 1cup) or 2000 mL per day. Limit/Do not drink alcohol. Alcohol can damage heart and raise your blood pressure. The general recommended limit is 1 drink a day for women 21 or older and for men 72 or older. Do not have more than 3 drinks within 24 hours or 7 within a week. A drink of alcohol is 12 oz of beer, 5 oz of wine, or 1½ oz of liquor. Narcotic Safety     What do I need to know about narcotic safety? A narcotic is a type of medicine used to treat pain. When you are discharged from the hospital, you will be prescribed a narcotic called oxycodone. Pain control and management may help you rest, heal, and return to your daily activities. Do not take more than the recommended amount. Too much can cause a life-threatening overdose. Do not continue to take it after your pain stops. How do I use narcotics safely? Take prescribed narcotics exactly as directed. You may develop tolerance. This means you keep needing higher doses to get the same effect. You may also develop narcotic use disorder. This means you are not able to control your narcotic use. Do not give narcotics to others or take narcotics that belong to someone else. Do not mix narcotics with other medicines or alcohol. The combination can cause an overdose, or cause you to stop breathing. Do not drive or operate heavy machinery after you use a narcotic. Talk to your healthcare provider if you have any side effects. Side effects include nausea, sleepiness, itching, and trouble thinking clearly. What can I do to manage constipation? Constipation is the most common side effect of narcotic medicine. Constipation is when you have hard, dry bowel movements, or you go longer than usual between bowel movements. The following are ways you can prevent or relieve constipation:  Drink liquids as allowed. Drinking extra liquids will help soften and move your bowels. You should drink up to your maximum fluid allotment of 2 liters. Eat high-fiber foods. This may help decrease constipation by adding bulk to your bowel movements. High-fiber foods include fruits, vegetables, whole-grain breads and cereals, and beans  Supplements. You have been prescribed a fiber supplement called polyethylene glycol (Maria Esther lax) and a stool softener called docusate sodium (Colace). You should take these for as long as you continue narcotic medications. Exercise regularly. Regular physical activity can help stimulate your intestines. Walking is a good exercise to prevent or relieve constipation. Ask which exercises are best for you. How do I store narcotics safely? Store narcotics where others cannot easily get them. Keep them in a locked cabinet or secure area. Do not  keep them in a purse or other bag you carry with you. A person may be looking for something else and find the narcotics. Make sure narcotics are stored out of the reach of children. A child can easily overdose on narcotics. Narcotics may look like candy to a small child. Blood Thinners    What you need to know:     You have been prescribed a blood thinner(s) to help prevent blood clots. Blood clots can cause strokes, heart attacks, and death. Examples of blood thinners include Aspirin, Plavix (clopidorel), Brilinta (ticagrelor), Xarelto (rivaroxaban), Pradaxa., (dabigatran), Eliquis (apixaban), and Coumadin (warfarin)    While taking any blood thinner, watch for bleeding and bruising. Watch for blood in your urine and bowel movements.  Use a soft washcloth on your skin, and a soft toothbrush to brush your teeth. If you shave, use an electric shaver. Do not play contact sports. Do not start or stop any other medicines unless your healthcare provider tells you to do so. (Many medicines cannot be used with blood thinners)    Take your blood thinner exactly as prescribed by your healthcare provider. Do not skip a dose or take less than prescribed. Tell your provider right away if you forget to take your blood thinner, or if you took too much. Warfarin (Coumadin): You will need a regular blood test called a PT/INR. This test checks how thin your blood is. Your doctor may change your Coumadin dose, based on blood test (INR) results. Following discharge from the hospital, you will be contacted by the [address] to order these blood tests. If you do not hear from them within two days, contact the office at [phone]  It is not safe to take this medicine during pregnancy. It could harm an unborn baby. Tell your doctor right away if you become pregnant. Use an effective form of birth control to keep from getting pregnant during treatment and for at least 1 month after your last dose. If you miss a dose do not take extra medicine to make up for a missed dose. Inform your healthcare provider. Foods and medicines can affect your body's response to Coumadin. Warfarin works best when you eat about the same amount of vitamin K every day. Vitamin K is found in green leafy vegetables and certain other foods. Do not make major changes to your diet while you take Coumadin. Do not eat grapefruit or drink grapefruit juice while you are using this medicine.

## 2023-09-22 LAB
ANION GAP SERPL CALCULATED.3IONS-SCNC: 8 MMOL/L
BUN SERPL-MCNC: 18 MG/DL (ref 5–25)
CALCIUM SERPL-MCNC: 7.9 MG/DL (ref 8.4–10.2)
CHLORIDE SERPL-SCNC: 100 MMOL/L (ref 96–108)
CO2 SERPL-SCNC: 23 MMOL/L (ref 21–32)
CREAT SERPL-MCNC: 0.91 MG/DL (ref 0.6–1.3)
ERYTHROCYTE [DISTWIDTH] IN BLOOD BY AUTOMATED COUNT: 12.9 % (ref 11.6–15.1)
GFR SERPL CREATININE-BSD FRML MDRD: 84 ML/MIN/1.73SQ M
GLUCOSE SERPL-MCNC: 118 MG/DL (ref 65–140)
GLUCOSE SERPL-MCNC: 135 MG/DL (ref 65–140)
GLUCOSE SERPL-MCNC: 143 MG/DL (ref 65–140)
GLUCOSE SERPL-MCNC: 144 MG/DL (ref 65–140)
GLUCOSE SERPL-MCNC: 147 MG/DL (ref 65–140)
HCT VFR BLD AUTO: 39.5 % (ref 36.5–49.3)
HGB BLD-MCNC: 13.5 G/DL (ref 12–17)
INR PPP: 1.22 (ref 0.84–1.19)
MAGNESIUM SERPL-MCNC: 2 MG/DL (ref 1.9–2.7)
MCH RBC QN AUTO: 31.6 PG (ref 26.8–34.3)
MCHC RBC AUTO-ENTMCNC: 34.2 G/DL (ref 31.4–37.4)
MCV RBC AUTO: 93 FL (ref 82–98)
PLATELET # BLD AUTO: 113 THOUSANDS/UL (ref 149–390)
PMV BLD AUTO: 10.1 FL (ref 8.9–12.7)
POTASSIUM SERPL-SCNC: 4.7 MMOL/L (ref 3.5–5.3)
PROTHROMBIN TIME: 15.6 SECONDS (ref 11.6–14.5)
RBC # BLD AUTO: 4.27 MILLION/UL (ref 3.88–5.62)
SODIUM SERPL-SCNC: 131 MMOL/L (ref 135–147)
WBC # BLD AUTO: 16.41 THOUSAND/UL (ref 4.31–10.16)

## 2023-09-22 PROCEDURE — 97116 GAIT TRAINING THERAPY: CPT

## 2023-09-22 PROCEDURE — 85610 PROTHROMBIN TIME: CPT | Performed by: PHYSICIAN ASSISTANT

## 2023-09-22 PROCEDURE — 85027 COMPLETE CBC AUTOMATED: CPT | Performed by: PHYSICIAN ASSISTANT

## 2023-09-22 PROCEDURE — 83735 ASSAY OF MAGNESIUM: CPT | Performed by: PHYSICIAN ASSISTANT

## 2023-09-22 PROCEDURE — 82948 REAGENT STRIP/BLOOD GLUCOSE: CPT

## 2023-09-22 PROCEDURE — 99232 SBSQ HOSP IP/OBS MODERATE 35: CPT | Performed by: INTERNAL MEDICINE

## 2023-09-22 PROCEDURE — 97530 THERAPEUTIC ACTIVITIES: CPT

## 2023-09-22 PROCEDURE — 80048 BASIC METABOLIC PNL TOTAL CA: CPT | Performed by: PHYSICIAN ASSISTANT

## 2023-09-22 PROCEDURE — 99024 POSTOP FOLLOW-UP VISIT: CPT | Performed by: PHYSICIAN ASSISTANT

## 2023-09-22 RX ORDER — FUROSEMIDE 10 MG/ML
60 INJECTION INTRAMUSCULAR; INTRAVENOUS
Status: DISCONTINUED | OUTPATIENT
Start: 2023-09-22 | End: 2023-09-24

## 2023-09-22 RX ORDER — TAMSULOSIN HYDROCHLORIDE 0.4 MG/1
0.8 CAPSULE ORAL
Status: DISCONTINUED | OUTPATIENT
Start: 2023-09-22 | End: 2023-09-22

## 2023-09-22 RX ORDER — SENNOSIDES 8.6 MG
1 TABLET ORAL
Status: DISCONTINUED | OUTPATIENT
Start: 2023-09-22 | End: 2023-09-24 | Stop reason: HOSPADM

## 2023-09-22 RX ORDER — WARFARIN SODIUM 5 MG/1
5 TABLET ORAL
Status: COMPLETED | OUTPATIENT
Start: 2023-09-22 | End: 2023-09-22

## 2023-09-22 RX ORDER — POLYETHYLENE GLYCOL 3350 17 G/17G
17 POWDER, FOR SOLUTION ORAL DAILY
Status: DISCONTINUED | OUTPATIENT
Start: 2023-09-22 | End: 2023-09-24 | Stop reason: HOSPADM

## 2023-09-22 RX ORDER — FUROSEMIDE 10 MG/ML
60 INJECTION INTRAMUSCULAR; INTRAVENOUS
Status: DISCONTINUED | OUTPATIENT
Start: 2023-09-22 | End: 2023-09-22

## 2023-09-22 RX ORDER — TAMSULOSIN HYDROCHLORIDE 0.4 MG/1
0.8 CAPSULE ORAL
Status: DISCONTINUED | OUTPATIENT
Start: 2023-09-22 | End: 2023-09-24 | Stop reason: HOSPADM

## 2023-09-22 RX ORDER — SENNOSIDES 8.6 MG
1 TABLET ORAL ONCE
Status: COMPLETED | OUTPATIENT
Start: 2023-09-22 | End: 2023-09-22

## 2023-09-22 RX ADMIN — ACETAMINOPHEN 975 MG: 325 TABLET, FILM COATED ORAL at 08:37

## 2023-09-22 RX ADMIN — TEMAZEPAM 15 MG: 15 CAPSULE ORAL at 22:37

## 2023-09-22 RX ADMIN — FONDAPARINUX SODIUM 2.5 MG: 2.5 INJECTION, SOLUTION SUBCUTANEOUS at 08:36

## 2023-09-22 RX ADMIN — AMIODARONE HYDROCHLORIDE 150 MG: 50 INJECTION, SOLUTION INTRAVENOUS at 09:46

## 2023-09-22 RX ADMIN — AMIODARONE HYDROCHLORIDE 200 MG: 200 TABLET ORAL at 06:09

## 2023-09-22 RX ADMIN — DOCUSATE SODIUM 100 MG: 100 CAPSULE, LIQUID FILLED ORAL at 08:37

## 2023-09-22 RX ADMIN — POTASSIUM CHLORIDE 20 MEQ: 1500 TABLET, EXTENDED RELEASE ORAL at 17:20

## 2023-09-22 RX ADMIN — POLYETHYLENE GLYCOL 3350 17 G: 17 POWDER, FOR SOLUTION ORAL at 09:45

## 2023-09-22 RX ADMIN — AMIODARONE HYDROCHLORIDE 200 MG: 200 TABLET ORAL at 21:03

## 2023-09-22 RX ADMIN — ATORVASTATIN CALCIUM 80 MG: 80 TABLET, FILM COATED ORAL at 17:20

## 2023-09-22 RX ADMIN — AMIODARONE HYDROCHLORIDE 200 MG: 200 TABLET ORAL at 15:19

## 2023-09-22 RX ADMIN — METOPROLOL TARTRATE 25 MG: 25 TABLET, FILM COATED ORAL at 08:36

## 2023-09-22 RX ADMIN — SENNOSIDES 8.6 MG: 8.6 TABLET, FILM COATED ORAL at 21:06

## 2023-09-22 RX ADMIN — FUROSEMIDE 60 MG: 10 INJECTION, SOLUTION INTRAMUSCULAR; INTRAVENOUS at 15:19

## 2023-09-22 RX ADMIN — POTASSIUM CHLORIDE 20 MEQ: 1500 TABLET, EXTENDED RELEASE ORAL at 08:36

## 2023-09-22 RX ADMIN — TAMSULOSIN HYDROCHLORIDE 0.8 MG: 0.4 CAPSULE ORAL at 17:20

## 2023-09-22 RX ADMIN — OXYCODONE HYDROCHLORIDE 5 MG: 5 TABLET ORAL at 06:09

## 2023-09-22 RX ADMIN — ACETAMINOPHEN 975 MG: 325 TABLET, FILM COATED ORAL at 15:19

## 2023-09-22 RX ADMIN — FUROSEMIDE 60 MG: 10 INJECTION, SOLUTION INTRAMUSCULAR; INTRAVENOUS at 09:42

## 2023-09-22 RX ADMIN — AMIODARONE HYDROCHLORIDE 0.5 MG/MIN: 50 INJECTION, SOLUTION INTRAVENOUS at 21:59

## 2023-09-22 RX ADMIN — ACETAMINOPHEN 975 MG: 325 TABLET, FILM COATED ORAL at 22:37

## 2023-09-22 RX ADMIN — PANTOPRAZOLE SODIUM 40 MG: 40 TABLET, DELAYED RELEASE ORAL at 06:09

## 2023-09-22 RX ADMIN — OXYCODONE HYDROCHLORIDE 5 MG: 5 TABLET ORAL at 01:26

## 2023-09-22 RX ADMIN — WARFARIN SODIUM 5 MG: 5 TABLET ORAL at 17:20

## 2023-09-22 RX ADMIN — ASPIRIN 325 MG ORAL TABLET 325 MG: 325 PILL ORAL at 08:36

## 2023-09-22 RX ADMIN — SENNOSIDES 8.6 MG: 8.6 TABLET, FILM COATED ORAL at 08:37

## 2023-09-22 RX ADMIN — DOCUSATE SODIUM 100 MG: 100 CAPSULE, LIQUID FILLED ORAL at 17:20

## 2023-09-22 NOTE — PROCEDURES
09/22/23    Procedure: Chest tube removal    Chest tubes removed in routine fashion without incident. Insertion site dressed with Acticoat. Tri Valladares tolerated the procedure well. Nurse notified. Procedure: Epicardial Pacing Wire removal    Tri Valladares was returned to bed and informed of mandatory one hour post-procedure bed rest.  The assigned nurse was notified. Epicardial pacing wires removed in routine fashion, without incident. The patient tolerated the procedure well. Vital signs ordered  q 15 minutes for one hour, as per protocol.     SIGNATURE: Freida Montoya PA-C  DATE: September 22, 2023  TIME: 8:36 AM

## 2023-09-22 NOTE — PROGRESS NOTES
[facility]    Cardiology Progress Note  Tri Valladares 71 y.o. male   YOB: 1950 MRN: [id number]  Unit/Bed#: [facility] 406-01 Encounter: [id number]      Subjective:   No significant events overnight. He remains in atrial fibrillation overnight and was started on IV amiodarone infusion and Coumadin. No current complaints of chest pain. His chest tubes were removed earlier today and he is feeling much better. Assessments  21-year-old gentleman had been having mild shortness of breath with exertion and had undergone nuclear stress test earlier this year. This had revealed apical infarct and mildly reduced EF. His echocardiogram subsequently confirmed similar findings of reduced EF and as result was recommended cardiac catheterization. This revealed multivessel coronary artery disease and he was referred for CABG.    He underwent two-vessel CABG yesterday and is doing well postoperatively. He was transferred out of the ICU and we are consulted for further assistance with cardiac management. He reports feeling well overall and denies any major pain other than mild incision site discomfort.     Principal Problem:    Coronary artery disease involving native coronary artery of native heart without angina pectoris  Active Problems:    Hypertension, essential    Combined hyperlipidemia    Impaired fasting glucose    ERIC (obstructive sleep apnea)    S/P CABG x 2    Assessment  1. S/p CABG x2  ? LIMA-LAD, SVG-RPDA  ? For Multivessel coronary artery disease  2. Ischemic cardiomyopathy  3. CAD status post remote PCI  4. Postoperative atrial fibrillation   5. Hypertension  6.  Hyperlipidemia     Plan  • Went into Afib yesterday evening, and remains in Afib despite IV amiodarone drip overnight  • Remains rate controlled in Afib  • Continue IV amiodarone infusion + PO amiodarone loading  • Being started on coumadin today  • Continue aspirin, metoprolol 12.5 mg twice daily, atorvastatin  • Reasonable diuresis with IV Lasix 40 mg twice daily, urine output 1.5 L yesterday  • IV Lasix increased to 60 mg twice daily today, agree with same  • Chest tubes removed earlier today --> encouraged incentive spirometry      Review of Systems   All other systems reviewed and are negative. Telemetry Review: Afib, HR . No other significant events    Objective:   Vitals: Blood pressure 118/74, pulse 94, temperature 98.5 °F (36.9 °C), temperature source Oral, resp. rate 18, height 5' 9.69" (1.77 m), weight 102 kg (224 lb 6.9 oz), SpO2 96 %. , Body mass index is 32.49 kg/m².,   Orthostatic Blood Pressures    Flowsheet Row Most Recent Value   Blood Pressure 118/74 filed at 09/22/2023 0945   Patient Position - Orthostatic VS Lying filed at 09/22/2023 9295         Systolic (25QRJ), LDL:951 , Min:96 , VDJ:224     Diastolic (90SZD), BZT:17, Min:61, Max:78    Wt Readings from Last 5 Encounters:   09/22/23 102 kg (224 lb 6.9 oz)   09/08/23 105 kg (232 lb 4.8 oz)   09/06/23 105 kg (231 lb 7.7 oz)   08/17/23 106 kg (233 lb)   07/31/23 105 kg (231 lb)     I/O       09/20 0701  09/21 0700 09/21 0701  09/22 0700 09/22 0701  09/23 0700    P. O. 240 180 300    I.V. (mL/kg) 5243.3 (47.7) 241.2 (2.4)     IV Piggyback 1700 200     Cell Saver 750      Total Intake(mL/kg) 7933.3 (72.1) 621.2 (6.1) 300 (2.9)    Urine (mL/kg/hr) 2760 (1) 1560 (0.6) 150 (0.3)    Blood 500      Chest Tube 615 430     Total Output 3875 1990 150    Net +4058.3 -1368.8 +150                     Physical Exam  Vitals and nursing note reviewed. Constitutional:       General: He is not in acute distress. Appearance: Normal appearance. He is well-developed. He is obese. He is not ill-appearing or diaphoretic. HENT:      Head: Normocephalic and atraumatic. Nose: No congestion. Eyes:      General: No scleral icterus. Conjunctiva/sclera: Conjunctivae normal.   Neck:      Vascular: No carotid bruit or JVD.    Cardiovascular: Rate and Rhythm: Normal rate. Rhythm irregular. Heart sounds: Normal heart sounds. No murmur heard. No friction rub. No gallop. Pulmonary:      Effort: Pulmonary effort is normal. No respiratory distress. Breath sounds: Normal breath sounds. No wheezing or rales. Chest:      Chest wall: No tenderness. Abdominal:      General: There is no distension. Palpations: Abdomen is soft. Tenderness: There is no abdominal tenderness. Musculoskeletal:         General: No swelling, tenderness or deformity. Cervical back: Neck supple. No muscular tenderness. Right lower leg: No edema. Left lower leg: No edema. Skin:     General: Skin is warm. Neurological:      General: No focal deficit present. Mental Status: He is alert and oriented to person, place, and time. Mental status is at baseline. Psychiatric:         Mood and Affect: Mood normal.         Behavior: Behavior normal.         Thought Content:  Thought content normal.         Laboratory Results: personally reviewed        CBC with diff:   Results from last 7 days   Lab Units 09/22/23  0613 09/21/23  0429 09/20/23  2027 09/20/23  1221 09/20/23  1219 09/20/23  1140 09/20/23  1104 09/20/23  1103   WBC Thousand/uL 16.41* 16.87*  --   --   --   --   --   --    HEMOGLOBIN g/dL 13.5 13.9 15.4  --  15.2  --   --   --    I STAT HEMOGLOBIN g/dl  --   --   --  12.9  --  12.2 11.2*  --    HEMATOCRIT % 39.5 39.9 43.4  --  41.2  --   --   --    HEMATOCRIT, ISTAT %  --   --   --  38  --  36* 33*  --    MCV fL 93 91  --   --   --   --   --   --    PLATELETS Thousands/uL 113* 136*  --   --  122*  --   --  128*   RBC Million/uL 4.27 4.40  --   --   --   --   --   --    MCH pg 31.6 31.6  --   --   --   --   --   --    MCHC g/dL 34.2 34.8  --   --   --   --   --   --    RDW % 12.9 12.8  --   --   --   --   --   --    MPV fL 10.1 9.7  --   --  9.7  --   --  9.5         CMP:  Results from last 7 days   Lab Units 09/22/23  0534 09/21/23  0429 09/20/23 2027 09/20/23  1619 09/20/23  1221 09/20/23  1219 09/20/23  1140 09/20/23  1104 09/20/23  1044 09/20/23  1009 09/20/23  0905   POTASSIUM mmol/L 4.7 4.5 4.6 4.3  --  4.5  --   --   --   --   --    CHLORIDE mmol/L 100 106  --   --   --  109*  --   --   --   --   --    CO2 mmol/L 23 23  --   --   --  23  --   --   --   --   --    CO2, I-STAT mmol/L  --   --   --   --  23  --  22 24 25 23 25   BUN mg/dL 18 16  --   --   --  12  --   --   --   --   --    CREATININE mg/dL 0.91 0.85  --   --   --  0.74  --   --   --   --   --    GLUCOSE, ISTAT mg/dl  --   --   --   --  142*  --  137 131 129 121 103   CALCIUM mg/dL 7.9* 7.1*  --   --   --  8.0*  --   --   --   --   --    EGFR ml/min/1.73sq m 84 87  --   --   --  92  --   --   --   --   --          BMP:  Results from last 7 days   Lab Units 09/22/23  0510 09/21/23 0429 09/20/23 2027 09/20/23 1619 09/20/23  1221 09/20/23  1219 09/20/23  1140 09/20/23  1104 09/20/23  1044   POTASSIUM mmol/L 4.7 4.5 4.6 4.3  --  4.5  --   --   --    CHLORIDE mmol/L 100 106  --   --   --  109*  --   --   --    CO2 mmol/L 23 23  --   --   --  23  --   --   --    CO2, I-STAT mmol/L  --   --   --   --  23  --  22 24 25   BUN mg/dL 18 16  --   --   --  12  --   --   --    CREATININE mg/dL 0.91 0.85  --   --   --  0.74  --   --   --    GLUCOSE, ISTAT mg/dl  --   --   --   --  142*  --  137 131 129   CALCIUM mg/dL 7.9* 7.1*  --   --   --  8.0*  --   --   --        BNP: No results for input(s): "BNP" in the last 72 hours.     Magnesium:   Results from last 7 days   Lab Units 09/22/23  1008 09/21/23  0429   MAGNESIUM mg/dL 2.0 2.3       Coags:       TSH:        Hemoglobin A1C       Lipid Profile:       Meds/Allergies   all current active meds have been reviewed and current meds:   Current Facility-Administered Medications   Medication Dose Route Frequency   • acetaminophen (TYLENOL) tablet 975 mg  975 mg Oral Q8H   • amiodarone (CORDARONE) 900 mg in dextrose 5 % 500 mL infusion  0.5 mg/min Intravenous Continuous   • amiodarone tablet 200 mg  200 mg Oral Q8H [address]   • aspirin tablet 325 mg  325 mg Oral Daily   • atorvastatin (LIPITOR) tablet 80 mg  80 mg Oral Daily With Dinner   • docusate sodium (COLACE) capsule 100 mg  100 mg Oral BID   • fondaparinux (ARIXTRA) subcutaneous injection 2.5 mg  2.5 mg Subcutaneous Daily   • furosemide (LASIX) injection 60 mg  60 mg Intravenous BID (diuretic)   • insulin lispro (HumaLOG) 100 units/mL subcutaneous injection 1-6 Units  1-6 Units Subcutaneous TID AC   • insulin lispro (HumaLOG) 100 units/mL subcutaneous injection 1-6 Units  1-6 Units Subcutaneous HS   • metoprolol tartrate (LOPRESSOR) tablet 25 mg  25 mg Oral Q12H [address]   • ondansetron (ZOFRAN) injection 4 mg  4 mg Intravenous Q6H PRN   • oxyCODONE (ROXICODONE) IR tablet 5 mg  5 mg Oral Q4H PRN   • oxyCODONE (ROXICODONE) split tablet 2.5 mg  2.5 mg Oral Q4H PRN   • pantoprazole (PROTONIX) EC tablet 40 mg  40 mg Oral Early Morning   • polyethylene glycol (MIRALAX) packet 17 g  17 g Oral Daily   • potassium chloride (K-DUR,KLOR-CON) CR tablet 20 mEq  20 mEq Oral BID   • senna (SENOKOT) tablet 8.6 mg  1 tablet Oral HS   • tamsulosin (FLOMAX) capsule 0.8 mg  0.8 mg Oral Daily With Dinner   • temazepam (RESTORIL) capsule 15 mg  15 mg Oral HS PRN   • warfarin (COUMADIN) tablet 5 mg  5 mg Oral Once (warfarin)     Medications Prior to Admission   Medication   • aspirin 81 MG tablet   • atorvastatin (LIPITOR) 20 mg tablet   • metoprolol succinate (TOPROL-XL) 50 mg 24 hr tablet   • isosorbide mononitrate (IMDUR) 30 mg 24 hr tablet   • lisinopril (ZESTRIL) 20 mg tablet   • mupirocin (BACTROBAN) 2 % ointment   • nitroglycerin (NITROSTAT) 0.4 mg SL tablet     amiodarone (CORDARONE) 900 mg in dextrose 5 % 500 mL infusion, 0.5 mg/min, Last Rate: 0.5 mg/min (09/21/23 9052)          Cardiac testing: reviewed  Results for orders placed during the hospital encounter of 07/23/20    Echo complete with contrast if indicated    Narrative  [address] [place] [address], [address] [facility]  [phone]    Transthoracic Echocardiogram  2D, M-mode, Doppler, and Color Doppler    Study date:  2020    Patient: Ramiro Guerrero  MR number: [id number]  Account number: [de-identified]  : 1950  Age: 77 years  Gender: Male  Status: Outpatient  Location: [facility]  Height: 70 in  Weight: 225.5 lb  BP: 140/ 82 mmHg    Indications: CAD. Diagnoses: I25.10 - Atherosclerotic heart disease of native coronary artery without angina pectoris    Sonographer:  Richard Benjamin, IDA  Primary Physician:  Siria Glass MD  Referring Physician:  Ariana Avendano MD  Group:  [facility]  Interpreting Physician:  Corine Scott MD    SUMMARY    LEFT VENTRICLE:  Systolic function was normal. LVEF 50-55%  Cannot rule out mild hypokinesis of the mid anteroseptal wall(s). LEFT ATRIUM:  The atrium was mildly dilated. MITRAL VALVE:  There was mild annular calcification. There was trace regurgitation. TRICUSPID VALVE:  There was trace regurgitation. PULMONIC VALVE:  There was trace regurgitation. HISTORY: PRIOR HISTORY: CAD Risk factors: hypertension. PROCEDURE: The study was performed in the [facility]. This was a routine study. The transthoracic approach was used. The study included complete 2D imaging, M-mode, complete spectral Doppler, and color Doppler. The  heart rate was 56 bpm, at the start of the study. Image quality was adequate. LEFT VENTRICLE: Size was normal. Systolic function was normal. LVEF 50-55% Cannot rule out mild hypokinesis of the mid anteroseptal wall(s). RIGHT VENTRICLE: The size was normal. Systolic function was normal.    LEFT ATRIUM: The atrium was mildly dilated. RIGHT ATRIUM: Size was normal.    MITRAL VALVE: There was mild annular calcification. DOPPLER: There was no evidence for stenosis.  There was trace regurgitation. AORTIC VALVE: The valve was trileaflet. Leaflets exhibited normal thickness and normal cuspal separation. DOPPLER: Transaortic velocity was within the normal range. There was no evidence for stenosis. There was no regurgitation. TRICUSPID VALVE: There was normal leaflet separation. DOPPLER: The transtricuspid velocity was within the normal range. There was no evidence for stenosis. There was trace regurgitation. PULMONIC VALVE: Not well visualized. DOPPLER: There was trace regurgitation. PERICARDIUM: There was no pericardial effusion. AORTA: The root exhibited normal size. SYSTEM MEASUREMENT TABLES    2D  %FS: 38.86 %  Ao Diam: 3.2 cm  EDV(Teich): 144.38 ml  EF(Teich): 68.7 %  ESV(Teich): 45.18 ml  IVSd: 1.2 cm  LA Area: 16.38 cm2  LA Diam: 4.24 cm  LVEDV MOD A4C: 92.09 ml  LVEF MOD A4C: 44.57 %  LVESV MOD A4C: 51.05 ml  LVIDd: 5.45 cm  LVIDs: 3.33 cm  LVLd A4C: 8.67 cm  LVLs A4C: 8.35 cm  LVPWd: 1.31 cm  RA Area: 17.42 cm2  RVIDd: 4.02 cm  RWT: 0.48  SV MOD A4C: 41.04 ml  SV(Teich): 99.19 ml    CW  AV Env. Ti: 266.41 ms  AV MaxPG: 3.15 mmHg  AV VTI: 17.11 cm  AV Vmax: 0.89 m/s  AV Vmean: 0.64 m/s  AV meanP.83 mmHg    MM  TAPSE: 2.33 cm    PW  E': 0.07 m/s  E/E': 6.39  LVOT Env. Ti: 293.05 ms  LVOT VTI: 16.82 cm  LVOT Vmax: 0.78 m/s  LVOT Vmean: 0.57 m/s  LVOT maxP.4 mmHg  LVOT meanP.41 mmHg  MV A Frank: 0.49 m/s  MV Dec Osborne: 1.62 m/s2  MV DecT: 278.28 ms  MV E Frank: 0.45 m/s  MV E/A Ratio: 0.91  MV PHT: 80.7 ms  MVA By PHT: 2.73 cm2    [employer] Accredited Echocardiography Laboratory    Prepared and electronically signed by    Geovanny Benitez MD  Signed 2020 13:24:00    No results found for this or any previous visit. No results found for this or any previous visit. Results for orders placed during the hospital encounter of 23    NM myocardial perfusion spect (stress and/or rest)    Interpretation Summary  1.   Good exercise tolerance-10.1 METS  2. Resting EKG suggesting anteroseptal scar  3. Negative EKG portion of stress test  4. There is a moderate-sized fixed apical defect of severe intensity indicating previous apical scar  5. There is a moderate intensity fixed septal defect indicating prior septal scar  6. There is a mild intensity small mid anterior wall reversible  defect most consistent with mid anterior wall ischemia. 7.  Apical hypokinesis and generalized hypokinesis. Calculated ejection fraction 44%  8.   No chest discomfort with exercise

## 2023-09-22 NOTE — PLAN OF CARE
Problem: PHYSICAL THERAPY ADULT  Goal: Performs mobility at highest level of function for planned discharge setting. See evaluation for individualized goals. Description: Treatment/Interventions: Functional transfer training, LE strengthening/ROM, Elevations, Therapeutic exercise, Endurance training, Equipment eval/education, Bed mobility, Gait training, Spoke to nursing, Spoke to case management, OT, Family  Equipment Recommended: Bill Montoya (at this time)       See flowsheet documentation for full assessment, interventions and recommendations. Outcome: Progressing  Note: Prognosis: Good  Problem List: Decreased strength, Decreased endurance, Impaired balance, Decreased mobility, Decreased coordination, Pain  Assessment: Pt seen for session for setup, bed mob, time EOB, transfers/standign trials, gait w/ rest time, repositioning. Pt cooperative w/ session, states he wants to be able to urinate more. some increased unsteadiness today in static stance and w/ gait. Needs cues for safety, and to correct LOB. continue to anticipate d/c home w/ family. consider home PT if slow to progress        PT Discharge Recommendation:  (no needs vs HHPT)    See flowsheet documentation for full assessment.

## 2023-09-22 NOTE — PHYSICAL THERAPY NOTE
Physical Therapy Treatment Note       09/22/23 1055   PT Last Visit   PT Visit Date 09/22/23   Note Type   Note Type Treatment   Pain Assessment   Pain Assessment Tool 0-10   Pain Score 4   Pain Location/Orientation Location: Chest   Patient's Stated Pain Goal No pain   Hospital Pain Intervention(s) Ambulation/increased activity   Restrictions/Precautions   Weight Bearing Precautions Per Order No   Other Precautions Cardiac/sternal;Multiple lines;Telemetry;O2;Fall Risk;Pain   General   Chart Reviewed Yes   Family/Caregiver Present No   Cognition   Overall Cognitive Status WFL   Arousal/Participation Responsive   Attention Attends with cues to redirect   Orientation Level Oriented to person;Oriented to place;Oriented to situation   Memory Unable to assess   Following Commands Follows one step commands without difficulty   Subjective   Subjective states he feels OK. concerns about limited ability to urinate   Bed Mobility   Supine to Sit 3  Moderate assistance   Additional items Assist x 1; Increased time required   Additional Comments sat EOB x several minuttes prior to initial transfer   Transfers   Sit to Stand 4  Minimal assistance   Additional items Assist x 1; Increased time required   Stand to Sit 4  Minimal assistance   Additional items Assist x 1; Increased time required   Additional Comments length standing time prior to and post gait to urinate.   some LOB in static stance   Ambulation/Elevation   Gait pattern   (slow, short step length, some lateral LOB)   Gait Assistance 4  Minimal assist   Additional items Assist x 1   Assistive Device Rolling walker   Distance 110'x2, standing rests throughout   Balance   Static Sitting Good   Dynamic Sitting Fair   Static Standing Fair -   Dynamic Standing Poor +   Ambulatory Poor +   Endurance Deficit   Endurance Deficit Yes   Endurance Deficit Description fatigue, weakness, pain   Activity Tolerance   Activity Tolerance Patient limited by fatigue;Patient limited by pain;Treatment limited secondary to medical complications (Comment)   Nurse Made Aware yes   Assessment   Prognosis Good   Problem List Decreased strength;Decreased endurance; Impaired balance;Decreased mobility; Decreased coordination;Pain   Assessment Pt seen for session for setup, bed mob, time EOB, transfers/standign trials, gait w/ rest time, repositioning. Pt cooperative w/ session, states he wants to be able to urinate more. some increased unsteadiness today in static stance and w/ gait. Needs cues for safety, and to correct LOB. continue to anticipate d/c home w/ family. consider home PT if slow to progress   Goals   Patient Goals to feel better   STG Expiration Date 10/01/23   PT Treatment Day 1   Plan   Treatment/Interventions Functional transfer training;LE strengthening/ROM; Therapeutic exercise; Endurance training;Equipment eval/education; Bed mobility;Gait training;Patient/family training   Progress Progressing toward goals   PT Frequency 4-6x/wk   Recommendation   PT Discharge Recommendation   (no needs vs HHPT)   Equipment Recommended Walker  (RW)   Walker Package Recommended Wheeled walker   Change/add to [employer]?  No   AM-PAC Basic Mobility Inpatient   Turning in Flat Bed Without Bedrails 3   Lying on Back to Sitting on Edge of Flat Bed Without Bedrails 2   Moving Bed to Chair 3   Standing Up From Chair Using Arms 3   Walk in Room 3   Climb 3-5 Stairs With Railing 2   Basic Mobility Inpatient Raw Score 16   Basic Mobility Standardized Score 38.32   Highest Level Of Mobility   JH-HLM Goal 5: Stand one or more mins   JH-HLM Achieved 7: Walk 25 feet or more   Spenser Nazario PT, DPT CSRS

## 2023-09-22 NOTE — PROGRESS NOTES
Progress Note - Cardiothoracic Surgery   Clinch Blood 71 y.o. male MRN: [id number]  Unit/Bed#: [facility] 406-01 Encounter: [id number]    Coronary artery disease. S/P coronary artery bypass grafting; POD # 2      24 Hour Events: Went into Afib at approx 1615. Yesterday, recieved Amio bolus and gtt. Remains in rate controlled afib this AM at 80s. Only received 12.5 mg lopressor x 1 at 0830 yesterday. Pain controlled, denies SOB, using IS (1500), weaned to RA. Tolerating diet, denies nausea, +beltching. Required straight cath x 1 overnight. Last BM +9/19.      Medications:   Scheduled Meds:  Current Facility-Administered Medications   Medication Dose Route Frequency Provider Last Rate   • acetaminophen  975 mg Oral Q8H Trey Brown PA-C     • amiodarone (CORDARONE) 900 mg in dextrose 5 % 500 mL infusion  0.5 mg/min Intravenous Continuous Riri Damon PA-C 0.5 mg/min (09/21/23 1655)   • amiodarone  200 mg Oral Q8H [address] Trey Brown PA-C     • aspirin  325 mg Oral Daily Trey Brown PA-C     • atorvastatin  80 mg Oral Daily With Alfredo Spain PA-C     • docusate sodium  100 mg Oral BID Trey Brown PA-C     • fondaparinux  2.5 mg Subcutaneous Daily Trey Brown PA-C     • furosemide  40 mg Intravenous BID (diuretic) Trey Brown PA-C     • insulin lispro  1-6 Units Subcutaneous TID AC Trey Brown PA-C     • insulin lispro  1-6 Units Subcutaneous HS Trey Brown PA-C     • metoprolol tartrate  12.5 mg Oral Q12H [address] Trey Brown PA-C     • ondansetron  4 mg Intravenous Q6H PRN Trey Brown PA-C     • oxyCODONE  5 mg Oral Q4H PRN Trey Brown PA-C     • oxyCODONE  2.5 mg Oral Q4H PRN Trey Brown PA-C     • pantoprazole  40 mg Oral Early Morning Trey Brown PA-C     • potassium chloride  20 mEq Oral BID Trey Brown PA-C     • temazepam  15 mg Oral HS PRN Trey Brown PA-C       Continuous Infusions:amiodarone (CORDARONE) 900 mg in dextrose 5 % 500 mL infusion, 0.5 mg/min, Last Rate: 0.5 mg/min (09/21/23 9919)      PRN Meds:.•  ondansetron  •  oxyCODONE  •  oxyCODONE  •  temazepam    Vitals:   Vitals:    09/21/23 2330 09/22/23 0300 09/22/23 0600 09/22/23 0700   BP:  119/63  118/76   BP Location:       Pulse:  98  88   Resp:    20   Temp:  98.2 °F (36.8 °C)  98.5 °F (36.9 °C)   TempSrc:  Oral  Oral   SpO2: 93% 95%  96%   Weight:   102 kg (224 lb 6.9 oz)    Height:       afebrile    Telemetry: Atrial Fibrillation; Heart Rate: 80s    Respiratory:   SpO2: SpO2: 96 %; Room Air    Intake/Output:     Intake/Output Summary (Last 24 hours) at 9/22/2023 0732  Last data filed at 9/22/2023 0600  Gross per 24 hour   Intake 616.44 ml   Output 1990 ml   Net -1373.56 ml    UOP 1.56L/24, straight cath x 1      Chest tube Output:    Mediastinal tubes: 100 mL/8 hours  240 mL/24 hours   Pleural tubes: 60 mL/8 hours  190 mL/24 hours     Weights:   Weight (last 2 days)     Date/Time Weight    09/22/23 0600 102 (224.43)    09/21/23 0600 110 (242.29)    09/20/23 0643 103 (228)      admit weight: 103 kg      Results:   Results from last 7 days   Lab Units 09/22/23  0613 09/21/23  0429 09/20/23 2027 09/20/23  1221 09/20/23  1219   WBC Thousand/uL 16.41* 16.87*  --   --   --    HEMOGLOBIN g/dL 13.5 13.9 15.4  --  15.2   I STAT HEMOGLOBIN   --   --   --    < >  --    HEMATOCRIT % 39.5 39.9 43.4  --  41.2   HEMATOCRIT, ISTAT   --   --   --    < >  --    PLATELETS Thousands/uL 113* 136*  --   --  122*    < > = values in this interval not displayed.      Results from last 7 days   Lab Units 09/22/23  0510 09/21/23 0429 09/20/23 2027 09/20/23  1619 09/20/23  1221 09/20/23  1219   SODIUM mmol/L 131* 135  --   --   --  137   POTASSIUM mmol/L 4.7 4.5 4.6   < >  --  4.5   CHLORIDE mmol/L 100 106  --   --   --  109*   CO2 mmol/L 23 23  --   --   --  23   CO2, I-STAT mmol/L  --   --   --   --  23  --    BUN mg/dL 18 16  --   --   --  12   CREATININE mg/dL 0.91 0.85  --   --   --  0.74   GLUCOSE, ISTAT mg/dl  -- --   --   --  142*  --    CALCIUM mg/dL 7.9* 7.1*  --   --   --  8.0*    < > = values in this interval not displayed. Point of care glucose: 116 - 146 (no hx of DM)    Studies:  No new studies    I have personally reviewed pertinent films in PACS    Invasive Lines/Tubes:  Invasive Devices     Central Venous Catheter Line  Duration           CVC Central Lines 09/20/23 Triple Right Internal jugular 1 day          Peripheral Intravenous Line  Duration           Peripheral IV 09/20/23 Left Antecubital 1 day          Line  Duration           Pacer Wires 1 day    Pacer Wires 1 day          Drain  Duration           Chest Tube 1 Left Pleural 32 Fr. 1 day    Chest Tube 2 Mediastinal;Posterior 32 Fr. 1 day    Chest Tube 3 Mediastinal;Anterior 32 Fr. 1 day                Physical Exam:    HEENT/NECK:  Normocephalic. Atraumatic. No jugular venous distention. Cardiac: Irregularly irregular rate and rhythm and No murmurs/rubs/gallops  Pulmonary:  Breath sounds clear bilaterally and Poor inspiratory effort  Abdomen:  Non-tender, Non-distended and Normal bowel sounds  Incisions: Sternum is stable. Incision dressed with Acticoat. No erythema or drainage and Saphenectomy incision dressed with Acticoat. No erythema or drainage  Extremities: Extremities warm/dry and 1+ edema B/L  Neuro: Alert and oriented X 3 and No focal deficits  Skin: Warm/Dry, without rashes or lesions. Assessment:  Principal Problem:    Coronary artery disease involving native coronary artery of native heart without angina pectoris  Active Problems:    Hypertension, essential    Combined hyperlipidemia    Impaired fasting glucose    ERIC (obstructive sleep apnea)    S/P CABG x 2       Coronary artery disease. S/P coronary artery bypass grafting; POD # 2    Plan:    1. Cardiac:     Atrial Fibrillation; HR well-controlled.  BP well-controlled  Increase to Lopressor, 25mg PO BID   Continue prophylactic Amiodarone, 200 mg PO TID    Anticoagulated for afib (post op)   F/U INR, dose coumadin accordingly   Continue amio gtt, rebolus    Continue ASA and Statin therapy  Epicardial pacing wires no longer required. Remove today  Maintain central IV access today for medications requiring central IV access  Continue DVT prophylaxis    2. Pulmonary:   Good Room air oxygen saturation; Continue incentive spirometry/Coughing/Deep breathing exercises  Chest tube drainage diminished; D/C today    3. Renal:   Post op Creatinine stable; Follow up labs prn   Intake/Output net: -1368 mL/24 hours  Diuretic Regimen:  Increase to IV Lasix, 60 mg BID  Continue Potassium Chloride 20 mEq PO BID  Start flomax    4. Neuro:  Neurologically intact; No active issues  Incisional pain well-controlled  Continue Tylenol, 975 mg PO q 8, standing dose  Continue Oxycodone, 2.5 to 5 mg PO q 4 hours prn pain    5. GI:  Cardiac diet, with 1800 mL fluid restriction  Continue stool softeners and prn suppository  Continue GI prophylaxis  Increase bowel regimen    6. Endo:   Glucose well-controlled with sliding scale coverage    7    Hematology:    Post-operative blood count acceptable; Trend prn  Thrombocytopenia    8.    Disposition:      Following daily PT/OT recommendations- no needs  Anticipate discharge 9/24      VTE Pharmacologic Prophylaxis: Fondaparinux (Arixtra)  VTE Mechanical Prophylaxis: sequential compression device    Collaborative rounds completed with supervising physician  Plan of care discussed with bedside nurse    SIGNATURE: Kasie Watson PA-C  DATE: September 22, 2023  TIME: 7:32 AM

## 2023-09-23 LAB
ANION GAP SERPL CALCULATED.3IONS-SCNC: 5 MMOL/L
BASOPHILS # BLD AUTO: 0.05 THOUSANDS/ÂΜL (ref 0–0.1)
BASOPHILS NFR BLD AUTO: 1 % (ref 0–1)
BUN SERPL-MCNC: 18 MG/DL (ref 5–25)
CALCIUM SERPL-MCNC: 7.8 MG/DL (ref 8.4–10.2)
CHLORIDE SERPL-SCNC: 100 MMOL/L (ref 96–108)
CO2 SERPL-SCNC: 27 MMOL/L (ref 21–32)
CREAT SERPL-MCNC: 0.83 MG/DL (ref 0.6–1.3)
EOSINOPHIL # BLD AUTO: 0.12 THOUSAND/ÂΜL (ref 0–0.61)
EOSINOPHIL NFR BLD AUTO: 1 % (ref 0–6)
ERYTHROCYTE [DISTWIDTH] IN BLOOD BY AUTOMATED COUNT: 12.5 % (ref 11.6–15.1)
GFR SERPL CREATININE-BSD FRML MDRD: 88 ML/MIN/1.73SQ M
GLUCOSE SERPL-MCNC: 123 MG/DL (ref 65–140)
GLUCOSE SERPL-MCNC: 127 MG/DL (ref 65–140)
GLUCOSE SERPL-MCNC: 165 MG/DL (ref 65–140)
GLUCOSE SERPL-MCNC: 87 MG/DL (ref 65–140)
HCT VFR BLD AUTO: 35.6 % (ref 36.5–49.3)
HGB BLD-MCNC: 12.3 G/DL (ref 12–17)
IMM GRANULOCYTES # BLD AUTO: 0.05 THOUSAND/UL (ref 0–0.2)
IMM GRANULOCYTES NFR BLD AUTO: 1 % (ref 0–2)
INR PPP: 1.26 (ref 0.84–1.19)
LYMPHOCYTES # BLD AUTO: 1.69 THOUSANDS/ÂΜL (ref 0.6–4.47)
LYMPHOCYTES NFR BLD AUTO: 16 % (ref 14–44)
MAGNESIUM SERPL-MCNC: 1.8 MG/DL (ref 1.9–2.7)
MCH RBC QN AUTO: 31.6 PG (ref 26.8–34.3)
MCHC RBC AUTO-ENTMCNC: 34.6 G/DL (ref 31.4–37.4)
MCV RBC AUTO: 92 FL (ref 82–98)
MONOCYTES # BLD AUTO: 1.2 THOUSAND/ÂΜL (ref 0.17–1.22)
MONOCYTES NFR BLD AUTO: 12 % (ref 4–12)
NEUTROPHILS # BLD AUTO: 7.23 THOUSANDS/ÂΜL (ref 1.85–7.62)
NEUTS SEG NFR BLD AUTO: 69 % (ref 43–75)
NRBC BLD AUTO-RTO: 0 /100 WBCS
PLATELET # BLD AUTO: 92 THOUSANDS/UL (ref 149–390)
PMV BLD AUTO: 10 FL (ref 8.9–12.7)
POTASSIUM SERPL-SCNC: 3.9 MMOL/L (ref 3.5–5.3)
PROTHROMBIN TIME: 16 SECONDS (ref 11.6–14.5)
RBC # BLD AUTO: 3.89 MILLION/UL (ref 3.88–5.62)
SODIUM SERPL-SCNC: 132 MMOL/L (ref 135–147)
WBC # BLD AUTO: 10.34 THOUSAND/UL (ref 4.31–10.16)

## 2023-09-23 PROCEDURE — 82948 REAGENT STRIP/BLOOD GLUCOSE: CPT

## 2023-09-23 PROCEDURE — 99024 POSTOP FOLLOW-UP VISIT: CPT | Performed by: STUDENT IN AN ORGANIZED HEALTH CARE EDUCATION/TRAINING PROGRAM

## 2023-09-23 PROCEDURE — 93005 ELECTROCARDIOGRAM TRACING: CPT

## 2023-09-23 PROCEDURE — 85025 COMPLETE CBC W/AUTO DIFF WBC: CPT | Performed by: PHYSICIAN ASSISTANT

## 2023-09-23 PROCEDURE — 99232 SBSQ HOSP IP/OBS MODERATE 35: CPT | Performed by: INTERNAL MEDICINE

## 2023-09-23 PROCEDURE — 83735 ASSAY OF MAGNESIUM: CPT | Performed by: PHYSICIAN ASSISTANT

## 2023-09-23 PROCEDURE — 85610 PROTHROMBIN TIME: CPT | Performed by: PHYSICIAN ASSISTANT

## 2023-09-23 PROCEDURE — 80048 BASIC METABOLIC PNL TOTAL CA: CPT | Performed by: PHYSICIAN ASSISTANT

## 2023-09-23 RX ORDER — WARFARIN SODIUM 5 MG/1
5 TABLET ORAL
Status: COMPLETED | OUTPATIENT
Start: 2023-09-23 | End: 2023-09-23

## 2023-09-23 RX ORDER — BISACODYL 10 MG
10 SUPPOSITORY, RECTAL RECTAL DAILY PRN
Status: DISCONTINUED | OUTPATIENT
Start: 2023-09-23 | End: 2023-09-24 | Stop reason: HOSPADM

## 2023-09-23 RX ORDER — LANOLIN ALCOHOL/MO/W.PET/CERES
400 CREAM (GRAM) TOPICAL ONCE
Status: COMPLETED | OUTPATIENT
Start: 2023-09-23 | End: 2023-09-23

## 2023-09-23 RX ORDER — POTASSIUM CHLORIDE 20 MEQ/1
40 TABLET, EXTENDED RELEASE ORAL ONCE
Status: COMPLETED | OUTPATIENT
Start: 2023-09-23 | End: 2023-09-23

## 2023-09-23 RX ORDER — ASPIRIN 81 MG/1
81 TABLET, CHEWABLE ORAL DAILY
Status: DISCONTINUED | OUTPATIENT
Start: 2023-09-23 | End: 2023-09-24

## 2023-09-23 RX ADMIN — FONDAPARINUX SODIUM 2.5 MG: 2.5 INJECTION, SOLUTION SUBCUTANEOUS at 09:23

## 2023-09-23 RX ADMIN — ACETAMINOPHEN 975 MG: 325 TABLET, FILM COATED ORAL at 22:15

## 2023-09-23 RX ADMIN — MAGNESIUM OXIDE TAB 400 MG (241.3 MG ELEMENTAL MG) 400 MG: 400 (241.3 MG) TAB at 09:24

## 2023-09-23 RX ADMIN — FUROSEMIDE 60 MG: 10 INJECTION, SOLUTION INTRAMUSCULAR; INTRAVENOUS at 09:24

## 2023-09-23 RX ADMIN — INSULIN LISPRO 1 UNITS: 100 INJECTION, SOLUTION INTRAVENOUS; SUBCUTANEOUS at 11:09

## 2023-09-23 RX ADMIN — BISACODYL 10 MG: 10 SUPPOSITORY RECTAL at 17:28

## 2023-09-23 RX ADMIN — PANTOPRAZOLE SODIUM 40 MG: 40 TABLET, DELAYED RELEASE ORAL at 05:17

## 2023-09-23 RX ADMIN — ASPIRIN 81 MG CHEWABLE TABLET 81 MG: 81 TABLET CHEWABLE at 09:23

## 2023-09-23 RX ADMIN — POTASSIUM CHLORIDE 40 MEQ: 1500 TABLET, EXTENDED RELEASE ORAL at 09:26

## 2023-09-23 RX ADMIN — SENNOSIDES 8.6 MG: 8.6 TABLET, FILM COATED ORAL at 22:13

## 2023-09-23 RX ADMIN — DOCUSATE SODIUM 100 MG: 100 CAPSULE, LIQUID FILLED ORAL at 09:24

## 2023-09-23 RX ADMIN — METOPROLOL TARTRATE 25 MG: 25 TABLET, FILM COATED ORAL at 22:00

## 2023-09-23 RX ADMIN — TEMAZEPAM 15 MG: 15 CAPSULE ORAL at 22:13

## 2023-09-23 RX ADMIN — ACETAMINOPHEN 975 MG: 325 TABLET, FILM COATED ORAL at 14:23

## 2023-09-23 RX ADMIN — AMIODARONE HYDROCHLORIDE 200 MG: 200 TABLET ORAL at 22:13

## 2023-09-23 RX ADMIN — ATORVASTATIN CALCIUM 80 MG: 80 TABLET, FILM COATED ORAL at 17:09

## 2023-09-23 RX ADMIN — METOPROLOL TARTRATE 25 MG: 25 TABLET, FILM COATED ORAL at 09:24

## 2023-09-23 RX ADMIN — DOCUSATE SODIUM 100 MG: 100 CAPSULE, LIQUID FILLED ORAL at 17:10

## 2023-09-23 RX ADMIN — POTASSIUM CHLORIDE 20 MEQ: 1500 TABLET, EXTENDED RELEASE ORAL at 09:23

## 2023-09-23 RX ADMIN — AMIODARONE HYDROCHLORIDE 200 MG: 200 TABLET ORAL at 05:17

## 2023-09-23 RX ADMIN — FUROSEMIDE 60 MG: 10 INJECTION, SOLUTION INTRAMUSCULAR; INTRAVENOUS at 17:09

## 2023-09-23 RX ADMIN — POLYETHYLENE GLYCOL 3350 17 G: 17 POWDER, FOR SOLUTION ORAL at 09:23

## 2023-09-23 RX ADMIN — POTASSIUM CHLORIDE 20 MEQ: 1500 TABLET, EXTENDED RELEASE ORAL at 17:09

## 2023-09-23 RX ADMIN — Medication 2.5 MG: at 22:13

## 2023-09-23 RX ADMIN — TAMSULOSIN HYDROCHLORIDE 0.8 MG: 0.4 CAPSULE ORAL at 17:09

## 2023-09-23 RX ADMIN — ACETAMINOPHEN 975 MG: 325 TABLET, FILM COATED ORAL at 09:24

## 2023-09-23 RX ADMIN — AMIODARONE HYDROCHLORIDE 200 MG: 200 TABLET ORAL at 14:23

## 2023-09-23 RX ADMIN — WARFARIN SODIUM 5 MG: 5 TABLET ORAL at 17:09

## 2023-09-23 RX ADMIN — Medication 2.5 MG: at 09:33

## 2023-09-23 NOTE — PROGRESS NOTES
Progress Note - Cardiothoracic Surgery   Nelly Valdez 71 y.o. male MRN: [id number]  Unit/Bed#: [facility] 406-01 Encounter: [id number]    Coronary artery disease. S/P coronary artery bypass grafting; POD # 3      24 Hour Events: Continues in rate controlled afib. Did not receive PM dose of BB due to parameters not met. Pain controlled, denies SOB, using IS (). Tolerating diet, Now passing flatus. Last BM +. No further straight cath required.      Medications:   Scheduled Meds:  Current Facility-Administered Medications   Medication Dose Route Frequency Provider Last Rate   • acetaminophen  975 mg Oral Q8H Noy Ackerman PA-C     • amiodarone (CORDARONE) 900 mg in dextrose 5 % 500 mL infusion  0.5 mg/min Intravenous Continuous Leonardo Peraza PA-C 0.5 mg/min (23)   • amiodarone  200 mg Oral [facility] Noy Ackerman PA-C     • aspirin  325 mg Oral Daily Noy Ackerman PA-C     • atorvastatin  80 mg Oral Daily With Lavena Zachary Spain PA-C     • docusate sodium  100 mg Oral BID Noy Ackerman PA-C     • fondaparinux  2.5 mg Subcutaneous Daily Noy Ackerman PA-C     • furosemide  60 mg Intravenous BID (diuretic) Ac Rogers PA-C     • insulin lispro  1-6 Units Subcutaneous TID AC Noy Ackerman PA-C     • insulin lispro  1-6 Units Subcutaneous HS Noy Ackerman PA-C     • metoprolol tartrate  25 mg Oral Q12H [address] Ac Rogers PA-C     • ondansetron  4 mg Intravenous Q6H PRN Noy Ackerman PA-C     • oxyCODONE  5 mg Oral Q4H PRN Noy Ackerman PA-C     • oxyCODONE  2.5 mg Oral Q4H PRN Noy Ackerman PA-C     • pantoprazole  40 mg Oral Early Morning Noy Ackerman PA-C     • polyethylene glycol  17 g Oral Daily Ac Rogers PA-C     • potassium chloride  20 mEq Oral BID Noy Ackerman PA-C     • senna  1 tablet Oral HS Ac Rogers PA-C     • tamsulosin  0.8 mg Oral Daily With FERNY Adame     • temazepam  15 mg Oral HS PRN Noy GrandchildFERNY       Continuous Infusions:amiodarone (CORDARONE) 900 mg in dextrose 5 % 500 mL infusion, 0.5 mg/min, Last Rate: 0.5 mg/min (09/22/23 2159)      PRN Meds:.•  ondansetron  •  oxyCODONE  •  oxyCODONE  •  temazepam    Vitals:   Vitals:    09/22/23 2106 09/22/23 2237 09/23/23 0420 09/23/23 0600   BP: 98/65 107/69 106/68    BP Location:  Right arm Right arm    Pulse: 77 73 76    Resp:   20    Temp:  98.3 °F (36.8 °C) 97.8 °F (36.6 °C)    TempSrc:  Oral Oral    SpO2:  95% 93%    Weight:    99.9 kg (220 lb 3.8 oz)   Height:       afebrile    Telemetry: Atrial Fibrillation; Heart Rate: 70s- 80s    Respiratory:   SpO2: SpO2: 93 %; Room Air    Intake/Output:     Intake/Output Summary (Last 24 hours) at 9/23/2023 4996  Last data filed at 9/23/2023 0401  Gross per 24 hour   Intake 540 ml   Output 2650 ml   Net -2110 ml   since admit +600    Weights:   Weight (last 2 days)     Date/Time Weight    09/23/23 0600 99.9 (220.24)    09/22/23 0600 102 (224.43)    09/21/23 0600 110 (242.29)      admit weight: 103 kg      Results:   Results from last 7 days   Lab Units 09/23/23  0424 09/22/23  0613 09/21/23  0429   WBC Thousand/uL 10.34* 16.41* 16.87*   HEMOGLOBIN g/dL 12.3 13.5 13.9   HEMATOCRIT % 35.6* 39.5 39.9   PLATELETS Thousands/uL 92* 113* 136*     Results from last 7 days   Lab Units 09/23/23  0424 09/22/23  0510 09/21/23  0429 09/20/23  1619 09/20/23  1221   SODIUM mmol/L 132* 131* 135  --   --    POTASSIUM mmol/L 3.9 4.7 4.5   < >  --    CHLORIDE mmol/L 100 100 106  --   --    CO2 mmol/L 27 23 23  --   --    CO2, I-STAT mmol/L  --   --   --   --  23   BUN mg/dL 18 18 16  --   --    CREATININE mg/dL 0.83 0.91 0.85  --   --    GLUCOSE, ISTAT mg/dl  --   --   --   --  142*   CALCIUM mg/dL 7.8* 7.9* 7.1*  --   --     < > = values in this interval not displayed.      Mg- 1.8     Results from last 7 days   Lab Units 09/23/23  0424 09/22/23  1247   INR  1.26* 1.22*     Coumadin:   9/22- 5 mg    Point of care glucose: 118 - 147 (no hx of DM)    Studies:  No new studies    I have personally reviewed pertinent films in PACS    Invasive Lines/Tubes:  Invasive Devices     Central Venous Catheter Line  Duration           CVC Central Lines 09/20/23 Triple Right Internal jugular 2 days          Peripheral Intravenous Line  Duration           Peripheral IV 09/20/23 Left Antecubital 2 days          Line  Duration           Pacer Wires 2 days    Pacer Wires 2 days              Physical Exam:    HEENT/NECK:  Normocephalic. Atraumatic. No jugular venous distention. Cardiac: Irregularly irregular rate and rhythm  Pulmonary:  Breath sounds clear bilaterally and No rales/rhonchi/wheezes  Abdomen:  Non-tender, Non-distended and Normal bowel sounds  Incisions: Sternum is stable. Incision is clean, dry, and intact. and Saphenectomy incison is clean, dry, and intact. Extremities: Extremities warm/dry and 1+ edema B/L  Neuro: Alert and oriented X 3 and No focal deficits  Skin: Warm/Dry, without rashes or lesions. Assessment:  Principal Problem:    Coronary artery disease involving native coronary artery of native heart without angina pectoris  Active Problems:    Hypertension, essential    Combined hyperlipidemia    Impaired fasting glucose    ERIC (obstructive sleep apnea)    S/P CABG x 2       Coronary artery disease. S/P coronary artery bypass grafting; POD # 3    Plan:    1. Cardiac:     Atrial Fibrillation; HR well-controlled. BP well-controlled  Continue Lopressor, 25mg PO BID   Continue prophylactic Amiodarone, 200 mg PO TID    Anticoagulated for afib (post op)   INR 1.27, dose 5 mg of coumadin   Continue amio gtt   Replete K/Mg- continue to monitor    Decrease ASA (81 mg) w/ thrombocytopenia and coumadin dosing and continue Statin therapy  Epicardial pacing wires have been removed  Maintain central IV access today for medications requiring central IV access  Continue DVT prophylaxis    2.  Pulmonary:   Good Room air oxygen saturation; Continue incentive spirometry/Coughing/Deep breathing exercises  Chest tubes have been discontinued    3. Renal:   Post op Creatinine stable; Follow up labs prn   Intake/Output net: -2.1 L/24 hours (since admit +600mL)  Diuretic Regimen:  Continue IV Lasix, 60 mg BID for today  Continue Potassium Chloride 20 mEq PO BID  Continue Flomax and urinary retention protocol    4. Neuro:  Neurologically intact; No active issues  Incisional pain well-controlled  Continue Tylenol, 975 mg PO q 8, standing dose  Continue Oxycodone, 2.5 to 5 mg PO q 4 hours prn pain    5. GI:  Cardiac diet, with 1800 mL fluid restriction  Continue stool softeners and prn suppository  Continue GI prophylaxis  Continue bowel regimen    6. Endo:   Glucose well-controlled with sliding scale coverage    7    Hematology:    Post-operative blood count acceptable; Trend prn  Thrombocytopenia    8.    Disposition:      Following daily PT/OT recommendations- no needs  Anticipate discharge 9/24      VTE Pharmacologic Prophylaxis: Fondaparinux (Arixtra)  VTE Mechanical Prophylaxis: sequential compression device    Collaborative rounds completed with supervising physician  Plan of care discussed with bedside nurse    SIGNATURE: Barbra Ramos PA-C  DATE: September 23, 2023  TIME: 7:12 AM

## 2023-09-23 NOTE — PROGRESS NOTES
[facility]    Cardiology Progress Note  Espinoza Dupree 71 y.o. male   YOB: 1950 MRN: [id number]  Unit/Bed#: [facility] 406-01 Encounter: [id number]      Subjective:   He remains in atrial fibrillation despite ongoing IV amiodarone loading. But remains rate controlled with this. Reports back discomfort, but otherwise no major issues with chest pain or shortness of breath. Assessments  78-year-old gentleman had been having mild shortness of breath with exertion and had undergone nuclear stress test earlier this year. This had revealed apical infarct and mildly reduced EF. His echocardiogram subsequently confirmed similar findings of reduced EF and as result was recommended cardiac catheterization. This revealed multivessel coronary artery disease and he was referred for CABG.    He underwent two-vessel CABG yesterday and is doing well postoperatively. He was transferred out of the ICU and we are consulted for further assistance with cardiac management. He reports feeling well overall and denies any major pain other than mild incision site discomfort.     Principal Problem:    Coronary artery disease involving native coronary artery of native heart without angina pectoris  Active Problems:    Hypertension, essential    Combined hyperlipidemia    Impaired fasting glucose    ERIC (obstructive sleep apnea)    S/P CABG x 2    Assessment  1. S/p CABG x2  ? LIMA-LAD, SVG-RPDA  ? For Multivessel coronary artery disease  2. Ischemic cardiomyopathy  3. CAD status post remote PCI  4. Postoperative atrial fibrillation   5. Hypertension  6.  Hyperlipidemia     Plan  • Remains in A-fib, rate controlled, but otherwise asymptomatic  • Continue IV amiodarone infusion plus p.o. amiodarone loading   • Plan to continue amiodarone 200 mg bid x 10 days, and then 200 mg daily at discharge  • If remains in A-fib at postop follow-up, then can plan on CALOS/cardioversion attempt at 1 month  • Continue aspirin, metoprolol 12.5 mg twice daily, atorvastatin  • Continue coumadin - dosing per CTS to reach goal INR 2-3  • Chest tubes removed yesterday  • Diuresing well with IV Lasix 60 mg twice daily  • Urine output 2.6 L yesterday  • SPO2 94% on room air this morning  • Still mildly volume overloaded --> Continue IV diuresis today, and plan to transition to p.o. tomorrow    Discussed with cardiothoracic surgery Marcela Riley PA-C. Review of Systems   All other systems reviewed and are negative. Telemetry Review: Afib, remains rate controlled. HR . No other significant events    Objective:   Vitals: Blood pressure 128/76, pulse 97, temperature 98.1 °F (36.7 °C), temperature source Axillary, resp. rate 18, height 5' 9.69" (1.77 m), weight 99.9 kg (220 lb 3.8 oz), SpO2 94 %. , Body mass index is 31.89 kg/m².,   Orthostatic Blood Pressures    Flowsheet Row Most Recent Value   Blood Pressure 128/76 filed at 09/23/2023 0753   Patient Position - Orthostatic VS Sitting filed at 09/23/2023 7666         Systolic (07LAR), QHA:120 , Min:98 , JEU:204     Diastolic (99XHI), XNI:64, Min:65, Max:79    Wt Readings from Last 5 Encounters:   09/23/23 99.9 kg (220 lb 3.8 oz)   09/08/23 105 kg (232 lb 4.8 oz)   09/06/23 105 kg (231 lb 7.7 oz)   08/17/23 106 kg (233 lb)   07/31/23 105 kg (231 lb)     I/O       09/20 0701 09/21 0700 09/21 0701  09/22 0700 09/22 0701 09/23 0700    P. O. 240 180 300    I.V. (mL/kg) 5243.3 (47.7) 241.2 (2.4)     IV Piggyback 1700 200     Cell Saver 750      Total Intake(mL/kg) 7933.3 (72.1) 621.2 (6.1) 300 (2.9)    Urine (mL/kg/hr) 2760 (1) 1560 (0.6) 150 (0.3)    Blood 500      Chest Tube 615 430     Total Output 3875 1990 150    Net +4058.3 -1368.8 +150                     Physical Exam  Vitals and nursing note reviewed. Constitutional:       General: He is not in acute distress. Appearance: Normal appearance. He is well-developed. He is obese. He is not ill-appearing or diaphoretic. HENT:      Head: Normocephalic and atraumatic. Nose: No congestion. Eyes:      General: No scleral icterus. Conjunctiva/sclera: Conjunctivae normal.   Neck:      Vascular: No carotid bruit or JVD. Cardiovascular:      Rate and Rhythm: Normal rate. Rhythm irregular. Heart sounds: Normal heart sounds. No murmur heard. No friction rub. No gallop. Pulmonary:      Effort: Pulmonary effort is normal. No respiratory distress. Breath sounds: Normal breath sounds. No wheezing or rales. Chest:      Chest wall: No tenderness. Abdominal:      General: There is no distension. Palpations: Abdomen is soft. Tenderness: There is no abdominal tenderness. Musculoskeletal:         General: No swelling, tenderness or deformity. Cervical back: Neck supple. No muscular tenderness. Right lower leg: Edema present. Left lower leg: Edema present. Comments: Trace to 1+ bilateral lower extremity edema   Skin:     General: Skin is warm. Neurological:      General: No focal deficit present. Mental Status: He is alert and oriented to person, place, and time. Mental status is at baseline. Psychiatric:         Mood and Affect: Mood normal.         Behavior: Behavior normal.         Thought Content:  Thought content normal.         Laboratory Results: personally reviewed        CBC with diff:   Results from last 7 days   Lab Units 09/23/23  0424 09/22/23  0613 09/21/23  0429 09/20/23  2027 09/20/23  1221 09/20/23  1219 09/20/23  1140 09/20/23  1104 09/20/23  1103   WBC Thousand/uL 10.34* 16.41* 16.87*  --   --   --   --   --   --    HEMOGLOBIN g/dL 12.3 13.5 13.9 15.4  --  15.2  --   --   --    I STAT HEMOGLOBIN g/dl  --   --   --   --  12.9  --  12.2   < >  --    HEMATOCRIT % 35.6* 39.5 39.9 43.4  --  41.2  --   --   --    HEMATOCRIT, ISTAT %  --   --   --   --  38  --  36*   < >  --    MCV fL 92 93 91  --   --   --   --   --   --    PLATELETS Thousands/uL 92* 113* 136*  -- --  122*  --   --  128*   RBC Million/uL 3.89 4.27 4.40  --   --   --   --   --   --    MCH pg 31.6 31.6 31.6  --   --   --   --   --   --    MCHC g/dL 34.6 34.2 34.8  --   --   --   --   --   --    RDW % 12.5 12.9 12.8  --   --   --   --   --   --    MPV fL 10.0 10.1 9.7  --   --  9.7  --   --  9.5   NRBC AUTO /100 WBCs 0  --   --   --   --   --   --   --   --     < > = values in this interval not displayed.          CMP:  Results from last 7 days   Lab Units 09/23/23  0424 09/22/23  0510 09/21/23  0429 09/20/23  2027 09/20/23  1619 09/20/23  1221 09/20/23  1219 09/20/23  1140 09/20/23  1104 09/20/23  1044 09/20/23  1009 09/20/23  0905   POTASSIUM mmol/L 3.9 4.7 4.5 4.6 4.3  --  4.5  --   --   --   --   --    CHLORIDE mmol/L 100 100 106  --   --   --  109*  --   --   --   --   --    CO2 mmol/L 27 23 23  --   --   --  23  --   --   --   --   --    CO2, I-STAT mmol/L  --   --   --   --   --  23  --  22 24 25 23 25   BUN mg/dL 18 18 16  --   --   --  12  --   --   --   --   --    CREATININE mg/dL 0.83 0.91 0.85  --   --   --  0.74  --   --   --   --   --    GLUCOSE, ISTAT mg/dl  --   --   --   --   --  142*  --  137 131 129 121 103   CALCIUM mg/dL 7.8* 7.9* 7.1*  --   --   --  8.0*  --   --   --   --   --    EGFR ml/min/1.73sq m 88 84 87  --   --   --  92  --   --   --   --   --          BMP:  Results from last 7 days   Lab Units 09/23/23  0424 09/22/23  0510 09/21/23  0429 09/20/23 2027 09/20/23  1619 09/20/23  1221 09/20/23  1219 09/20/23  1140 09/20/23  1104   POTASSIUM mmol/L 3.9 4.7 4.5 4.6 4.3  --  4.5  --   --    CHLORIDE mmol/L 100 100 106  --   --   --  109*  --   --    CO2 mmol/L 27 23 23  --   --   --  23  --   --    CO2, I-STAT mmol/L  --   --   --   --   --  23  --  22 24   BUN mg/dL 18 18 16  --   --   --  12  --   --    CREATININE mg/dL 0.83 0.91 0.85  --   --   --  0.74  --   --    GLUCOSE, ISTAT mg/dl  --   --   --   --   --  142*  --  137 131   CALCIUM mg/dL 7.8* 7.9* 7.1*  --   --   --  8.0*  -- --        BNP: No results for input(s): "BNP" in the last 72 hours.     Magnesium:   Results from last 7 days   Lab Units 09/23/23  0424 09/22/23  1008 09/21/23  0429   MAGNESIUM mg/dL 1.8* 2.0 2.3       Coags:   Results from last 7 days   Lab Units 09/23/23  0424 09/22/23  1247   INR  1.26* 1.22*       TSH:        Hemoglobin A1C       Lipid Profile:       Meds/Allergies   all current active meds have been reviewed and current meds:   Current Facility-Administered Medications   Medication Dose Route Frequency   • acetaminophen (TYLENOL) tablet 975 mg  975 mg Oral Q8H   • amiodarone (CORDARONE) 900 mg in dextrose 5 % 500 mL infusion  0.5 mg/min Intravenous Continuous   • amiodarone tablet 200 mg  200 mg Oral Q8H [address]   • aspirin chewable tablet 81 mg  81 mg Oral Daily   • atorvastatin (LIPITOR) tablet 80 mg  80 mg Oral Daily With Dinner   • docusate sodium (COLACE) capsule 100 mg  100 mg Oral BID   • fondaparinux (ARIXTRA) subcutaneous injection 2.5 mg  2.5 mg Subcutaneous Daily   • furosemide (LASIX) injection 60 mg  60 mg Intravenous BID (diuretic)   • insulin lispro (HumaLOG) 100 units/mL subcutaneous injection 1-6 Units  1-6 Units Subcutaneous TID AC   • insulin lispro (HumaLOG) 100 units/mL subcutaneous injection 1-6 Units  1-6 Units Subcutaneous HS   • metoprolol tartrate (LOPRESSOR) tablet 25 mg  25 mg Oral Q12H INGRID   • ondansetron (ZOFRAN) injection 4 mg  4 mg Intravenous Q6H PRN   • oxyCODONE (ROXICODONE) IR tablet 5 mg  5 mg Oral Q4H PRN   • oxyCODONE (ROXICODONE) split tablet 2.5 mg  2.5 mg Oral Q4H PRN   • pantoprazole (PROTONIX) EC tablet 40 mg  40 mg Oral Early Morning   • polyethylene glycol (MIRALAX) packet 17 g  17 g Oral Daily   • potassium chloride (K-DUR,KLOR-CON) CR tablet 20 mEq  20 mEq Oral BID   • senna (SENOKOT) tablet 8.6 mg  1 tablet Oral HS   • tamsulosin (FLOMAX) capsule 0.8 mg  0.8 mg Oral Daily With Dinner   • temazepam (RESTORIL) capsule 15 mg  15 mg Oral HS PRN   • warfarin (COUMADIN) tablet 5 mg  5 mg Oral Once (warfarin)     Medications Prior to Admission   Medication   • aspirin 81 MG tablet   • atorvastatin (LIPITOR) 20 mg tablet   • metoprolol succinate (TOPROL-XL) 50 mg 24 hr tablet   • isosorbide mononitrate (IMDUR) 30 mg 24 hr tablet   • lisinopril (ZESTRIL) 20 mg tablet   • mupirocin (BACTROBAN) 2 % ointment   • nitroglycerin (NITROSTAT) 0.4 mg SL tablet     amiodarone (CORDARONE) 900 mg in dextrose 5 % 500 mL infusion, 0.5 mg/min, Last Rate: 0.5 mg/min (23 5873)          Cardiac testing: reviewed  Results for orders placed during the hospital encounter of 20    Echo complete with contrast if indicated    Narrative  [address] [place] [address]  [phone]    Transthoracic Echocardiogram  2D, M-mode, Doppler, and Color Doppler    Study date:  2020    Patient: Lorrie Ovalle  MR number: [id number]  Account number: [de-identified]  : 1950  Age: 77 years  Gender: Male  Status: Outpatient  [facility]: [facility]  Height: 70 in  Weight: 225.5 lb  BP: 140/ 82 mmHg    Indications: CAD. Diagnoses: I25.10 - Atherosclerotic heart disease of native coronary artery without angina pectoris    Sonographer:  Wilson RCS  Primary Physician:  Beryle Juneau, MD  Referring Physician:  Tiffany Trevizo MD  Group:  [facility]  Interpreting Physician:  Gay Álvarez MD    SUMMARY    LEFT VENTRICLE:  Systolic function was normal. LVEF 50-55%  Cannot rule out mild hypokinesis of the mid anteroseptal wall(s). LEFT ATRIUM:  The atrium was mildly dilated. MITRAL VALVE:  There was mild annular calcification. There was trace regurgitation. TRICUSPID VALVE:  There was trace regurgitation. PULMONIC VALVE:  There was trace regurgitation. HISTORY: PRIOR HISTORY: CAD Risk factors: hypertension. PROCEDURE: The study was performed in the [facility].  This was a routine study. The transthoracic approach was used. The study included complete 2D imaging, M-mode, complete spectral Doppler, and color Doppler. The  heart rate was 56 bpm, at the start of the study. Image quality was adequate. LEFT VENTRICLE: Size was normal. Systolic function was normal. LVEF 50-55% Cannot rule out mild hypokinesis of the mid anteroseptal wall(s). RIGHT VENTRICLE: The size was normal. Systolic function was normal.    LEFT ATRIUM: The atrium was mildly dilated. RIGHT ATRIUM: Size was normal.    MITRAL VALVE: There was mild annular calcification. DOPPLER: There was no evidence for stenosis. There was trace regurgitation. AORTIC VALVE: The valve was trileaflet. Leaflets exhibited normal thickness and normal cuspal separation. DOPPLER: Transaortic velocity was within the normal range. There was no evidence for stenosis. There was no regurgitation. TRICUSPID VALVE: There was normal leaflet separation. DOPPLER: The transtricuspid velocity was within the normal range. There was no evidence for stenosis. There was trace regurgitation. PULMONIC VALVE: Not well visualized. DOPPLER: There was trace regurgitation. PERICARDIUM: There was no pericardial effusion. AORTA: The root exhibited normal size. SYSTEM MEASUREMENT TABLES    2D  %FS: 38.86 %  Ao Diam: 3.2 cm  EDV(Teich): 144.38 ml  EF(Teich): 68.7 %  ESV(Teich): 45.18 ml  IVSd: 1.2 cm  LA Area: 16.38 cm2  LA Diam: 4.24 cm  LVEDV MOD A4C: 92.09 ml  LVEF MOD A4C: 44.57 %  LVESV MOD A4C: 51.05 ml  LVIDd: 5.45 cm  LVIDs: 3.33 cm  LVLd A4C: 8.67 cm  LVLs A4C: 8.35 cm  LVPWd: 1.31 cm  RA Area: 17.42 cm2  RVIDd: 4.02 cm  RWT: 0.48  SV MOD A4C: 41.04 ml  SV(Teich): 99.19 ml    CW  AV Env. Ti: 266.41 ms  AV MaxPG: 3.15 mmHg  AV VTI: 17.11 cm  AV Vmax: 0.89 m/s  AV Vmean: 0.64 m/s  AV meanP.83 mmHg    MM  TAPSE: 2.33 cm    PW  E': 0.07 m/s  E/E': 6.39  LVOT Env. Ti: 293.05 ms  LVOT VTI: 16.82 cm  LVOT Vmax: 0.78 m/s  LVOT Vmean: 0.57 m/s  LVOT maxP.4 mmHg  LVOT meanP.41 mmHg  MV A Frank: 0.49 m/s  MV Dec [place]: 1.62 m/s2  MV DecT: 278.28 ms  MV E Frank: 0.45 m/s  MV E/A Ratio: 0.91  MV PHT: 80.7 ms  MVA By PHT: 2.73 cm2    [facility] Accredited Echocardiography Laboratory    Prepared and electronically signed by    Gabino Martell MD  Signed 2020 13:24:00    No results found for this or any previous visit. No results found for this or any previous visit. Results for orders placed during the hospital encounter of 23    NM myocardial perfusion spect (stress and/or rest)    Interpretation Summary  1. Good exercise tolerance-10.1 METS  2. Resting EKG suggesting anteroseptal scar  3. Negative EKG portion of stress test  4. There is a moderate-sized fixed apical defect of severe intensity indicating previous apical scar  5. There is a moderate intensity fixed septal defect indicating prior septal scar  6. There is a mild intensity small mid anterior wall reversible  defect most consistent with mid anterior wall ischemia. 7.  Apical hypokinesis and generalized hypokinesis. Calculated ejection fraction 44%  8.   No chest discomfort with exercise

## 2023-09-24 VITALS
HEART RATE: 81 BPM | SYSTOLIC BLOOD PRESSURE: 114 MMHG | BODY MASS INDEX: 33.96 KG/M2 | OXYGEN SATURATION: 96 % | TEMPERATURE: 98.3 F | WEIGHT: 237.22 LBS | RESPIRATION RATE: 18 BRPM | DIASTOLIC BLOOD PRESSURE: 55 MMHG | HEIGHT: 70 IN

## 2023-09-24 LAB
ANION GAP SERPL CALCULATED.3IONS-SCNC: 5 MMOL/L
ATRIAL RATE: 81 BPM
BASOPHILS # BLD AUTO: 0.06 THOUSANDS/ÂΜL (ref 0–0.1)
BASOPHILS NFR BLD AUTO: 1 % (ref 0–1)
BUN SERPL-MCNC: 20 MG/DL (ref 5–25)
CALCIUM SERPL-MCNC: 7.9 MG/DL (ref 8.4–10.2)
CHLORIDE SERPL-SCNC: 101 MMOL/L (ref 96–108)
CO2 SERPL-SCNC: 27 MMOL/L (ref 21–32)
CREAT SERPL-MCNC: 0.94 MG/DL (ref 0.6–1.3)
EOSINOPHIL # BLD AUTO: 0.19 THOUSAND/ÂΜL (ref 0–0.61)
EOSINOPHIL NFR BLD AUTO: 2 % (ref 0–6)
ERYTHROCYTE [DISTWIDTH] IN BLOOD BY AUTOMATED COUNT: 12.4 % (ref 11.6–15.1)
GFR SERPL CREATININE-BSD FRML MDRD: 81 ML/MIN/1.73SQ M
GLUCOSE SERPL-MCNC: 105 MG/DL (ref 65–140)
GLUCOSE SERPL-MCNC: 107 MG/DL (ref 65–140)
GLUCOSE SERPL-MCNC: 110 MG/DL (ref 65–140)
GLUCOSE SERPL-MCNC: 111 MG/DL (ref 65–140)
HCT VFR BLD AUTO: 32.7 % (ref 36.5–49.3)
HGB BLD-MCNC: 11.5 G/DL (ref 12–17)
IMM GRANULOCYTES # BLD AUTO: 0.07 THOUSAND/UL (ref 0–0.2)
IMM GRANULOCYTES NFR BLD AUTO: 1 % (ref 0–2)
INR PPP: 1.5 (ref 0.84–1.19)
LYMPHOCYTES # BLD AUTO: 1.55 THOUSANDS/ÂΜL (ref 0.6–4.47)
LYMPHOCYTES NFR BLD AUTO: 18 % (ref 14–44)
MAGNESIUM SERPL-MCNC: 1.8 MG/DL (ref 1.9–2.7)
MCH RBC QN AUTO: 31.7 PG (ref 26.8–34.3)
MCHC RBC AUTO-ENTMCNC: 35.2 G/DL (ref 31.4–37.4)
MCV RBC AUTO: 90 FL (ref 82–98)
MONOCYTES # BLD AUTO: 1.21 THOUSAND/ÂΜL (ref 0.17–1.22)
MONOCYTES NFR BLD AUTO: 14 % (ref 4–12)
NEUTROPHILS # BLD AUTO: 5.56 THOUSANDS/ÂΜL (ref 1.85–7.62)
NEUTS SEG NFR BLD AUTO: 64 % (ref 43–75)
NRBC BLD AUTO-RTO: 0 /100 WBCS
P AXIS: 47 DEGREES
PLATELET # BLD AUTO: 107 THOUSANDS/UL (ref 149–390)
PMV BLD AUTO: 10 FL (ref 8.9–12.7)
POTASSIUM SERPL-SCNC: 4.1 MMOL/L (ref 3.5–5.3)
PR INTERVAL: 202 MS
PROTHROMBIN TIME: 18.4 SECONDS (ref 11.6–14.5)
QRS AXIS: -34 DEGREES
QRSD INTERVAL: 72 MS
QT INTERVAL: 422 MS
QTC INTERVAL: 490 MS
RBC # BLD AUTO: 3.63 MILLION/UL (ref 3.88–5.62)
SODIUM SERPL-SCNC: 133 MMOL/L (ref 135–147)
T WAVE AXIS: 68 DEGREES
VENTRICULAR RATE: 81 BPM
WBC # BLD AUTO: 8.64 THOUSAND/UL (ref 4.31–10.16)

## 2023-09-24 PROCEDURE — 82948 REAGENT STRIP/BLOOD GLUCOSE: CPT

## 2023-09-24 PROCEDURE — 93010 ELECTROCARDIOGRAM REPORT: CPT | Performed by: INTERNAL MEDICINE

## 2023-09-24 PROCEDURE — 99024 POSTOP FOLLOW-UP VISIT: CPT | Performed by: PHYSICIAN ASSISTANT

## 2023-09-24 PROCEDURE — 80048 BASIC METABOLIC PNL TOTAL CA: CPT | Performed by: PHYSICIAN ASSISTANT

## 2023-09-24 PROCEDURE — 85025 COMPLETE CBC W/AUTO DIFF WBC: CPT | Performed by: PHYSICIAN ASSISTANT

## 2023-09-24 PROCEDURE — 83735 ASSAY OF MAGNESIUM: CPT | Performed by: PHYSICIAN ASSISTANT

## 2023-09-24 PROCEDURE — 85610 PROTHROMBIN TIME: CPT | Performed by: PHYSICIAN ASSISTANT

## 2023-09-24 PROCEDURE — 99024 POSTOP FOLLOW-UP VISIT: CPT | Performed by: STUDENT IN AN ORGANIZED HEALTH CARE EDUCATION/TRAINING PROGRAM

## 2023-09-24 RX ORDER — ACETAMINOPHEN 325 MG/1
650 TABLET ORAL EVERY 8 HOURS
Qty: 180 TABLET | Refills: 0 | Status: SHIPPED | OUTPATIENT
Start: 2023-09-24 | End: 2023-10-24

## 2023-09-24 RX ORDER — DOCUSATE SODIUM 100 MG/1
100 CAPSULE, LIQUID FILLED ORAL 2 TIMES DAILY
Qty: 60 CAPSULE | Refills: 0 | Status: SHIPPED | OUTPATIENT
Start: 2023-09-24 | End: 2023-10-24

## 2023-09-24 RX ORDER — ATORVASTATIN CALCIUM 80 MG/1
80 TABLET, FILM COATED ORAL
Qty: 30 TABLET | Refills: 2 | Status: SHIPPED | OUTPATIENT
Start: 2023-09-24 | End: 2023-12-23

## 2023-09-24 RX ORDER — WARFARIN SODIUM 2.5 MG/1
TABLET ORAL
Qty: 30 TABLET | Refills: 0 | Status: SHIPPED | OUTPATIENT
Start: 2023-09-24

## 2023-09-24 RX ORDER — AMIODARONE HYDROCHLORIDE 200 MG/1
TABLET ORAL
Qty: 50 TABLET | Refills: 0 | Status: SHIPPED | OUTPATIENT
Start: 2023-09-24 | End: 2023-11-03

## 2023-09-24 RX ORDER — POTASSIUM CHLORIDE 20 MEQ/1
20 TABLET, EXTENDED RELEASE ORAL DAILY
Status: DISCONTINUED | OUTPATIENT
Start: 2023-09-24 | End: 2023-09-24 | Stop reason: HOSPADM

## 2023-09-24 RX ORDER — TORSEMIDE 20 MG/1
40 TABLET ORAL DAILY
Status: DISCONTINUED | OUTPATIENT
Start: 2023-09-24 | End: 2023-09-24 | Stop reason: HOSPADM

## 2023-09-24 RX ORDER — TAMSULOSIN HYDROCHLORIDE 0.4 MG/1
0.8 CAPSULE ORAL
Qty: 60 CAPSULE | Refills: 2 | Status: SHIPPED | OUTPATIENT
Start: 2023-09-24 | End: 2023-12-23

## 2023-09-24 RX ORDER — ASPIRIN 325 MG
325 TABLET ORAL DAILY
Status: DISCONTINUED | OUTPATIENT
Start: 2023-09-24 | End: 2023-09-24 | Stop reason: HOSPADM

## 2023-09-24 RX ORDER — POTASSIUM CHLORIDE 20 MEQ/1
20 TABLET, EXTENDED RELEASE ORAL DAILY
Qty: 10 TABLET | Refills: 0 | Status: SHIPPED | OUTPATIENT
Start: 2023-09-25 | End: 2023-10-06 | Stop reason: SDUPTHER

## 2023-09-24 RX ORDER — OXYCODONE HYDROCHLORIDE 5 MG/1
2.5 TABLET ORAL EVERY 4 HOURS PRN
Qty: 30 TABLET | Refills: 0 | Status: SHIPPED | OUTPATIENT
Start: 2023-09-24 | End: 2023-10-04

## 2023-09-24 RX ORDER — TORSEMIDE 20 MG/1
20 TABLET ORAL DAILY
Qty: 10 TABLET | Refills: 0 | Status: SHIPPED | OUTPATIENT
Start: 2023-09-25 | End: 2023-10-06 | Stop reason: SDUPTHER

## 2023-09-24 RX ORDER — PANTOPRAZOLE SODIUM 40 MG/1
40 TABLET, DELAYED RELEASE ORAL
Qty: 30 TABLET | Refills: 0 | Status: SHIPPED | OUTPATIENT
Start: 2023-09-25 | End: 2023-10-25

## 2023-09-24 RX ORDER — LANOLIN ALCOHOL/MO/W.PET/CERES
800 CREAM (GRAM) TOPICAL ONCE
Status: COMPLETED | OUTPATIENT
Start: 2023-09-24 | End: 2023-09-24

## 2023-09-24 RX ADMIN — TORSEMIDE 40 MG: 20 TABLET ORAL at 08:26

## 2023-09-24 RX ADMIN — METOPROLOL TARTRATE 25 MG: 25 TABLET, FILM COATED ORAL at 08:26

## 2023-09-24 RX ADMIN — DOCUSATE SODIUM 100 MG: 100 CAPSULE, LIQUID FILLED ORAL at 08:26

## 2023-09-24 RX ADMIN — PANTOPRAZOLE SODIUM 40 MG: 40 TABLET, DELAYED RELEASE ORAL at 06:15

## 2023-09-24 RX ADMIN — ASPIRIN 325 MG ORAL TABLET 325 MG: 325 PILL ORAL at 08:26

## 2023-09-24 RX ADMIN — FONDAPARINUX SODIUM 2.5 MG: 2.5 INJECTION, SOLUTION SUBCUTANEOUS at 08:26

## 2023-09-24 RX ADMIN — POLYETHYLENE GLYCOL 3350 17 G: 17 POWDER, FOR SOLUTION ORAL at 08:26

## 2023-09-24 RX ADMIN — AMIODARONE HYDROCHLORIDE 200 MG: 200 TABLET ORAL at 06:15

## 2023-09-24 RX ADMIN — MAGNESIUM OXIDE TAB 400 MG (241.3 MG ELEMENTAL MG) 800 MG: 400 (241.3 MG) TAB at 08:26

## 2023-09-24 RX ADMIN — AMIODARONE HYDROCHLORIDE 0.5 MG/MIN: 50 INJECTION, SOLUTION INTRAVENOUS at 06:12

## 2023-09-24 RX ADMIN — ACETAMINOPHEN 975 MG: 325 TABLET, FILM COATED ORAL at 06:15

## 2023-09-24 RX ADMIN — POTASSIUM CHLORIDE 20 MEQ: 1500 TABLET, EXTENDED RELEASE ORAL at 08:26

## 2023-09-24 NOTE — PLAN OF CARE
Problem: INFECTION - ADULT  Goal: Absence or prevention of progression during hospitalization  Description: INTERVENTIONS:  - Assess and monitor for signs and symptoms of infection  - Monitor lab/diagnostic results  - Monitor all insertion sites, i.e. indwelling lines, tubes, and drains  - Monitor endotracheal if appropriate and nasal secretions for changes in amount and color  - [place] appropriate cooling/warming therapies per order  - Administer medications as ordered  - Instruct and encourage patient and family to use good hand hygiene technique  - Identify and instruct in appropriate isolation precautions for identified infection/condition  Outcome: Progressing     Problem: CARDIOVASCULAR - ADULT  Goal: Maintains optimal cardiac output and hemodynamic stability  Description: INTERVENTIONS:  - Monitor I/O, vital signs and rhythm  - Monitor for S/S and trends of decreased cardiac output  - Administer and titrate ordered vasoactive medications to optimize hemodynamic stability  - Assess quality of pulses, skin color and temperature  - Assess for signs of decreased coronary artery perfusion  - Instruct patient to report change in severity of symptoms  Outcome: Progressing

## 2023-09-24 NOTE — DISCHARGE SUMMARY
Discharge Summary - Cardiothoracic Surgery   Eveline Fong 71 y.o. male MRN: [id number]  Unit/Bed#: [facility] 406-01 Encounter: [id number]    Admission Date: 9/20/2023     Discharge Date: 09/24/23    Admitting Diagnosis: Coronary artery disease involving native coronary artery of native heart without angina pectoris [I25.10]    Primary Discharge Diagnosis:   Coronary artery disease. S/P coronary artery bypass grafting;    Secondary Discharge Diagnosis:   CAD/MI x2 (PCI to LAD (4/2000), to D1 (1/2007), RCA 4/2010), squamous cell skin cancer, nephrotic syndrome, lumbar stenosis, HLD, IFG, seborrheic keratosis, ERIC, HTN     Attending: Aniyah Naylor M.D. Consulting Physician(s):   Cardiology  Medical critical care    Procedures Performed:   Procedure(s):  CORONARY ARTERY BYPASS GRAFT (CABG) X2 VESSELS, LIMA - LAD,  LEFT LEG EVH - PDA , W/ CALOS     Hospital Course: The patient was seen in consultation prior to this admission for evaluation of Coronary artery disease. Risks and benefits of coronary artery bypass grafting were discussed in detail, and patient was agreeable. Routine preoperative evaluation was completed and informed consent was obtained prior to admission. 9/20: Elective admission for CABG x 2. Intraoperative blood products include: none. No significant postoperative bleeding. Transferred to ICU supported with Epi 2mcg. Wean towards extubation. A paced at 80.      9/21: Extubated without incident. Remains supported on rajan 30. Brief run VT (resolved after lidocaine bolus). Cardiac infusions: Neosynephrine, 30 mcg/min; Wean as tolerated. D/C Galdino [place] catheter. Platelet 247; F/U AM CBC. Start Lopressor, 12.5mg PO BID. Start IV Lasix, 40 mg BID and Discontinue quarles catheter. Transfer from ICU to [facility] level of care today. PM: Rajan off since 1100. D/C gonzales and downgrade to telemetry status. Went into Afib at approx 1615.  Given Amio bolus and gtt.     9/22: Remains in rate controlled afib this AM at 80s. Only received 12.5 mg lopressor x 1 at 0830 yesterday. Increased lopressor 25 mg BID. Rebolus amiodarone, INR 1.2 - 5 mg coumadin. Pain controlled, denies SOB, using IS (1500), weaned to RA. Tolerating diet, denies nausea, +belching. Required straight cath x 1 overnight, start flomax. Net neg 1.36, increase lasix 60 mg BID. Last BM +9/19, increase bowel regimen. D/C CT/EPW.      9/23: Continues in rate controlled afib. Did not receive PM dose of BB due to parameters not met, adjust. INR 1.2, dose 5 mg of coumadin, replete K/Mg. Now passing flatus, No further straight cath required. Net neg 2.1L, remains positive from admission with 1+ LE edema, continue lasix. Plts 92k, decrease ASA to 81 mg.      9/24: Pain controlled, denies SOB, using IS (2500). Eager to eat. Last BM +9/23. Ambulating well. Converted to NSR at noon yesterday, complete amio gtt, taper at discharge of amio 200 mg BID x 10 days, then QD, dose coumadin. Net neg 1.7L, transition to demadex 40 mg QD then 20 mg lobo. X 10 days. Labs stable with improved thrombocytopenia, increase ASA to 325 mg QD for today with INR 1.5, decrease to 81 mg on discharge with coumadin. Replete Mg. Patient in good condition for discharge. Patient in agreement with plan and follow-up appointments. Condition at Discharge:   good     Discharge Physical Exam:    Please see the documented physical exam from this morning's progress note for details.     Discharge Data:  Results from last 7 days   Lab Units 09/24/23  0449 09/23/23  0424 09/22/23  0613   WBC Thousand/uL 8.64 10.34* 16.41*   HEMOGLOBIN g/dL 11.5* 12.3 13.5   HEMATOCRIT % 32.7* 35.6* 39.5   PLATELETS Thousands/uL 107* 92* 113*     Results from last 7 days   Lab Units 09/24/23  0449 09/23/23  0424 09/22/23  0510 09/20/23  1619 09/20/23  1221   POTASSIUM mmol/L 4.1 3.9 4.7   < >  --    CHLORIDE mmol/L 101 100 100   < >  --    CO2 mmol/L 27 27 23   < >  --    CO2, I-STAT mmol/L  --   --   --   --  23 BUN mg/dL 20 18 18   < >  --    CREATININE mg/dL 0.94 0.83 0.91   < >  --    GLUCOSE, ISTAT mg/dl  --   --   --   --  142*   CALCIUM mg/dL 7.9* 7.8* 7.9*   < >  --     < > = values in this interval not displayed. Results from last 7 days   Lab Units 09/24/23  0449 09/23/23  0424 09/22/23  1247   INR  1.50* 1.26* 1.22*       Discharge instructions/Information to patient and family:   See after visit summary for information provided to patient and family. Ramiro Guerrero was educated on restrictions regarding driving and lifting, and techniques of proper incisional care. They were specifically counselled on signs and symptoms of an incisional infection, and advised to contact our service immediately should they develop fevers, sweats, chill, redness or drainage at the site of any incisions. Provisions for Follow-Up Care:  See after visit summary for information related to follow-up care and any pertinent home health orders. Disposition:  See After Visit Summary for discharge disposition information. Planned Readmission:   No    Discharge Medications:  See after visit summary for reconciled discharge medications provided to patient and family. Ramiro Guerrero was provided contact information and scheduled a follow up appointment with Valdemar Holloway M.D. Additionally, follow up appointments have been scheduled for their primary care physician and primary cardiologist.  Contact information was provided. Ramiro Guerrero was counseled on the importance of avoiding tobacco products. As with all patients whom have undergone open heart surgery, tobacco cessation medication was contraindicated at the time of discharge.      ACE/ARB was Contraindicated secondary to hypotension    Beta Blocker was Prescribed at discharge    Aspirin was Prescribed at discharge    Statin was Prescribed at discharge      The patient was discharged on ongoing diuretic therapy with Torsemide, 20 mg, PO QD and Potassium Chloride 20 mEq, PO QD. They were advised to continue these medications for 10 days, unless otherwise directed. Narcotic pain medication was prescribed in the form of oxycodone. Prior to prescribing, their prescription profile was reviewed on the [facility] of health prescription drug monitoring program.    Natacha Cervantes  is being discharged on anticoagulation therapy for treatment of postop afib. As they are new to Coumadin therapy, arrangements have been made the [facility] to monitor INR (goal 2 to 3) and provide dosing instructions. Their office was notified by [facility] which itemized the patient’s daily INR’s and correlating Coumadin doses during their hospitalization. Natacha Cervantes has been prescribed Coumadin, 2.5 mg tabs, with 30 tablets being dispensed. They have been advised to take 2.5 mg daily, unless otherwise directed. Follow up PT/INR will be ordered at the discretion of the Coumadin clinic. The patient was informed that following their postoperative surgical evaluation, they will be referred to outpatient cardiac rehabilitation. They were counseled that this program is run by specialists who will help them safely strengthen their heart and prevent more heart disease. Cardiac rehabilitation will include exercise, relaxation, stress management, and heart-healthy nutrition. Caregivers will also check to make sure their medication regimen is working. During this admission, the patient was questioned on their use of tobacco, alcohol, and illicit/non-prescription drug use in the  previous 24 months. Natacha Cervantes states that they have not used any of these substances in this time frame. I spent 30 minutes discharging the patient. This time was spent on the day of discharge. I had direct contact with the patient on the day of discharge. Additional documentation is required if more than 30 minutes were spent on discharge.      SIGNATURE: Daron Buitrago Mihaela Olivera  DATE: September 24, 2023  TIME: 9:49 AM

## 2023-09-24 NOTE — PROGRESS NOTES
Progress Note - Cardiothoracic Surgery   Lorrie Ovalle 71 y.o. male MRN: [id number]  Unit/Bed#: [facility] 406-01 Encounter: [id number]    Coronary artery disease. S/P coronary artery bypass grafting; POD # 4      24 Hour Events: Converted to NSR at noon yesterday. Thrombocytopenia improved- 107k from 92k. Pain controlled, denies SOB, using IS (2500). Eager to eat. Last BM +9/23.      Medications:   Scheduled Meds:  Current Facility-Administered Medications   Medication Dose Route Frequency Provider Last Rate   • acetaminophen  975 mg Oral Q8H Etienne Cameron PA-C     • amiodarone (CORDARONE) 900 mg in dextrose 5 % 500 mL infusion  0.5 mg/min Intravenous Continuous [facility] 0.5 mg/min (09/24/23 0612)   • amiodarone  200 mg Oral Q8H [address] Etienne Cameron PA-C     • aspirin  81 mg Oral Daily Gerhard Kingston PA-C     • atorvastatin  80 mg Oral Daily With Silver Mira Spain PA-C     • bisacodyl  10 mg Rectal Daily PRN Gerhard Kingston PA-C     • docusate sodium  100 mg Oral BID Etienne Cameron PA-C     • fondaparinux  2.5 mg Subcutaneous Daily Etienne Cameron PA-C     • furosemide  60 mg Intravenous BID (diuretic) Gerhard Kingston PA-C     • insulin lispro  1-6 Units Subcutaneous TID AC Etienne Caemron PA-C     • insulin lispro  1-6 Units Subcutaneous HS Etienne Cameron PA-C     • metoprolol tartrate  25 mg Oral Q12H [address] Gerhard Kingston PA-C     • ondansetron  4 mg Intravenous Q6H PRN Etienne Cameron PA-C     • oxyCODONE  5 mg Oral Q4H PRN Etienne Cameron PA-C     • oxyCODONE  2.5 mg Oral Q4H PRN Etienne Cameron PA-C     • pantoprazole  40 mg Oral Early Morning Etienne Cameron PA-C     • polyethylene glycol  17 g Oral Daily Gerhard Kingston PA-C     • potassium chloride  20 mEq Oral BID Etienne Cameron PA-C     • senna  1 tablet Oral HS Gerhard Kingston PA-C     • tamsulosin  0.8 mg Oral Daily With FERNY Adame     • temazepam  15 mg Oral HS PRN Etienne Cameron PA-C       Continuous Infusions:amiodarone (CORDARONE) 900 mg in dextrose 5 % 500 mL infusion, 0.5 mg/min, Last Rate: 0.5 mg/min (09/24/23 0612)      PRN Meds:.•  bisacodyl  •  ondansetron  •  oxyCODONE  •  oxyCODONE  •  temazepam    Vitals:   Vitals:    09/23/23 2200 09/23/23 2226 09/24/23 0400 09/24/23 0600   BP: 112/68  93/59    BP Location: Left arm  Left arm    Pulse: 83  72    Resp: 18  20    Temp:  98.2 °F (36.8 °C) 98.3 °F (36.8 °C)    TempSrc:   Oral    SpO2: 96%  94%    Weight:    108 kg (237 lb 3.4 oz)   Height:       afebrile    Telemetry: NSR- 72    Respiratory:   SpO2: SpO2: 94 %; Room Air    Intake/Output:     Intake/Output Summary (Last 24 hours) at 9/24/2023 0647  Last data filed at 9/24/2023 0612  Gross per 24 hour   Intake 1360.94 ml   Output 3125 ml   Net -1764.06 ml   +1 unmeasured, now neg. Since admit    Weights:   Weight (last 2 days)     Date/Time Weight    09/24/23 0600 108 (237.22)    09/23/23 0600 99.9 (220.24)    09/22/23 0600 102 (224.43)      admit weight: 103 kg (todays weight is inaccurate)      Results:   Results from last 7 days   Lab Units 09/24/23 0449 09/23/23  0424 09/22/23  0613   WBC Thousand/uL 8.64 10.34* 16.41*   HEMOGLOBIN g/dL 11.5* 12.3 13.5   HEMATOCRIT % 32.7* 35.6* 39.5   PLATELETS Thousands/uL 107* 92* 113*     Results from last 7 days   Lab Units 09/24/23  0449 09/23/23  0424 09/22/23  0510 09/20/23  1619 09/20/23  1221   SODIUM mmol/L 133* 132* 131*   < >  --    POTASSIUM mmol/L 4.1 3.9 4.7   < >  --    CHLORIDE mmol/L 101 100 100   < >  --    CO2 mmol/L 27 27 23   < >  --    CO2, I-STAT mmol/L  --   --   --   --  23   BUN mg/dL 20 18 18   < >  --    CREATININE mg/dL 0.94 0.83 0.91   < >  --    GLUCOSE, ISTAT mg/dl  --   --   --   --  142*   CALCIUM mg/dL 7.9* 7.8* 7.9*   < >  --     < > = values in this interval not displayed.      Mg- 1.8  1.8 (9/23)     Results from last 7 days   Lab Units 09/24/23  0449 09/23/23  0424 09/22/23  1247   INR  1.50* 1.26* 1.22*     Coumadin:   9/23- 5 mg  9/22- 5 mg    Point of care glucose: 87 - 165 (no hx of DM)    Studies:  No new studies    I have personally reviewed pertinent films in PACS    Invasive Lines/Tubes:  Invasive Devices     Central Venous Catheter Line  Duration           CVC Central Lines 09/20/23 Triple Right Internal jugular 3 days          Peripheral Intravenous Line  Duration           Peripheral IV 09/20/23 Left Antecubital 3 days              Physical Exam:    HEENT/NECK:  Normocephalic. Atraumatic. No jugular venous distention. Cardiac: Regular rate and rhythm and No murmurs/rubs/gallops  Pulmonary:  Breath sounds clear bilaterally and No rales/rhonchi/wheezes  Abdomen:  Non-tender, Non-distended and Normal bowel sounds  Incisions: Sternum is stable. Incision is clean, dry, and intact. and Saphenectomy incison is clean, dry, and intact. Extremities: Extremities warm/dry and Trace edema B/L  Neuro: Alert and oriented X 3 and No focal deficits  Skin: Warm/Dry, without rashes or lesions. Assessment:  Principal Problem:    Coronary artery disease involving native coronary artery of native heart without angina pectoris  Active Problems:    Hypertension, essential    Combined hyperlipidemia    Impaired fasting glucose    ERIC (obstructive sleep apnea)    S/P CABG x 2       Coronary artery disease. S/P coronary artery bypass grafting; POD # 4    Plan:    1. Cardiac:     NSR; HR well-controlled. BP well-controlled  Continue Lopressor, 25mg PO BID   Continue prophylactic Amiodarone, 200 mg PO TID    Anticoagulated for afib (post op)- resolved   INR 1.5, dose coumadin   Complete 24hrs of amio gtt (d/c at noon)   Replete Mg    Increase ASA to 325 mg w/ improved thrombocytopenia  Continue Statin therapy  Epicardial pacing wires have been removed  Maintain central IV access today for medications requiring central IV access  Continue DVT prophylaxis    2.  Pulmonary:   Good Room air oxygen saturation; Continue incentive spirometry/Coughing/Deep breathing exercises  Chest tubes have been discontinued    3. Renal:   Post op Creatinine stable; Follow up labs prn   Intake/Output net: -1.76 L/24 hours (since admit +600mL)  Diuretic Regimen:  Transition to IV Lasix, 60 mg BID to Demadex 40 mg today smaller dose on discharge  Continue Potassium Chloride 20 mEq PO BID  Continue Flomax and urinary retention protocol    4. Neuro:  Neurologically intact; No active issues  Incisional pain well-controlled  Continue Tylenol, 975 mg PO q 8, standing dose  Continue Oxycodone, 2.5 to 5 mg PO q 4 hours prn pain    5. GI:  Cardiac diet, with 1800 mL fluid restriction  Continue stool softeners and prn suppository  Continue GI prophylaxis  Continue bowel regimen    6. Endo:   Glucose well-controlled with sliding scale coverage    7    Hematology:    Post-operative blood count acceptable; Trend prn  Thrombocytopenia- improved.     8.   Disposition:      Following daily PT/OT recommendations- no needs  Anticipate discharge 9/24      VTE Pharmacologic Prophylaxis: Fondaparinux (Arixtra)  VTE Mechanical Prophylaxis: sequential compression device    Collaborative rounds completed with supervising physician  Plan of care discussed with bedside nurse    SIGNATURE: Lucero Loera PA-C  DATE: September 24, 2023  TIME: 6:47 AM

## 2023-09-25 ENCOUNTER — TRANSITIONAL CARE MANAGEMENT (OUTPATIENT)
Age: 73
End: 2023-09-25

## 2023-09-25 ENCOUNTER — TELEPHONE (OUTPATIENT)
Dept: CARDIOLOGY CLINIC | Facility: CLINIC | Age: 73
End: 2023-09-25

## 2023-09-25 ENCOUNTER — ANTICOAG VISIT (OUTPATIENT)
Dept: CARDIOLOGY CLINIC | Facility: CLINIC | Age: 73
End: 2023-09-25

## 2023-09-25 NOTE — TELEPHONE ENCOUNTER
----- Message from Irais Agent sent at 9/25/2023  8:56 AM EDT -----    ----- Message -----  From: Jeffry Arndt PA-C  Sent: 9/24/2023   9:48 AM EDT  To: BAR Kohler; Festus Oleary PA-C; #    Luwana Aayush is being discharged on anticoagulation therapy for treatment of postop afib. I have prescribed Coumadin, 2.5 mg tabs, with 30 tablets being dispensed. They have been advised to take 2.5 mg daily, unless otherwise directed. Primary Cardiologist: Eve    Expected duration of Coumadin: 1- 3 months    Target INR: 2 to 3    Interacting medications: ASA                                        Hospital Anticoagulation Course:    Date INR Warfarin dose (mg)  9/24 1.5 2.5  9/23 1.26 5  9/22 1.22 5      If you have any questions, you can reach me at [phone].      Jeffry Arndt PA-C  09/24/23  9:45 AM

## 2023-09-25 NOTE — PROGRESS NOTES
----- Message -----   From: Martha Raya PA-C   Sent: 9/24/2023   9:48 AM EDT   To: BAR Mittal; Donna Whitlock PA-C; *     Sam Carreon is being discharged on anticoagulation therapy for treatment of postop afib.  I have prescribed Coumadin, 2.5 mg tabs, with 30 tablets being dispensed. Yohana Mckenna have been advised to take 2.5 mg daily, unless otherwise directed.       Primary Cardiologist: Eve     Expected duration of Coumadin: 1- 3 months     Target INR: 2 to 3     Interacting medications: [facility] Anticoagulation Course:     Date INR Warfarin dose (mg)   9/24 1.5 2.5   9/23 1.26 5   9/22 1.22 5       If you have any questions, you can reach me at [phone].      Martha Raya PA-C   09/24/23   9:45 AM

## 2023-09-29 ENCOUNTER — OFFICE VISIT (OUTPATIENT)
Age: 73
End: 2023-09-29
Payer: MEDICARE

## 2023-09-29 VITALS
WEIGHT: 236.6 LBS | RESPIRATION RATE: 16 BRPM | OXYGEN SATURATION: 96 % | BODY MASS INDEX: 35.04 KG/M2 | SYSTOLIC BLOOD PRESSURE: 116 MMHG | DIASTOLIC BLOOD PRESSURE: 78 MMHG | HEIGHT: 69 IN | HEART RATE: 62 BPM

## 2023-09-29 DIAGNOSIS — Z79.01 ANTICOAGULATED: Primary | ICD-10-CM

## 2023-09-29 DIAGNOSIS — Z12.11 ENCOUNTER FOR SCREENING FOR MALIGNANT NEOPLASM OF COLON: ICD-10-CM

## 2023-09-29 DIAGNOSIS — I10 HYPERTENSION, ESSENTIAL: ICD-10-CM

## 2023-09-29 DIAGNOSIS — D69.6 PLATELETS DECREASED (HCC): ICD-10-CM

## 2023-09-29 DIAGNOSIS — E78.2 COMBINED HYPERLIPIDEMIA: ICD-10-CM

## 2023-09-29 DIAGNOSIS — Z79.01 ANTICOAGULATION MONITORING, INR RANGE 2-3: Primary | ICD-10-CM

## 2023-09-29 DIAGNOSIS — I25.10 CORONARY ARTERY DISEASE INVOLVING NATIVE CORONARY ARTERY OF NATIVE HEART WITHOUT ANGINA PECTORIS: Primary | ICD-10-CM

## 2023-09-29 PROCEDURE — 99496 TRANSJ CARE MGMT HIGH F2F 7D: CPT

## 2023-09-29 NOTE — PROGRESS NOTES
INTERNAL MEDICINE TRANSITION OF CARE OFFICE VISIT  [facility] INTERNAL MEDICINE [address]    NAME: Tri Valladares  AGE: 71 y.o. SEX: male  : 1950     DATE: 2023     Assessment and Plan:     Coronary artery disease involving native coronary artery of native heart without angina pectoris  Recent CABG x2  (ZENG, LAD) on . Does have a history of an MI and a PCI to LAD in , 2 D1 , RCA in . sleeping in recliner, walking, resting as needed. Did discuss use of incentive spirometer greater than 10 times every hour to decrease risk of pleural effusion postop. Amiodarone use due to postop A-fib after prescribed 30-day dose discontinue. Using Tylenol only for pain at this time. No more than 3 g in 24 hours  Warfarin use due to postop A-fib currently on 2.5 mg daily. As per Coumadin clinic was supposed to get his labs yesterday however did not send patient over to get them today and did report this to the Coumadin clinic. Discussed diet low in vitamin K due to use of Coumadin. Patient's wife is also on Coumadin and understands. Goal therapeutic ranges of INR 2-3. Daily weights at home report a 2-3 ln weight gain in 24 hours or 5-7 lb gain in 7 days  Currently on torsemide and potassium, will discontinue when therapy is copmpleted    Combined hyperlipidemia  Tolerating a statin up to atorvastatin 80 MG, did not tolerate in the past we will continue to monitor this  Continues on aspirin 81 mg as well. Limit carbohydrates such as bread, rice, pasta as well as sweets in your diet    Hypertension, essential  Taking metoprolol 25 milligrams daily. Work with physical therapy to understand heart rate goals during exercise due to use of beta-blocker which can limit arise in heart rate during exercise. Limit salt to no more than 2000 mg/day. Continue to check your blood pressure at home and record for your cardiologist visits.     Platelets   Baseline low 100s    Decreased urination post op  Discussed that this is most likely due to anesthesia which can cause. Will revisit use of tamsulosin about 4 weeks to 6 weeks post anesthesia    Encounter for screening for malignant neoplasm of colon  GI referral for colon screening       No follow-ups on file. Transitional Care Management Review:     Esme Munoz is a 71 y.o. male here for TCM follow-up    During the TCM phone call patient stated:    TCM Call     Date and time call was made  9/25/2023  9:21 AM    Hospital care reviewed  Records reviewed    Patient was hospitialized at  [address] [place] [address]    Date of Admission  09/20/23    Date of discharge  09/24/23    Diagnosis  Coronary artery disease involving native coronary artery of native heart without angina pectoris    Disposition  Home      TCM Call     Post hospital issues  None    Scheduled for follow up? Yes    Patients specialists  Cardiologist    I have advised the patient to call PCP with any new or worsening symptoms  sarah hobbs cma    Living Arrangements  Spouse or Significiant other    Support System  Spouse    Are you recieving any outpatient services  No    Are you recieving home care services  Yes    Types of home care services  Home health aid           HPI:     Patient is here today for a TCM post open heart surgery on 9/20. He is accompanied by his wife today. He is feeling well. He follows with home health services currently and has been getting physical therapy. He is taking all of his medications which we reviewed together. He is weighing himself daily as well as taking blood pressure readings daily and brings in his book for review today of trends.     The following portions of the patient's history were reviewed and updated as appropriate: allergies, current medications, past family history, past medical history, past social history, past surgical history and problem list.     Review of Systems:     Review of Systems     Problem List: Patient Active Problem List   Diagnosis   • Coronary artery disease involving native coronary artery of native heart without angina pectoris   • Hypertension, essential   • Combined hyperlipidemia   • Impaired fasting glucose   • Seborrheic keratosis   • Screening for skin condition   • History of skin cancer   • ERIC (obstructive sleep apnea)   • S/P CABG x 2   • Platelets decreased (HCC)        Objective:     /78 (BP Location: Left arm, Patient Position: Sitting, Cuff Size: Large)   Pulse 62   Resp 16   Ht 5' 9" (1.753 m)   Wt 107 kg (236 lb 9.6 oz)   SpO2 96%   BMI 34.94 kg/m²     Physical Exam  Constitutional:       Appearance: He is well-developed. HENT:      Head: Normocephalic and atraumatic. Eyes:      Conjunctiva/sclera: Conjunctivae normal.      Pupils: Pupils are equal, round, and reactive to light. Cardiovascular:      Rate and Rhythm: Normal rate and regular rhythm. Heart sounds: Normal heart sounds. Pulmonary:      Effort: Pulmonary effort is normal.      Breath sounds: Normal breath sounds. Abdominal:      General: Bowel sounds are normal.      Palpations: Abdomen is soft. Musculoskeletal:         General: Normal range of motion. Cervical back: Normal range of motion. Skin:     General: Skin is warm and dry. Findings: Ecchymosis present. Neurological:      Mental Status: He is alert and oriented to person, place, and time. Laboratory Results: I have personally reviewed the pertinent laboratory results/reports     Radiology/Other Diagnostic Testing Results: I have personally reviewed pertinent reports. XR chest portable    Result Date: 9/21/2023  CHEST INDICATION:   Post Open Heart Surgey. COMPARISON: 9/20/2023 EXAM PERFORMED/VIEWS:  XR CHEST PORTABLE FINDINGS: The right internal jugular central venous catheter and left chest tube remain in place with other lines and tubes seen previously removed.  Cardiomediastinal silhouette appears unremarkable. The lungs are clear. No pneumothorax or pleural effusion. Osseous structures appear within normal limits for patient age. No acute cardiopulmonary disease. Workstation performed: [id number]     CALOS intraop interventional w/realtime guidance of cardiac procedures    Result Date: 9/21/2023  This order contains the linked images for the CALOS that was performed by the Anesthesiologist.  Please see the  CARDIAC CALOS ANESTHESIA procedure for results. XR chest portable ICU    Result Date: 9/20/2023  CHEST INDICATION:   S/P open heart. COMPARISON: Compared with 9/8/2023 EXAM PERFORMED/VIEWS:  XR CHEST PORTABLE ICU FINDINGS: Sternotomy wires. Endotracheal tube above the layne. Pericardial catheters. Right neck catheter with tip in the cavoatrial region. Right neck [place] catheter tip in the main pulmonary artery. Left chest tube tip in the mid lung region. Cardiomediastinal silhouette appears unremarkable. Diffuse prominent vascular markings. The lungs are clear. No pneumothorax or pleural effusion. Osseous structures appear within normal limits for patient age. Lines and tubes as described. Mild congestive  changes. Workstation performed: [place] bedside procedure    Result Date: 9/20/2023  [id number]. [id number]    CALOS Anesthesia    Result Date: 9/20/2023  Alexa Hughes MD     9/20/2023 11:40 AM Procedure Performed: CALOS Anesthesia Start Time:  9/20/2023 8:50 AM Preanesthesia Checklist Patient identified, IV assessed, risks and benefits discussed, monitors and equipment assessed, procedure being performed at surgeon's request and anesthesia consent obtained. Procedure Diagnostic Indications for CALOS:  hemodynamic monitoring. Type of CALOS: complete CALOS with interpretation. Images Saved: ultrasound permanent image saved. Physician Requesting Echo: Aixa Allen MD.  Location performed: OR. Intubated. Bite block not placed. Heart visualized.  Insertion of CALOS Probe:  Atraumatic. Probe Type:  Epiaortic and multiplane. Modalities:  3D, color flow mapping, continuous wave Doppler and pulse wave Doppler. Echocardiographic and Doppler Measurements PREPROCEDURE LEFT VENTRICLE: Systolic Function: mildly impaired. Ejection Fraction: 45%. Regional Wall Motion Abnormalities: mid ant-sept, sept, infsept severe HK (myocardial thinned, aneurysmal appearing). RIGHT VENTRICLE: Systolic Function: normal.  Cavity size normal.  AORTIC VALVE: Leaflets: normal and trileaflet. Leaflet motions normal and normal. Stenosis: none. Regurgitation: none. MITRAL VALVE: Leaflets: normal. Leaflet Motions: prolapse. Regurgitation: mild. Stenosis: none. TRICUSPID VALVE: Leaflets: normal.   Regurgitation: trace. ASCENDING AORTA:   Dissection not present. AORTIC ARCH:   dissection not present. Grade 2: severe intimal thickening without protruding atheroma. DESCENDING AORTA:   Dissection not present. Grade 2: severe intimal thickening without protruding atheroma. LEFT ATRIAL APPENDAGE:   Emptying Velocity: 4 cm/sec. OTHER ATRIAL FINDINGS: No JOEL clot. ATRIAL SEPTUM: Intra-atrial septal morphology: no PFO by CFD. EPIAORTIC: Plaque Thickness: 0-5 mm. POSTPROCEDURE LEFT VENTRICLE: Unchanged . RIGHT VENTRICLE: Unchanged . AORTIC VALVE: Unchanged . MITRAL VALVE: Unchanged . TRICUSPID VALVE: Unchanged . AORTA: Unchanged . Dissection: Dissection not present. REMOVAL: Probe Removal: atraumatic. Current Medications:     Outpatient Medications Prior to Visit   Medication Sig Dispense Refill   • acetaminophen (TYLENOL) 325 mg tablet Take 2 tablets (650 mg total) by mouth every 8 (eight) hours 180 tablet 0   • amiodarone 200 mg tablet Take 1 tablet (200 mg total) by mouth 2 (two) times a day for 10 days, THEN 1 tablet (200 mg total) daily.  50 tablet 0   • aspirin 81 MG tablet Take 1 tablet by mouth daily     • atorvastatin (LIPITOR) 80 mg tablet Take 1 tablet (80 mg total) by mouth daily with dinner 30 tablet 2   • docusate sodium (COLACE) 100 mg capsule Take 1 capsule (100 mg total) by mouth 2 (two) times a day 60 capsule 0   • metoprolol tartrate (LOPRESSOR) 25 mg tablet Take 1 tablet (25 mg total) by mouth every 12 (twelve) hours 60 tablet 2   • oxyCODONE (ROXICODONE) 5 immediate release tablet Take 0.5 tablets (2.5 mg total) by mouth every 4 (four) hours as needed for severe pain for up to 10 days Max Daily Amount: 15 mg 30 tablet 0   • pantoprazole (PROTONIX) 40 mg tablet Take 1 tablet (40 mg total) by mouth daily in the early morning Do not start before September 25, 2023. 30 tablet 0   • potassium chloride (K-DUR,KLOR-CON) 20 mEq tablet Take 1 tablet (20 mEq total) by mouth daily for 10 days Do not start before September 25, 2023. 10 tablet 0   • tamsulosin (FLOMAX) 0.4 mg Take 2 capsules (0.8 mg total) by mouth daily with dinner 60 capsule 2   • torsemide (DEMADEX) 20 mg tablet Take 1 tablet (20 mg total) by mouth daily for 10 days Do not start before September 25, 2023. 10 tablet 0   • warfarin (Coumadin) 2.5 mg tablet Please take 2.5 mg (1 tablet) by mouth unless otherwise directed. 30 tablet 0     No facility-administered medications prior to visit.        Ronnell Marquez, [address] [facility] INTER[address] MEDICINE [address]

## 2023-10-02 ENCOUNTER — APPOINTMENT (OUTPATIENT)
Dept: LAB | Facility: CLINIC | Age: 73
End: 2023-10-02
Payer: MEDICARE

## 2023-10-02 ENCOUNTER — TELEPHONE (OUTPATIENT)
Dept: CARDIAC SURGERY | Facility: CLINIC | Age: 73
End: 2023-10-02

## 2023-10-02 LAB
INR PPP: 1.42 (ref 0.84–1.19)
PROTHROMBIN TIME: 17.2 SECONDS (ref 11.6–14.5)

## 2023-10-02 PROCEDURE — 85610 PROTHROMBIN TIME: CPT

## 2023-10-02 PROCEDURE — 36415 COLL VENOUS BLD VENIPUNCTURE: CPT

## 2023-10-02 NOTE — TELEPHONE ENCOUNTER
POST OP CALL    9/20/23: Elective CABG x 2  9/24/23: Discharge home  9/29/23: PCP appt  10/2/23: [employer] to patient who reports he is doing well. He is walking and tolerating light activity. He denies chest pian, SOB, lightheadedness or palpitations. Weight is stable at 230 lb (Pre op 228 lb). He has one more day of Torsemide and Potassium Chloride to complete a 10 day course. Incisions healing well. Mild left ankle edema (SV havrvest leg). Eating well, no GI disturbances. He reports some fatigue but it is improving. Answered questions. Reviewed meds and appts.

## 2023-10-03 ENCOUNTER — ANTICOAG VISIT (OUTPATIENT)
Dept: CARDIOLOGY CLINIC | Facility: CLINIC | Age: 73
End: 2023-10-03

## 2023-10-06 DIAGNOSIS — I48.0 PAROXYSMAL A-FIB (HCC): ICD-10-CM

## 2023-10-06 DIAGNOSIS — I25.10 CORONARY ARTERY DISEASE INVOLVING NATIVE CORONARY ARTERY OF NATIVE HEART WITHOUT ANGINA PECTORIS: ICD-10-CM

## 2023-10-06 DIAGNOSIS — Z95.1 S/P CABG X 2: ICD-10-CM

## 2023-10-06 RX ORDER — POTASSIUM CHLORIDE 20 MEQ/1
20 TABLET, EXTENDED RELEASE ORAL DAILY
Qty: 5 TABLET | Refills: 0 | Status: SHIPPED | OUTPATIENT
Start: 2023-10-06 | End: 2023-10-11

## 2023-10-06 RX ORDER — TORSEMIDE 20 MG/1
20 TABLET ORAL DAILY
Qty: 5 TABLET | Refills: 0 | Status: SHIPPED | OUTPATIENT
Start: 2023-10-06 | End: 2023-10-11

## 2023-10-06 NOTE — PROGRESS NOTES
Received call from visiting nurse Candy Schulz who is with patient today, and reports that he has been short of breath since last night. He also had a 4 lb weight gain; yesterday he was 230 lbs. This morning he was 234 lbs. There is no significant lower extremity swelling, but Candy Spencerdallas does report that his abdomen is distended and firm. He is not short of breath at rest, but when he is more active, he is experiencing dyspnea. On exam, Candy Zeus also noted slight crackles in bilateral bases. His last dose of torsemide and potassium were on Wednesday 10/4. We will renew torsemide and potassium for 5 additional days, and will follow up next week to see how he is doing. He does not have a cardiology appointment until 10/19, and he follows up with us on 10/20.

## 2023-10-09 ENCOUNTER — APPOINTMENT (OUTPATIENT)
Dept: LAB | Facility: HOSPITAL | Age: 73
End: 2023-10-09
Attending: INTERNAL MEDICINE
Payer: MEDICARE

## 2023-10-09 ENCOUNTER — ANTICOAG VISIT (OUTPATIENT)
Dept: CARDIOLOGY CLINIC | Facility: CLINIC | Age: 73
End: 2023-10-09

## 2023-10-09 DIAGNOSIS — Z95.1 S/P CABG X 2: Primary | ICD-10-CM

## 2023-10-09 DIAGNOSIS — Z95.1 S/P CABG X 2: ICD-10-CM

## 2023-10-09 DIAGNOSIS — Z79.01 ANTICOAGULATION MONITORING, INR RANGE 2-3: ICD-10-CM

## 2023-10-09 LAB
ANION GAP SERPL CALCULATED.3IONS-SCNC: 6 MMOL/L
BUN SERPL-MCNC: 21 MG/DL (ref 5–25)
CALCIUM SERPL-MCNC: 9.2 MG/DL (ref 8.4–10.2)
CHLORIDE SERPL-SCNC: 100 MMOL/L (ref 96–108)
CO2 SERPL-SCNC: 31 MMOL/L (ref 21–32)
CREAT SERPL-MCNC: 1.15 MG/DL (ref 0.6–1.3)
GFR SERPL CREATININE-BSD FRML MDRD: 63 ML/MIN/1.73SQ M
GLUCOSE SERPL-MCNC: 112 MG/DL (ref 65–140)
INR PPP: 1.46 (ref 0.84–1.19)
POTASSIUM SERPL-SCNC: 4.9 MMOL/L (ref 3.5–5.3)
PROTHROMBIN TIME: 18.4 SECONDS (ref 11.6–14.5)
SODIUM SERPL-SCNC: 137 MMOL/L (ref 135–147)

## 2023-10-09 PROCEDURE — 36415 COLL VENOUS BLD VENIPUNCTURE: CPT

## 2023-10-09 PROCEDURE — 85610 PROTHROMBIN TIME: CPT

## 2023-10-09 PROCEDURE — 80048 BASIC METABOLIC PNL TOTAL CA: CPT

## 2023-10-11 ENCOUNTER — TELEPHONE (OUTPATIENT)
Dept: CARDIAC SURGERY | Facility: CLINIC | Age: 73
End: 2023-10-11

## 2023-10-11 DIAGNOSIS — Z95.1 S/P CABG X 2: ICD-10-CM

## 2023-10-11 DIAGNOSIS — I25.10 CORONARY ARTERY DISEASE INVOLVING NATIVE CORONARY ARTERY OF NATIVE HEART WITHOUT ANGINA PECTORIS: ICD-10-CM

## 2023-10-11 DIAGNOSIS — I48.0 PAROXYSMAL A-FIB (HCC): ICD-10-CM

## 2023-10-11 RX ORDER — WARFARIN SODIUM 5 MG/1
TABLET ORAL
Qty: 90 TABLET | Refills: 3 | Status: SHIPPED | OUTPATIENT
Start: 2023-10-11

## 2023-10-11 NOTE — TELEPHONE ENCOUNTER
Called Toni Griffin today to follow up after he finished his additional 5 day course of Torsemide. He states that yesterday his weight was 227 lbs, and this morning it was 226 lbs. He did weigh himself this afternoon after eating pizza and drinking coffee and his weight was 230 lbs. He states that he was slightly short of breath going upstairs to weigh himself this afternoon, but also states that this was nothing like he was last week when walking on flat ground. He denies lower extremity edema. I told him that for now we will not do any additional diuretic, but that he should continue to weigh himself in the morning, and to go by those weights. Explained that weights fluctuate throughout the day, especially after eating a high sodium meal. Encouraged him to watch the sodium intake, and continue to keep track of his symptoms. He knows to call if there are any further concerns. Of note, patient's visiting nurse, Rossy Daily called the office, as patient had just called him prior to my phone call. I updated Rossy Daily with my recommendations.

## 2023-10-13 ENCOUNTER — TELEPHONE (OUTPATIENT)
Age: 73
End: 2023-10-13

## 2023-10-16 ENCOUNTER — ANTICOAG VISIT (OUTPATIENT)
Dept: CARDIOLOGY CLINIC | Facility: CLINIC | Age: 73
End: 2023-10-16

## 2023-10-16 ENCOUNTER — LAB (OUTPATIENT)
Dept: LAB | Facility: HOSPITAL | Age: 73
End: 2023-10-16
Payer: MEDICARE

## 2023-10-16 DIAGNOSIS — Z79.01 ANTICOAGULATION MONITORING, INR RANGE 2-3: ICD-10-CM

## 2023-10-16 LAB
INR PPP: 1.99 (ref 0.84–1.19)
PROTHROMBIN TIME: 23.5 SECONDS (ref 11.6–14.5)

## 2023-10-16 PROCEDURE — 36415 COLL VENOUS BLD VENIPUNCTURE: CPT

## 2023-10-16 PROCEDURE — 85610 PROTHROMBIN TIME: CPT

## 2023-10-19 ENCOUNTER — TELEPHONE (OUTPATIENT)
Dept: CARDIOLOGY CLINIC | Facility: CLINIC | Age: 73
End: 2023-10-19

## 2023-10-19 ENCOUNTER — OFFICE VISIT (OUTPATIENT)
Dept: CARDIOLOGY CLINIC | Facility: CLINIC | Age: 73
End: 2023-10-19
Payer: MEDICARE

## 2023-10-19 VITALS
RESPIRATION RATE: 16 BRPM | BODY MASS INDEX: 33.79 KG/M2 | HEART RATE: 62 BPM | HEIGHT: 70 IN | SYSTOLIC BLOOD PRESSURE: 120 MMHG | DIASTOLIC BLOOD PRESSURE: 72 MMHG | WEIGHT: 236 LBS | OXYGEN SATURATION: 99 %

## 2023-10-19 DIAGNOSIS — I10 BENIGN ESSENTIAL HTN: ICD-10-CM

## 2023-10-19 DIAGNOSIS — E78.2 COMBINED HYPERLIPIDEMIA: ICD-10-CM

## 2023-10-19 DIAGNOSIS — I25.10 CORONARY ARTERY DISEASE INVOLVING NATIVE CORONARY ARTERY OF NATIVE HEART WITHOUT ANGINA PECTORIS: Primary | ICD-10-CM

## 2023-10-19 DIAGNOSIS — I48.0 PAROXYSMAL A-FIB (HCC): ICD-10-CM

## 2023-10-19 PROCEDURE — 93000 ELECTROCARDIOGRAM COMPLETE: CPT | Performed by: INTERNAL MEDICINE

## 2023-10-19 PROCEDURE — 99214 OFFICE O/P EST MOD 30 MIN: CPT | Performed by: INTERNAL MEDICINE

## 2023-10-19 NOTE — PROGRESS NOTES
[address] [facility] Pam Purpura  [address]   [facility] [place] [address]  Cardiology Follow Up    Darroll Sheets  1950  [id number]      1. Coronary artery disease involving native coronary artery of native heart without angina pectoris        2. Benign essential HTN        3. Combined hyperlipidemia        4. Paroxysmal A-fib [facility])            Chief Complaint   Patient presents with    Follow-up       Interval History: This patient comes for follow-up visit after recent CABG. Patient had cardiac catheterization which showed significant CAD. Patient does have history of CAD status post MI status post stents in the past.  Patient subsequently underwent two-vessel CABG with LIMA to LAD and SVG to PDA by Dr. Aide Cardona. Patient had postoperative atrial fibrillation but presently maintaining sinus rhythm. Patient is on amiodarone and warfarin. Patient denies any chest pain or shortness of breath. He has been recovering well from his bypass. He has not been using any pain medications at this time. He has an appointment with cardiac surgery tomorrow. Wife present during the office visit. They do have a lot of questions.     Patient Active Problem List   Diagnosis    Coronary artery disease involving native coronary artery of native heart without angina pectoris    Hypertension, essential    Combined hyperlipidemia    Impaired fasting glucose    Seborrheic keratosis    Screening for skin condition    History of skin cancer    ERIC (obstructive sleep apnea)    S/P CABG x 2    Platelets decreased ([address])     Past Medical History:   Diagnosis Date    Abnormal LFTs     Benign neoplasm of skin     Cancer (HCC)     Chronic kidney disease     Hypertension     Lumbar canal stenosis     with neurogenic claudication     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, congenital     Myocardial infarction ([address])     Nephrotic syndrome     Nonmelanoma skin cancer     last assessed 1/11/18    Old myocardial infarction     Skin cancer     last assessed 1/13/14    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      Social History     Socioeconomic History    Marital status: /Civil Union     Spouse name: Not on file    Number of children: Not on file    Years of education: Not on file    Highest education level: Not on file   Occupational History    Occupation: retired [employer] teacher    Tobacco Use    Smoking status: Never    Smokeless tobacco: Never   Vaping Use    Vaping Use: Never used   Substance and Sexual Activity    Alcohol use: Not Currently     Alcohol/week: 1.0 standard drink of alcohol     Types: 1 Glasses of wine per week     Comment: Wine consumption (__glasses/day)     Drug use: No    Sexual activity: Not Currently     Birth control/protection: None   Other Topics Concern    Not on file   Social History Narrative    DME: CPAP    Living independently with spouse     No advance directives     Social Determinants of Health     Financial Resource Strain: Not on file   Food Insecurity: No Food Insecurity (9/21/2023)    Hunger Vital Sign     Worried About Running Out of Food in the Last Year: Never true     Ran Out of Food in the Last Year: Never true   Transportation Needs: No Transportation Needs (9/21/2023)    PRAPARE - Transportation     Lack of Transportation (Medical): No     Lack of Transportation (Non-Medical):  No   Physical Activity: Sufficiently Active (2/23/2021)    Exercise Vital Sign     Days of Exercise per Week: 4 days     Minutes of Exercise per Session: 60 min   Stress: No Stress Concern Present (2/23/2021)    [address] [facility]     Feeling of Stress : Not at all   Social Connections: Not on file   Intimate Partner Violence: Not on file   Housing Stability: [address]  (9/21/2023)    Housing Stability Vital Sign     Unable to Pay for Housing in the Last Year: No     Number of Places Lived in the Last Year: 1     Unstable Housing in the Last Year: No      Family History Problem Relation Age of Onset    Heart attack Father 50    Cancer Father     Heart disease Father         Heart attack    Coronary artery disease Family     Dementia Mother     Heart disease Brother         Bypass surgery    Cancer Brother          of cancer     Past Surgical History:   Procedure Laterality Date    ATHERECTOMY      1 with stent placement  last assessed 14    CARDIAC CATHETERIZATION      outcome: successful  last assessed 14    CARDIAC CATHETERIZATION Left 2023    Procedure: Cardiac Left Heart Cath;  Surgeon: Demetri Rowell MD;  Location: [address] CATH LAB; Service: Cardiology    CARDIAC CATHETERIZATION N/A 2023    Procedure: Cardiac Coronary Angiogram;  Surgeon: Demetri Rowell MD;  Location: [address] CATH LAB; Service: Cardiology    CARDIAC CATHETERIZATION  2023    Procedure: Cardiac catheterization;  Surgeon: Demetri Rowell MD;  Location: [address] CATH LAB; Service: Cardiology    CORONARY ANGIOPLASTY      CORONARY ANGIOPLASTY WITH STENT PLACEMENT      to LAD, diagonal and RCA    CORONARY ARTERY BYPASS GRAFT      MALIGNANT SKIN LESION EXCISION  2012    Trunk,  SCC chest     NC CORONARY ARTERY BYP W/VEIN & ARTERY GRAFT 3 VEIN N/A 2023    Procedure: CORONARY ARTERY BYPASS GRAFT (CABG) X2 VESSELS, LIMA - LAD,  LEFT LEG EVH - PDA , W/ CALOS;  Surgeon: Dayami Lama MD;  Location: [facility] MAIN OR;  Service: Cardiac Surgery       Current Outpatient Medications:     acetaminophen (TYLENOL) 325 mg tablet, Take 2 tablets (650 mg total) by mouth every 8 (eight) hours, Disp: 180 tablet, Rfl: 0    amiodarone 200 mg tablet, Take 1 tablet (200 mg total) by mouth 2 (two) times a day for 10 days, THEN 1 tablet (200 mg total) daily. , Disp: 50 tablet, Rfl: 0    aspirin 81 MG tablet, Take 1 tablet by mouth daily, Disp: , Rfl:     atorvastatin (LIPITOR) 80 mg tablet, Take 1 tablet (80 mg total) by mouth daily with dinner, Disp: 30 tablet, Rfl: 2 metoprolol tartrate (LOPRESSOR) 25 mg tablet, Take 1 tablet (25 mg total) by mouth every 12 (twelve) hours, Disp: 60 tablet, Rfl: 2    pantoprazole (PROTONIX) 40 mg tablet, Take 1 tablet (40 mg total) by mouth daily in the early morning Do not start before September 25, 2023., Disp: 30 tablet, Rfl: 0    tamsulosin (FLOMAX) 0.4 mg, Take 2 capsules (0.8 mg total) by mouth daily with dinner, Disp: 60 capsule, Rfl: 2    warfarin (Coumadin) 5 mg tablet, Take 1 tablet daily or as directed, Disp: 90 tablet, Rfl: 3  No Known Allergies    Labs:  Lab on 10/16/2023   Component Date Value    Protime 10/16/2023 23.5 (H)     INR 10/16/2023 1.99 (H)    Appointment on 10/09/2023   Component Date Value    Protime 10/09/2023 18.4 (H)     INR 10/09/2023 1.46 (H)     Sodium 10/09/2023 137     Potassium 10/09/2023 4.9     Chloride 10/09/2023 100     CO2 10/09/2023 31     ANION GAP 10/09/2023 6     BUN 10/09/2023 21     Creatinine 10/09/2023 1.15     Glucose 10/09/2023 112     Calcium 10/09/2023 9.2     eGFR 10/09/2023 63    Orders Only on 09/29/2023   Component Date Value    Protime 10/02/2023 17.2 (H)     INR 10/02/2023 1.42 (H)    No results displayed because visit has over 200 results.       Lab Requisition on 09/08/2023   Component Date Value    ABO Grouping 09/08/2023 A     Rh Factor 09/08/2023 Negative     Antibody Screen 09/08/2023 Negative     Specimen Expiration Date 09/08/2023 [id number]    Appointment on 09/08/2023   Component Date Value    Sodium 09/08/2023 136     Potassium 09/08/2023 4.1     Chloride 09/08/2023 105     CO2 09/08/2023 24     ANION GAP 09/08/2023 7     BUN 09/08/2023 15     Creatinine 09/08/2023 0.84     Glucose, Fasting 09/08/2023 76     Calcium 09/08/2023 9.8     eGFR 09/08/2023 88     Hemoglobin A1C 09/08/2023 5.4     EAG 09/08/2023 108    Admission on 09/06/2023, Discharged on 09/06/2023   Component Date Value    Ventricular Rate 09/06/2023 58     Atrial Rate 09/06/2023 58     WY Interval 09/06/2023 200 QRSD Interval 09/06/2023 80     QT Interval 09/06/2023 398     QTC Interval 09/06/2023 390     P Axis 09/06/2023 42     QRS Axis 09/06/2023 -61     T Wave Axis 09/06/2023 81      Imaging: XR chest portable    Result Date: 9/21/2023  Narrative: CHEST INDICATION:   Post Open Heart Surgey. COMPARISON: 9/20/2023 EXAM PERFORMED/VIEWS:  XR CHEST PORTABLE FINDINGS: The right internal jugular central venous catheter and left chest tube remain in place with other lines and tubes seen previously removed. Cardiomediastinal silhouette appears unremarkable. The lungs are clear. No pneumothorax or pleural effusion. Osseous structures appear within normal limits for patient age. Impression: No acute cardiopulmonary disease. Workstation performed: [id number]     CALOS intraop interventional w/realtime guidance of cardiac procedures    Result Date: 9/21/2023  Narrative: This order contains the linked images for the CALOS that was performed by the Anesthesiologist.  Please see the  CARDIAC CALOS ANESTHESIA procedure for results. XR chest portable ICU    Result Date: 9/20/2023  Narrative: CHEST INDICATION:   S/P open heart. COMPARISON: Compared with 9/8/2023 EXAM PERFORMED/VIEWS:  XR CHEST PORTABLE ICU FINDINGS: Sternotomy wires. Endotracheal tube above the layne. Pericardial catheters. Right neck catheter with tip in the cavoatrial region. Right neck [place] catheter tip in the main pulmonary artery. Left chest tube tip in the mid lung region. Cardiomediastinal silhouette appears unremarkable. Diffuse prominent vascular markings. The lungs are clear. No pneumothorax or pleural effusion. Osseous structures appear within normal limits for patient age. Impression: Lines and tubes as described. Mild congestive  changes. Workstation performed: Tate Dotson bedside procedure    Result Date: 9/20/2023  Narrative: [id number]. [id number]    CALOS Anesthesia    Result Date: 9/20/2023  Narrative: Tracie Cornell MD     9/20/2023 11:40 AM Procedure Performed: CALOS Anesthesia Start Time:  9/20/2023 8:50 AM Preanesthesia Checklist Patient identified, IV assessed, risks and benefits discussed, monitors and equipment assessed, procedure being performed at surgeon's request and anesthesia consent obtained. Procedure Diagnostic Indications for CALOS:  hemodynamic monitoring. Type of CALOS: complete CALOS with interpretation. Images Saved: ultrasound permanent image saved. Physician Requesting Echo: Argelia Sanders MD.  Location performed: OR. Intubated. Bite block not placed. Heart visualized. Insertion of CALOS Probe:  Atraumatic. Probe Type:  Epiaortic and multiplane. Modalities:  3D, color flow mapping, continuous wave Doppler and pulse wave Doppler. Echocardiographic and Doppler Measurements PREPROCEDURE LEFT VENTRICLE: Systolic Function: mildly impaired. Ejection Fraction: 45%. Regional Wall Motion Abnormalities: mid ant-sept, sept, infsept severe HK (myocardial thinned, aneurysmal appearing). RIGHT VENTRICLE: Systolic Function: normal.  Cavity size normal.  AORTIC VALVE: Leaflets: normal and trileaflet. Leaflet motions normal and normal. Stenosis: none. Regurgitation: none. MITRAL VALVE: Leaflets: normal. Leaflet Motions: prolapse. Regurgitation: mild. Stenosis: none. TRICUSPID VALVE: Leaflets: normal.   Regurgitation: trace. ASCENDING AORTA:   Dissection not present. AORTIC ARCH:   dissection not present. Grade 2: severe intimal thickening without protruding atheroma. DESCENDING AORTA:   Dissection not present. Grade 2: severe intimal thickening without protruding atheroma. LEFT ATRIAL APPENDAGE:   Emptying Velocity: 4 cm/sec. OTHER ATRIAL FINDINGS: No JOEL clot. ATRIAL SEPTUM: Intra-atrial septal morphology: no PFO by CFD. EPIAORTIC: Plaque Thickness: 0-5 mm. POSTPROCEDURE LEFT VENTRICLE: Unchanged . RIGHT VENTRICLE: Unchanged . AORTIC VALVE: Unchanged . MITRAL VALVE: Unchanged .     TRICUSPID VALVE: Unchanged . AORTA: Unchanged . Dissection: Dissection not present. REMOVAL: Probe Removal: atraumatic. Review of Systems:  Review of Systems  REVIEW OF SYSTEMS:  Constitutional:  Denies fever or chills   Eyes:  Denies change in visual acuity   HENT:  Denies nasal congestion or sore throat   Respiratory:  Denies cough or shortness of breath   Cardiovascular:  Denies chest pain or edema   GI:  Denies abdominal pain, nausea, vomiting, bloody stools or diarrhea   :  Denies dysuria, frequency, difficulty in micturition and nocturia  Musculoskeletal:  Denies back pain or joint pain   Neurologic:  Denies headache, focal weakness or sensory changes   Endocrine:  Denies polyuria or polydipsia   Lymphatic:  Denies swollen glands   Psychiatric:  Denies depression or anxiety    Physical Exam:    /72 (BP Location: Left arm, Patient Position: Sitting, Cuff Size: Standard)   Pulse 62   Resp 16   Ht 5' 10" (1.778 m)   Wt 107 kg (236 lb)   SpO2 99%   BMI 33.86 kg/m²     Physical Exam  PHYSICAL EXAM:  General:  Patient is not in acute distress   Head: Normocephalic, Atraumatic. HEENT:  Both pupils normal-size atraumatic, normocephalic, nonicteric  Neck:  JVP not raised. Trachea central. No carotid bruit  Respiratory:  normal breath sounds no crackles. no rhonchi  Cardiovascular:  Regular rate and rhythm no S3 no murmurs  GI:  Abdomen soft nontender. No organomegaly. Lymphatic:  No cervical or inguinal lymphadenopathy  Neurologic:  Patient is awake alert, oriented . Grossly nonfocal  Extremities trace edema    Coronary bypass surgery is well-healed. EKG shows sinus bradycardia first-degree AV block, inferior infarct age-indeterminate. Discussion/Summary:    Patient with multiple medical problems who seems to be doing reasonably well from cardiac standpoint. Previous studies reviewed with patient. Medications reviewed and possible side effects discussed.  concepts of cardiovascular disease , signs and symptoms of heart disease. Dietary and risk factor modification reinforced. All questions answered. Safety measures reviewed. Patient advised to report any problems prompting medical attention. Events which led to cardiac catheterization and subsequent two-vessel CABG with LIMA to LAD and SVG to PDA discussed with patient and family. Patient did develop postoperative atrial fibrillation. Patient presently maintaining sinus rhythm. At this time we will discontinue amiodarone. We will place the patient on cardiac event monitor for 2 weeks. Hopefully patient is able to maintain sinus rhythm, consider discontinuation warfarin. Symptoms to watch her from 5 standpoint which were indicate the need for further cardiac evaluation discussed. All questions answered. Medications reviewed. Follow-up in a few weeks or earlier as needed. Patient and family agreeable with the plan of care.

## 2023-10-19 NOTE — TELEPHONE ENCOUNTER
S/w pt. Provided usage support for [employer] monitor.  Pt verbalized understanding

## 2023-10-19 NOTE — TELEPHONE ENCOUNTER
Patient called. He is having issues with his monitor. He states it is not coming on at all when he pushes the button but when he plugs it in to recharge the only thing that comes up is the percentage. The rest of the screen is blank.     [phone]

## 2023-10-20 ENCOUNTER — OFFICE VISIT (OUTPATIENT)
Dept: CARDIAC SURGERY | Facility: CLINIC | Age: 73
End: 2023-10-20

## 2023-10-20 VITALS
WEIGHT: 237.7 LBS | HEIGHT: 70 IN | DIASTOLIC BLOOD PRESSURE: 76 MMHG | BODY MASS INDEX: 34.03 KG/M2 | OXYGEN SATURATION: 98 % | HEART RATE: 70 BPM | TEMPERATURE: 96.3 F | SYSTOLIC BLOOD PRESSURE: 131 MMHG

## 2023-10-20 DIAGNOSIS — Z95.1 S/P CABG X 2: Primary | ICD-10-CM

## 2023-10-20 NOTE — PROGRESS NOTES
Procedure: S/P coronary artery bypass grafting, performed on 9/20/23. History: Morgan Looney is a 71y.o. year old male who presents to our office today for routine post-surgical follow up care. He had several episodes of postoperative atrial fibrillation, which was treated with amiodarone bolus and drip. He was ultimately discharged home on postop day #4 with amiodarone and warfarin. He has been doing well at home overall. He required 5 additional days of diuretic for fluid retention, weight gain and shortness of breath, which has now resolved. He has been walking about 2 miles, or 50 minutes each day, and feels well doing so. He does not plan on going to cardiac rehab, as he has done rehab twice before after his prior MI's, and he is motivated to get back to the gym. He has followed up with his PCP, and saw Dr. Elías Alvarenga yesterday, and was given a Zio patch for two weeks. Evaluation of this will determine whether he needs to continue with warfarin, which patient is hoping he will no longer need. Vital Signs:   Vitals:    10/20/23 1508 10/20/23 1513   BP: 133/83 131/76   BP Location: Left arm Right arm   Patient Position: Sitting Sitting   Cuff Size: Large Large   Pulse: 70    Temp: (!) 96.3 °F (35.7 °C)    TempSrc: Tympanic    SpO2: 98%    Weight: 108 kg (237 lb 11.2 oz)    Height: 5' 10" (1.778 m)        Home Medications:   Prior to Admission medications    Medication Sig Start Date End Date Taking?  Authorizing Provider   acetaminophen (TYLENOL) 325 mg tablet Take 2 tablets (650 mg total) by mouth every 8 (eight) hours 9/24/23 10/24/23 Yes Jeffry Arndt PA-C   aspirin 81 MG tablet Take 1 tablet by mouth daily   Yes Historical Provider, MD   atorvastatin (LIPITOR) 80 mg tablet Take 1 tablet (80 mg total) by mouth daily with dinner 9/24/23 12/23/23 Yes Jeffry Arndt PA-C   metoprolol tartrate (LOPRESSOR) 25 mg tablet Take 1 tablet (25 mg total) by mouth every 12 (twelve) hours 9/24/23 12/23/23 Yes Hamilton Ha FERNY Ibarra   pantoprazole (PROTONIX) 40 mg tablet Take 1 tablet (40 mg total) by mouth daily in the early morning Do not start before September 25, 2023. 9/25/23 10/25/23 Yes Barbra Ramos PA-C   tamsulosin [facility]) 0.4 mg Take 2 capsules (0.8 mg total) by mouth daily with dinner 9/24/23 12/23/23 Yes Barbra Ramos PA-C   warfarin (Coumadin) 5 mg tablet Take 1 tablet daily or as directed 10/11/23  Yes Win Youssef MD       Physical Exam:    HEENT/NECK:  Normocephalic. Atraumatic. No jugular venous distention. Cardiac: Regular rate and rhythm  Pulmonary:  Breath sounds clear bilaterally  Abdomen:  Non-tender and Non-distended  Incisions: Sternum is stable. Incision is clean, dry, and intact. and Saphenectomy incison is clean, dry, and intact. Extremities: Extremities warm/dry  Neuro: Alert and oriented X 3. Sensation is grossly intact. No focal deficits. Skin: Warm/Dry, without rashes or lesions. Lab Results:               Invalid input(s): "LABGLOM"  Results from last 7 days   Lab Units 10/16/23  0854   INR  1.99*     Lab Results   Component Value Date    HGBA1C 5.4 09/08/2023     No results found for: "CKTOTAL", "CKMB", "CKMBINDEX", "TROPONINI"      Assessment: Coronary artery disease. S/P coronary artery bypass grafting;    Plan:     Kerry Laboy continues to recover well following surgery. To date they have made progressive improvements with their physical rehabilitation. At this point I have cleared them to begin outpatient cardiac rehabilitation and have encouraged them to to do so. Kerry Laboy has also been cleared to resume driving. I asked that them do so cautiously, in progressive increments. I did remind them of their ongoing lifting restrictions of 25 pounds for an additional 2 months. Kerry Laboy has already been evaluated by their primary care physician cardiologist for ongoing medical care.  At this point we will not schedule Kerry Laboy  for routine followup care with our office. Future medical management will be directed by their outpatient cardiologist.. I have advised them to call with any new concerns that may arise. Esme Munoz was comfortable with our recommendations and their questions were answered to their satisfaction. The patient recently had a screening colonoscopy in 2013. Therefore GI referral is not indicated at this time. -- he has a script to schedule his routine colonoscopy at home.      Guerry Collet, PA-C  10/20/23

## 2023-10-23 ENCOUNTER — ANTICOAG VISIT (OUTPATIENT)
Dept: CARDIOLOGY CLINIC | Facility: CLINIC | Age: 73
End: 2023-10-23

## 2023-10-23 ENCOUNTER — APPOINTMENT (OUTPATIENT)
Dept: LAB | Facility: HOSPITAL | Age: 73
End: 2023-10-23
Attending: INTERNAL MEDICINE
Payer: MEDICARE

## 2023-10-23 DIAGNOSIS — Z79.01 ANTICOAGULATION MONITORING, INR RANGE 2-3: ICD-10-CM

## 2023-10-23 LAB
INR PPP: 1.39 (ref 0.84–1.19)
PROTHROMBIN TIME: 17.8 SECONDS (ref 11.6–14.5)

## 2023-10-23 PROCEDURE — 36415 COLL VENOUS BLD VENIPUNCTURE: CPT

## 2023-10-23 PROCEDURE — 85610 PROTHROMBIN TIME: CPT

## 2023-10-26 ENCOUNTER — OFFICE VISIT (OUTPATIENT)
Age: 73
End: 2023-10-26
Payer: MEDICARE

## 2023-10-26 VITALS — BODY MASS INDEX: 32.93 KG/M2 | WEIGHT: 230 LBS | HEIGHT: 70 IN

## 2023-10-26 DIAGNOSIS — L82.1 SEBORRHEIC KERATOSIS: ICD-10-CM

## 2023-10-26 DIAGNOSIS — Z85.828 HISTORY OF SKIN CANCER: ICD-10-CM

## 2023-10-26 DIAGNOSIS — L57.0 ACTINIC KERATOSIS: ICD-10-CM

## 2023-10-26 DIAGNOSIS — D18.01 CHERRY ANGIOMA: ICD-10-CM

## 2023-10-26 DIAGNOSIS — D22.9 NEVUS: ICD-10-CM

## 2023-10-26 DIAGNOSIS — Z13.89 SCREENING FOR SKIN CONDITION: Primary | ICD-10-CM

## 2023-10-26 PROCEDURE — 17003 DESTRUCT PREMALG LES 2-14: CPT | Performed by: DERMATOLOGY

## 2023-10-26 PROCEDURE — 99214 OFFICE O/P EST MOD 30 MIN: CPT | Performed by: DERMATOLOGY

## 2023-10-26 PROCEDURE — 17000 DESTRUCT PREMALG LESION: CPT | Performed by: DERMATOLOGY

## 2023-10-26 NOTE — PATIENT INSTRUCTIONS
ACTINIC KERATOSIS    Actinic keratoses are very common on sites repeatedly exposed to the sun, especially the backs of the hands and the face, most often affecting the ears, nose, cheeks, upper lip, vermilion of the lower lip, temples, forehead and balding scalp. In severely chronically sun-damaged individuals, they may also be found on the upper trunk, upper and lower limbs, and dorsum of feet. We discussed the theoretical premalignant (“pre-cancerous”) nature and etiology of these growths. We discussed the prevailing notion that actinic keratoses are a reflection of abnormal skin cell development due to DNA damage by short wavelength UVB. They are more likely to appear if the immune function is poor, due to aging, recent sun exposure, predisposing disease or certain drugs. We discussed that the main concern is that actinic keratoses may predispose to squamous cell carcinoma. It is rare for a solitary actinic keratosis to evolve to squamous cell carcinoma (SCC), but the risk of SCC occurring at some stage in a patient with more than 10 actinic keratoses is thought to be about 10 to 15%. A tender, thickened, ulcerated or enlarging actinic keratosis is suspicious of SCC. Actinic keratoses may be prevented by strict sun protection. If already present, keratoses may improve with a very high sun protection factor (50+) broad-spectrum sunscreen applied at least daily to affected areas, year-round. We recommend that UPF-rated clothing and hats and sunglasses be worn whenever possible and that a sunscreen-moisturizer combination product such as Neutrogena Daily Defense be applied at least three times a day.     We performed a thorough discussion of treatment options and specific risk/benefits/alternatives including but not limited to medical “field” treatment with medications such as the following:    Topical “field area” medications such as 5-fluorouracil or Aldara (specifically, the trouble with long-term compliance, blistering and local skin reaction versus the convenience of at-home therapy and that field therapy “gets what is not yet seen”). Cryotherapy (specifically, local pain, scarring, dyspigmentation, blistering, possible superinfection, and treats “only what we see” versus directed treatment today). Photodynamic therapy (specifically, local pain, scarring, dyspigmentation, blistering, possible superinfection, need to schedule for a later date, and time spent in the office versus field therapy that “gets what is not yet seen”). Treatment with Cryotherapy    The doctor has treated your skin with nitrogen, which is 320 degrees Fahrenheit below zero. He has given the treated area "frostbite."    Stinging should subside within a few hours. You can take Tylenol for pain, if needed. Over the next few days, it is normal if the area becomes reddened, a blood blister, or swollen with fluid. If the lesion treated was near the eye - you could get a swollen eye over the next few days. Do not panic! This is all temporary, and will resolve with time. There is no special treatment - just keep the area clean. Makeup and BandAids can be used, if preferred. When the area starts to dry up and peel off, using Vaseline can help healing. It usually takes up to a month for it to heal.  Some lesions are recurrent and may require repeat treatments. If a lesion has not healed in one month, please don't hesitate to contact us. If you have any further questions that are not answered here, please call us. [phone]. Thank you for allowing us to care for you. SQUAMOUS CELL CARCINOMA    What is cutaneous squamous cell carcinoma? Cutaneous squamous cell carcinoma (SCC) is a common type of keratinocyte or non-melanoma skin cancer. It is derived from cells within the epidermis that make keratin -- the horny protein that makes up skin, hair and nails.   Cutaneous SCC is an invasive disease, referring to cancer cells that have grown beyond the epidermis. SCC can sometimes metastasise and may prove fatal.  Intraepidermal carcinoma (cutaneous SCC in situ) and mucosal SCC are considered elsewhere. Who gets cutaneous squamous cell carcinoma? Risk factors for cutaneous SCC include:  Age and sex: SCCs are particularly prevalent in elderly males. However, they also affect females and younger adults. Previous SCC or another form of skin cancer (basal cell carcinoma, melanoma) are a strong predictor for further skin cancers. Actinic keratoses   Outdoor occupation or recreation   Smoking   Fair skin, blue eyes and blond or red hair   Previous cutaneous injury, thermal burn, disease (eg cutaneous lupus, epidermolysis bullosa, leg ulcer)   Inherited syndromes: SCC is a particular problem for families with xeroderma pigmentosum and albinism   Other risk factors include ionising radiation, exposure to arsenic, and immune suppression due to disease (eg chronic lymphocytic leukaemia) or medicines. Organ transplant recipients have a massively increased risk of developing SCC. What causes cutaneous squamous cell carcinoma? More than 90% of cases of SCC are associated with numerous DNA mutations in multiple somatic genes. Mutations in the p53 tumour suppressor gene are caused by exposure to ultraviolet radiation (UV), especially UVB (known as signature 7). Other signature mutations relate to cigarette smoking, ageing and immune suppression (eg, to drugs such as azathioprine). Mutations in signalling pathways affect the epidermal growth factor receptor, PADILLA, Fyn, and [id number] signalling. Beta-genus human papillomaviruses (wart virus) are thought to play a role in SCC arising in immune-suppressed populations. ?-HPV and HPV subtypes 5, 8, 17, 20, 24, and 38 have also been associated with an increased risk of cutaneous SCC in immunocompetent individuals.      What are the clinical features of cutaneous squamous cell carcinoma? Cutaneous SCCs present as enlarging scaly or crusted lumps. They usually arise within pre-existing actinic keratosis or intraepidermal carcinoma. They grow over weeks to months   They may ulcerate   They are often tender or painful   Located on sun-exposed sites, particularly the face, lips, ears, hands, forearms and lower legs   Size varies from a few millimetres to several centimetres in diameter. Types of cutaneous squamous cell carcinoma  Distinct clinical types of invasive cutaneous SCC include:  Cutaneous horn -- the horn is due to excessive production of keratin   Keratoacanthoma (KA) -- a rapidly growing keratinising nodule that may resolve without treatment   Carcinoma cuniculatum (‘verrucous carcinoma’), a slow-growing, warty tumour on the sole of the foot. Multiple eruptive SCC/KA-like lesions arising in syndromes, such as multiple self-healing squamous epitheliomas of Maynard-Smith and Grzybowski syndrome  The pathologist may classify a tumour as well differentiated, moderately well differentiated, poorly differentiated or anaplastic cutaneous SCC. There are other variants. Classification of squamous cell carcinoma by risk  Cutaneous SCC is classified as low-risk or high-risk, depending on the chance of tumour recurrence and metastasis. Characteristics of high-risk SCC include:  High-risk cutaneous squamous cell carcinoma has the following characteristics:  Diameter greater than or equal to 2 cm   Location on the ear, vermilion of the lip, central face, hands, feet, genitalia   Arising in elderly or immune suppressed patient   Histological thickness greater than 2 mm, poorly differentiated histology, or with the invasion of the subcutaneous tissue, nerves and blood vessels  Metastatic SCC is found in regional lymph nodes (80%), lungs, liver, brain, bones and skin.     Staging cutaneous squamous cell carcinoma  In 2011, the [address] on Cancer (AJCC) published a new staging systemic for cutaneous SCC for the 7th Edition of the AJCC manual. This evaluates the dimensions of the original primary tumour (T) and its metastases to lymph nodes (N). Tumour staging for cutaneous SCC  TX: Th Primary tumour cannot be assessed  T0: No evidence of a primary tumour  Tis: Carcinoma in situ  T1: Tumour ? 2cm without high-risk features  T2: Tumour ? 2cm; or; Tumour ? 2 cm with high-risk features  T3: Tumour with the invasion of maxilla, mandible, orbit or temporal bone  T4: Tumour with the invasion of axial or appendicular skeleton or perineural invasion of skull base    Jimmy staging for cutaneous SCC  NX: Regional lymph nodes cannot be assessed  N0: No regional lymph node metastasis  N1: Metastasis in one local lymph node ? 3cm  N2: Metastasis in one local lymph node ? 3cm; or; Metastasis in >1 local lymph node ? 6cm  N3: Metastasis in lymph node ? 6cm    How is squamous cell carcinoma diagnosed? Diagnosis of cutaneous SCC is based on clinical features. The diagnosis and histological subtype are confirmed pathologically by diagnostic biopsy or following excision. See squamous cell carcinoma - pathology. Patients with high-risk SCC may also undergo staging investigations to determine whether it has spread to lymph nodes or elsewhere. These may include:  Imaging using ultrasound scan, X-rays, CT scans, MRI scans   Lymph node or other tissue biopsies    What is the treatment for cutaneous squamous cell carcinoma? Cutaneous SCC is nearly always treated surgically. Most cases are excised with a 3-10 mm margin of normal tissue around a visible tumour. A flap or skin graft may be needed to repair the defect.   Other methods of removal include:  Shave, curettage, and electrocautery for low-risk tumours on trunk and limbs   Aggressive cryotherapy for very small, thin, low-risk tumours   Mohs micrographic surgery for large facial lesions with indistinct margins or recurrent tumours Radiotherapy for an inoperable tumour, patients unsuitable for surgery, or as adjuvant    What is the treatment for advanced or metastatic squamous cell carcinoma? Locally advanced primary, recurrent or metastatic SCC requires multidisciplinary consultation. Often a combination of treatments is used. Surgery   Radiotherapy   Cemiplimab   Experimental targeted therapy using epidermal growth factor receptor inhibitors    How can cutaneous squamous cell carcinoma be prevented? There is a great deal of evidence to show that very careful sun protection at any time of life reduces the number of SCCs. This is particularly important in ageing, sun-damaged, fair skin; in patients that are immune suppressed; and in those who already have actinic keratoses or previous SCC. Stay indoors or under the shade in the middle of the day   Wear covering clothing   Apply high protection factor SPF50+ broad-spectrum sunscreens generously to exposed skin if outdoors   Avoid indoor tanning (sun beds, solaria)    Oral nicotinamide (vitamin B3) in a dose of 500 mg twice daily may reduce the number and severity of SCCs in people at high risk. Patients with multiple squamous cell carcinomas may be prescribed an oral retinoid (acitretin or isotretinoin). These reduce the number of tumours but have some nuisance side effects. What is the outlook for cutaneous squamous cell carcinoma? Most SCCs are cured by treatment. A cure is most likely if treatment is undertaken when the lesion is small. The risk of recurrence or disease-associated death is greater for tumours that are > 20 mm in diameter and/or > 2 mm in thickness at the time of surgical excision. About 50% of people at high risk of SCC develop a second one within 5 years of the first. They are also at increased risk of other skin cancers, especially melanoma.  Regular self-skin examinations and long-term annual skin checks by an experienced health professional are recommended. MELANOCYTIC NEVI ("Moles")    Melanocytic nevi ("moles") are tan or brown, raised or flat areas of the skin which have an increased number of melanocytes. Melanocytes are the cells in our body which make pigment and account for skin color. Some moles are present at birth (I.e., "congenital nevi"), while others come up later in life (i.e., "acquired nevi"). The sun can stimulate the body to make more moles. Sunburns are not the only thing that triggers more moles. Chronic sun exposure can do it too. Clinically distinguishing a healthy mole from melanoma may be difficult, even for experienced dermatologists. The "ABCDE's" of moles have been suggested as a means of helping to alert a person to a suspicious mole and the possible increased risk of melanoma. The suggestions for raising alert are as follows:    Asymmetry: Healthy moles tend to be symmetric, while melanomas are often asymmetric. Asymmetry means if you draw a line through the mole, the two halves do not match in color, size, shape, or surface texture. Asymmetry can be a result of rapid enlargement of a mole, the development of a raised area on a previously flat lesion, scaling, ulceration, bleeding or scabbing within the mole. Any mole that starts to demonstrate "asymmetry" should be examined promptly by a board certified dermatologist.     Border: Healthy moles tend to have discrete, even borders. The border of a melanoma often blends into the normal skin and does not sharply delineate the mole from normal skin. Any mole that starts to demonstrate "uneven borders" should be examined promptly by a board certified dermatologist.     Color: Healthy moles tend to be one color throughout. Melanomas tend to be made up of different colors ranging from dark black, blue, white, or red.   Any mole that demonstrates a color change should be examined promptly by a board certified dermatologist.     Diameter: Healthy moles tend to be smaller than 0.6 cm in size; an exception are "congenital nevi" that can be larger. Melanomas tend to grow and can often be greater than 0.6 cm (1/4 of an inch, or the size of a pencil eraser). This is only a guideline, and many normal moles may be larger than 0.6 cm without being unhealthy. Any mole that starts to change in size (small to bigger or bigger to smaller) should be examined promptly by a board certified dermatologist.     Evolving: Healthy moles tend to "stay the same."  Melanomas may often show signs of change or evolution such as a change in size, shape, color, or elevation. Any mole that starts to itch, bleed, crust, burn, hurt, or ulcerate or demonstrate a change or evolution should be examined promptly by a board certified dermatologist.      Dysplastic Nevi  Dysplastic moles are moles that fit the ABCDE rules of melanoma but are not identified as melanomas when examined under the microscope. They may indicate an increased risk of melanoma in that person. If there is a family history of melanoma, most experts agree that the person may be at an increased risk for developing a melanoma. Experts still do not agree on what dysplastic moles mean in patients without a personal or family history of melanoma. Dysplastic moles are usually larger than common moles and have different colors within it with irregular borders. The appearance can be very similar to a melanoma. Biopsies of dysplastic moles may show abnormalities which are different from a regular mole. Melanoma  Malignant melanoma is a type of skin cancer that can be deadly if it spreads throughout the body. The incidence of melanoma in the [facility] is growing faster than any other cancer. Melanoma usually grows near the surface of the skin for a period of time, and then begins to grow deeper into the skin. Once it grows deeper into the skin, the risk of spread to other organs greatly increases.  Therefore, early detection and removal of a malignant melanoma may result in a better chance at a complete cure; removal after the tumor has spread may not be as effective, leading to worse clinical outcomes such as death. The true rate of nevus transformation into a melanoma is unknown. It has been estimated that the lifetime risk for any acquired melanocytic nevus on any 21year-old individual transforming into melanoma by age 80 is 0.03% (1 in 3,164) for men and 0.009% (1 in 10,800) for women. The appearance of a "new mole" remains one of the most reliable methods for identifying a malignant melanoma. Occasionally, melanomas appear as rapidly growing, blue-black, dome-shaped bumps within a previous mole or previous area of normal skin. Other times, melanomas are suspected when a mole suddenly appears or changes. Itching, burning, or pain in a pigmented lesion should increase suspicion, but most patients with early melanoma have no skin discomfort whatsoever. Melanoma can occur anywhere on the skin, including areas that are difficult for self-examination. Many melanomas are first noticed by other family members. Suspicious-looking moles may be removed for microscopic examination. You may be able to prevent death from melanoma by doing two simple things:    Try to avoid unnecessary sun exposure and protect your skin when it is exposed to the sun. People who live near the equator, people who have intermittent exposures to large amounts of sun, and people who have had sunburns in childhood or adolescence have an increased risk for melanoma. Sun sense and vigilant sun protection may be keys to helping to prevent melanoma. We recommend wearing UPF-rated sun protective clothing and sunglasses whenever possible and applying a moisturizer-sunscreen combination product (SPF 50+) such as Neutrogena Daily Defense to sun exposed areas of skin at least three times a day.     Have your moles regularly examined by a board certified dermatologist AND by yourself or a family member/friend at home. We recommend that you have your moles examined at least once a year by a board certified dermatologist.  Use your birthday as an annual reminder to have your "Birthday Suit" (I.e., your skin) examined; it is a nice birthday gift to yourself to know that your skin is healthy appearing! Additionally, at-home self examinations may be helpful for detecting a possible melanoma. Use the ABCDEs we discussed and check your moles once a month at home. SEBORRHEIC KERATOSIS  A seborrheic keratosis is a harmless warty spot that appears during adult life as a common sign of skin aging. Seborrheic keratoses can arise on any area of skin, covered or uncovered, with the usual exception of the palms and soles. They do not arise from mucous membranes. Seborrheic keratoses can have highly variable appearance. Seborrheic keratoses are extremely common. It has been estimated that over 90% of adults over the age of 61 years have one or more of them. They occur in males and females of all races, typically beginning to erupt in the 35s or 45s. They are uncommon under the age of 21 years. The precise cause of seborrhoeic keratoses is not known. Seborrhoeic keratoses are considered degenerative in nature. As time goes by, seborrheic keratoses tend to become more numerous. Some people inherit a tendency to develop a very large number of them; some people may have hundreds of them. The name "seborrheic keratosis" is misleading, because these lesions are not limited to a seborrhoeic distribution (scalp, mid-face, chest, upper back), nor are they formed from sebaceous glands, nor are they associated with sebum -- which is greasy. Seborrheic keratosis may also be called "SK," "Seb K," "basal cell papilloma," "senile wart," or "barnacle."      There is no easy way to remove multiple lesions on a single occasion.   Unless a specific lesion is "inflamed" and is causing pain or stinging/burning or is bleeding, most insurance companies do not authorize treatment. ANGIOMA ("CHERRY ANGIOMA")  Coppola angiomas markedly increase in number from about the age of 36, so it has been estimated that 75% of people over 76years of age have them. Although they also called "senile angiomas," they can occur in young people too - 5% of adolescents have been found to have them. Cherry angiomas are very common in males and females of any age or race, with no difference in sexes or races affected. They are however more noticeable in white skin than in skin of colour. There may be a family history of similar lesions. Eruptive (very large number appearing in a short period of time) cherry angiomas have been rarely reported to be associated with internal malignancy and pregnancy.

## 2023-10-26 NOTE — PROGRESS NOTES
[facility] Note     Patient Name: India Renteria  Encounter Date: 10/25/2023     Have you been cared for by a [facility] Dermatologist in the last 3 years and, if so, which description applies to you? Yes. I have been here within the last 3 years, and my medical history has NOT changed since that time. I am MALE/not capable of bearing children. REVIEW OF SYSTEMS:  Have you recently had or currently have any of the following? No changes in my recent health. PAST MEDICAL HISTORY:  Have you personally ever had or currently have any of the following? If "YES," then please provide more detail. No changes in my medical history. HISTORY OF IMMUNOSUPPRESSION: Do you have a history of any of the following:  Systemic Immunosuppression such as Diabetes, Biologic or Immunotherapy, Chemotherapy, Organ Transplantation, Bone Marrow Transplantation? No     Answering "YES" requires the addition of the dotphrase "IMMUNOSUPPRESSED" as the first diagnosis of the patient's visit. FAMILY HISTORY:  Any "first degree relatives" (parent, brother, sister, or child) with the following? No changes in my family's known health. PATIENT EXPERIENCE:    Do you want the Dermatologist to perform a COMPLETE skin exam today including a clinical examination under the "bra and underwear" areas? Yes  If necessary, do we have your permission to call and leave a detailed message on your Preferred Phone number that includes your specific medical information?   Yes      No Known Allergies   Current Outpatient Medications:     aspirin 81 MG tablet, Take 1 tablet by mouth daily, Disp: , Rfl:     atorvastatin (LIPITOR) 80 mg tablet, Take 1 tablet (80 mg total) by mouth daily with dinner, Disp: 30 tablet, Rfl: 2    metoprolol tartrate (LOPRESSOR) 25 mg tablet, Take 1 tablet (25 mg total) by mouth every 12 (twelve) hours, Disp: 60 tablet, Rfl: 2    tamsulosin (FLOMAX) 0.4 mg, Take 2 capsules (0.8 mg total) by mouth daily with dinner, Disp: 60 capsule, Rfl: 2    warfarin (Coumadin) 5 mg tablet, Take 1 tablet daily or as directed, Disp: 90 tablet, Rfl: 3    pantoprazole (PROTONIX) 40 mg tablet, Take 1 tablet (40 mg total) by mouth daily in the early morning Do not start before September 25, 2023., Disp: 30 tablet, Rfl: 0          Whom besides the patient is providing clinical information about today's encounter? NO ADDITIONAL HISTORIAN (patient alone provided history)      68year old male with previous history of skin cancer present for a yearly skin exam. Patient reports no concerns. Physical Exam and Assessment/Plan by Diagnosis:    HISTORY OF NON MELANOMA    Physical Exam:  Anatomic Location Affected:  Chest area-records not available. Morphological Description of scar:  scar well healed  Suspected Recurrence: No    Additional History of Present Condition:  History of non melanoma with no sign of recurrence    Assessment and Plan:  Based on a thorough discussion of this condition and the management approach to it (including a comprehensive discussion of the known risks, side effects and potential benefits of treatment), the patient (family) agrees to implement the following specific plan:  Monitor for change  Sun avoidance, protective clothing (known as UPF clothing), and the use of at least SPF 30 sunscreens is advised. Sunscreen should be reapplied every two hours when outside. HISTORY OF SQUAMOUS CELL CARCINOMA     Physical Exam:  Anatomic Location Affected:  left back in 10/2022  Morphological Description of Scar:  scar well healed  Suspected Recurrence: no  Regional adenopathy: no    Additional History of Present Condition:  history of squamous cell carcinoma with no sign of recurrence.      Assessment and Plan:  Based on a thorough discussion of this condition and the management approach to it (including a comprehensive discussion of the known risks, side effects and potential benefits of treatment), the patient (family) agrees to implement the following specific plan:  Monitor for change  Sun avoidance, protective clothing (known as UPF clothing), and the use of at least SPF 30 sunscreens is advised. Sunscreen should be reapplied every two hours when outside. ACTINIC KERATOSIS    Physical Exam:  Anatomic Location: frontal scalp, right and left temple. Morphologic Description: Scaly pink papules    Physical Exam  HENT:      Head:              Plan:  Cryotherapy performed in the office (See Procedure Note). We discussed that a hypopigmentation or scar may form in the region following cryotherapy. We discussed the pre-cancerous nature of the condition. Actinic keratosis is found on sun-damaged skin and there is small risk that the condition could turn into a skin cancer called squamous cell carcinoma. There is no risk of actinic keratosis turning into melanoma. We discussed sun protection measures, including using sunscreen with an SPF 50+ year round, avoiding the sun, and wearing protective clothing such as long sleeves and pants when out in the sun. Continue to monitor clinically for signs of recurrence. Discussed with patient the importance of keeping up to date with full body skin exams. PROCEDURES PERFORMED TODAY ASSOCIATED WITH THIS CONDITION:          Cryotherapy: PROCEDURE:  DESTRUCTION OF PRE-MALIGNANT LESIONS  After a thorough discussion of treatment options and risk/benefits/alternatives (including but not limited to local pain, scarring, dyspigmentation, blistering, and possible superinfection), verbal and written consent were obtained and the aforementioned lesions were treated on with cryotherapy using liquid nitrogen x 1 cycle for 5-10 seconds. TOTAL NUMBER of 9 pre-malignant lesions were treated today on the ANATOMIC LOCATION: frontal scalp, right left temple. The patient tolerated the procedure well, and after-care instructions were provided.           MELANOCYTIC NEVI ("Moles")    Physical Exam:  Anatomic Location Affected: Mostly on sun-exposed areas of the body  Morphological Description:  Scattered, 1-4mm round to ovoid, symmetrical-appearing, even bordered, skin colored to dark brown macules/papules, mostly in sun-exposed areas    Additional History of Present Condition:  present on exam.     Assessment and Plan:  Based on a thorough discussion of this condition and the management approach to it (including a comprehensive discussion of the known risks, side effects and potential benefits of treatment), the patient (family) agrees to implement the following specific plan:  Provided handout with information regarding the ABCDE's of moles   Recommend routine skin exams every six months   Sun avoidance, protective clothing (known as UPF clothing), and the use of at least SPF 30 sunscreens is advised. Sunscreen should be reapplied every two hours when outside. SEBORRHEIC KERATOSIS; NON-INFLAMED    Physical Exam:  Anatomic Location Affected:  scattered across trunk, extremities, face  Morphological Description:  Flat and raised, waxy, smooth to warty textured, yellow to brownish-grey to dark brown to blackish, discrete, "stuck-on" appearing papules. Additional History of Present Condition:  Patient reports new bumps on the skin. Denies itch, burn, pain, bleeding or ulceration. Present constantly; nothing seems to make it worse or better. No prior treatment.       Assessment and Plan:  Based on a thorough discussion of this condition and the management approach to it (including a comprehensive discussion of the known risks, side effects and potential benefits of treatment), the patient (family) agrees to implement the following specific plan:  Reassured benign    ANGIOMA ("CHERRY ANGIOMA")    Physical Exam:  Anatomic Location: scattered across sun exposed areas of the trunk and extremities   Morphologic Description: Firm red to reddish-blue discrete papules    Additional History of Present Condition:  Present on exam.     Assessment and Plan:  Reassured benign       Scribe Attestation      I,:  Maine Niño am acting as a scribe while in the presence of the attending physician.:       I,:  Estrada Nunez MD personally performed the services described in this documentation    as scribed in my presence.:

## 2023-10-31 ENCOUNTER — TELEPHONE (OUTPATIENT)
Dept: CARDIOLOGY CLINIC | Facility: CLINIC | Age: 73
End: 2023-10-31

## 2023-10-31 NOTE — TELEPHONE ENCOUNTER
PT called & stated that the sensor for the [employer] is not working.  PT is requesting a call back from Lianne.       PT can be reached at [phone]

## 2023-10-31 NOTE — TELEPHONE ENCOUNTER
Spoke with patient we try everything he still saying his screen on the monitor for [employer] is gray . I ask patient to come in so we can take a look at the monitor and he stated he was waiting all day for a call and he will come in tomorrow in am .

## 2023-11-01 ENCOUNTER — ANTICOAG VISIT (OUTPATIENT)
Dept: CARDIOLOGY CLINIC | Facility: CLINIC | Age: 73
End: 2023-11-01

## 2023-11-01 ENCOUNTER — LAB (OUTPATIENT)
Dept: LAB | Facility: HOSPITAL | Age: 73
End: 2023-11-01
Payer: MEDICARE

## 2023-11-01 DIAGNOSIS — Z79.01 ANTICOAGULATION MONITORING, INR RANGE 2-3: ICD-10-CM

## 2023-11-01 LAB
INR PPP: 1.31 (ref 0.84–1.19)
PROTHROMBIN TIME: 17 SECONDS (ref 11.6–14.5)

## 2023-11-01 PROCEDURE — 85610 PROTHROMBIN TIME: CPT

## 2023-11-01 PROCEDURE — 36415 COLL VENOUS BLD VENIPUNCTURE: CPT

## 2023-11-06 ENCOUNTER — CLINICAL SUPPORT (OUTPATIENT)
Dept: CARDIOLOGY CLINIC | Facility: CLINIC | Age: 73
End: 2023-11-06
Payer: MEDICARE

## 2023-11-06 DIAGNOSIS — I48.0 PAROXYSMAL A-FIB (HCC): ICD-10-CM

## 2023-11-06 PROCEDURE — 93228 REMOTE 30 DAY ECG REV/REPORT: CPT | Performed by: INTERNAL MEDICINE

## 2023-11-07 ENCOUNTER — OFFICE VISIT (OUTPATIENT)
Dept: CARDIOLOGY CLINIC | Facility: CLINIC | Age: 73
End: 2023-11-07
Payer: MEDICARE

## 2023-11-07 VITALS
SYSTOLIC BLOOD PRESSURE: 126 MMHG | DIASTOLIC BLOOD PRESSURE: 78 MMHG | BODY MASS INDEX: 33.36 KG/M2 | HEART RATE: 61 BPM | OXYGEN SATURATION: 99 % | HEIGHT: 70 IN | WEIGHT: 233 LBS | RESPIRATION RATE: 16 BRPM

## 2023-11-07 DIAGNOSIS — I25.10 CORONARY ARTERY DISEASE INVOLVING NATIVE CORONARY ARTERY OF NATIVE HEART WITHOUT ANGINA PECTORIS: Primary | ICD-10-CM

## 2023-11-07 DIAGNOSIS — E78.2 COMBINED HYPERLIPIDEMIA: ICD-10-CM

## 2023-11-07 DIAGNOSIS — I10 BENIGN ESSENTIAL HTN: ICD-10-CM

## 2023-11-07 PROCEDURE — 99213 OFFICE O/P EST LOW 20 MIN: CPT | Performed by: INTERNAL MEDICINE

## 2023-11-07 NOTE — PROGRESS NOTES
[facility]  [place] [id number]  Cardiology Follow Up    Eveline Fong  1950  [id number]      1. Coronary artery disease involving native coronary artery of native heart without angina pectoris        2. Benign essential HTN        3. Combined hyperlipidemia            Chief Complaint   Patient presents with    Follow-up       Interval History: Patient presents for follow-up visit. Patient denies any history of chest pain shortness of breath. Patient denies any history of leg edema or orthopnea PND. No history of presyncope syncope. Patient states compliance with the present list of medications. Patient seems to be progressing well in terms of recovery from recent CABG. Patient had a cardiac event monitor which showed sinus rhythm and no recurrence of A-fib. Patient was seen by cardiac surgery in  follow-up.     Patient Active Problem List   Diagnosis    Coronary artery disease involving native coronary artery of native heart without angina pectoris    Hypertension, essential    Combined hyperlipidemia    Impaired fasting glucose    Seborrheic keratosis    Screening for skin condition    History of skin cancer    ERIC (obstructive sleep apnea)    S/P CABG x 2    Platelets decreased (HCC)     Past Medical History:   Diagnosis Date    Abnormal LFTs     Benign neoplasm of skin     Cancer (HCC)     Chronic kidney disease     Hypertension     Lumbar canal stenosis     with neurogenic claudication     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, congenital     Myocardial infarction (HCC)     Nephrotic syndrome     Nonmelanoma skin cancer     last assessed 1/11/18    Old myocardial infarction     Skin cancer     last assessed 1/13/14    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      Social History     Socioeconomic History    Marital status: /Civil Union     Spouse name: Not on file    Number of children: Not on file    Years of education: Not on file    Highest education level: Not on file   Occupational History    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    Tobacco Use    Smoking status: Never    Smokeless tobacco: Never   Vaping Use    Vaping Use: Never used   Substance and Sexual Activity    Alcohol use: Not Currently     Alcohol/week: 1.0 standard drink of alcohol     Types: 1 Glasses of wine per week     Comment: Wine consumption (__glasses/day)     Drug use: No    Sexual activity: Not Currently     Birth control/protection: None   Other Topics Concern    Not on file   Social History Narrative    DME: CPAP    Living independently with spouse     No advance directives     Social Determinants of Health     Financial Resource Strain: Not on file   Food Insecurity: No Food Insecurity (2023)    Hunger Vital Sign     Worried About Running Out of Food in the Last Year: Never true     Ran Out of Food in the Last Year: Never true   Transportation Needs: No Transportation Needs (2023)    PRAPARE - Transportation     Lack of Transportation (Medical): No     Lack of Transportation (Non-Medical):  No   Physical Activity: Sufficiently Active (2021)    Exercise Vital Sign     Days of Exercise per Week: 4 days     Minutes of Exercise per Session: 60 min   Stress: No Stress Concern Present (2021)    [address] [place] [address]     Feeling of Stress : Not at all   Social Connections: Not on file   Intimate Partner Violence: Not on file   Housing Stability: Low Risk  (2023)    Housing Stability Vital Sign     Unable to Pay for Housing in the Last Year: No     Number of Places Lived in the Last Year: 1     Unstable Housing in the Last Year: No      Family History   Problem Relation Age of Onset    Heart attack Father 50    Cancer Father     Heart disease Father         Heart attack    Coronary artery disease Family     Dementia Mother     Heart disease Brother         Bypass surgery    Cancer Brother          of cancer     Past Surgical History:   Procedure Laterality Date    ATHERECTOMY      1 with stent placement  last assessed 6/17/14    CARDIAC CATHETERIZATION      outcome: successful  last assessed 6/17/14    CARDIAC CATHETERIZATION Left 09/06/2023    Procedure: Cardiac Left Heart Cath;  Surgeon: Juani Khan MD;  Location: [address] CATH LAB; Service: Cardiology    CARDIAC CATHETERIZATION N/A 09/06/2023    Procedure: Cardiac Coronary Angiogram;  Surgeon: Juani Khan MD;  Location: [address] CATH LAB; Service: Cardiology    CARDIAC CATHETERIZATION  09/06/2023    Procedure: Cardiac catheterization;  Surgeon: Juani Khan MD;  Location: [address] CATH LAB;   Service: Cardiology    CORONARY ANGIOPLASTY      CORONARY ANGIOPLASTY WITH STENT PLACEMENT      to LAD, diagonal and RCA    CORONARY ARTERY BYPASS GRAFT      MALIGNANT SKIN LESION EXCISION  01/09/2012    Trunk,  SCC chest     IA CORONARY ARTERY BYP W/VEIN & ARTERY GRAFT 3 VEIN N/A 09/20/2023    Procedure: CORONARY ARTERY BYPASS GRAFT (CABG) X2 VESSELS, LIMA - LAD,  LEFT LEG EVH - PDA , W/ CALOS;  Surgeon: Chun Cortez MD;  Location: [facility]IN [facility];  Service: Cardiac Surgery       Current Outpatient Medications:     aspirin 81 MG tablet, Take 1 tablet by mouth daily, Disp: , Rfl:     atorvastatin (LIPITOR) 80 mg tablet, Take 1 tablet (80 mg total) by mouth daily with dinner, Disp: 30 tablet, Rfl: 2    metoprolol tartrate (LOPRESSOR) 25 mg tablet, Take 1 tablet (25 mg total) by mouth every 12 (twelve) hours, Disp: 60 tablet, Rfl: 2    tamsulosin (FLOMAX) 0.4 mg, Take 2 capsules (0.8 mg total) by mouth daily with dinner, Disp: 60 capsule, Rfl: 2  No Known Allergies    Labs:  Lab on 11/01/2023   Component Date Value    Protime 11/01/2023 17.0 (H)     INR 11/01/2023 1.31 (H)    Appointment on 10/23/2023   Component Date Value    Protime 10/23/2023 17.8 (H)     INR 10/23/2023 1.39 (H)    Lab on 10/16/2023   Component Date Value    Protime 10/16/2023 23. 5 (H)     INR 10/16/2023 1.99 (H)    Appointment on 10/09/2023   Component Date Value    Protime 10/09/2023 18.4 (H)     INR 10/09/2023 1.46 (H)     Sodium 10/09/2023 137     Potassium 10/09/2023 4.9     Chloride 10/09/2023 100     CO2 10/09/2023 31     ANION GAP 10/09/2023 6     BUN 10/09/2023 21     Creatinine 10/09/2023 1.15     Glucose 10/09/2023 112     Calcium 10/09/2023 9.2     eGFR 10/09/2023 63    Orders Only on 09/29/2023   Component Date Value    Protime 10/02/2023 17.2 (H)     INR 10/02/2023 1.42 (H)    No results displayed because visit has over 200 results. Lab Requisition on 09/08/2023   Component Date Value    ABO Grouping 09/08/2023 A     Rh Factor 09/08/2023 Negative     Antibody Screen 09/08/2023 Negative     Specimen Expiration Date 09/08/2023 [id number]    Appointment on 09/08/2023   Component Date Value    Sodium 09/08/2023 136     Potassium 09/08/2023 4.1     Chloride 09/08/2023 105     CO2 09/08/2023 24     ANION GAP 09/08/2023 7     BUN 09/08/2023 15     Creatinine 09/08/2023 0.84     Glucose, Fasting 09/08/2023 76     Calcium 09/08/2023 9.8     eGFR 09/08/2023 88     Hemoglobin A1C 09/08/2023 5.4     EAG 09/08/2023 108      Imaging: No results found.     Review of Systems:  Review of Systems  REVIEW OF SYSTEMS:  Constitutional:  Denies fever or chills   Eyes:  Denies change in visual acuity   HENT:  Denies nasal congestion or sore throat   Respiratory:  Denies cough or shortness of breath   Cardiovascular:  Denies chest pain or edema   GI:  Denies abdominal pain, nausea, vomiting, bloody stools or diarrhea   :  Denies dysuria, frequency, difficulty in micturition and nocturia  Musculoskeletal:  Denies back pain or joint pain   Neurologic:  Denies headache, focal weakness or sensory changes   Endocrine:  Denies polyuria or polydipsia   Lymphatic:  Denies swollen glands   Psychiatric:  Denies depression or anxiety    Physical Exam:    /78 (BP Location: Left arm, Patient Position: Sitting, Cuff Size: Standard)   Pulse 61   Resp 16   Ht 5' 10" (1.778 m)   Wt 106 kg (233 lb)   SpO2 99%   BMI 33.43 kg/m²     Physical Exam  PHYSICAL EXAM:  General:  Patient is not in acute distress   Head: Normocephalic, Atraumatic. HEENT:  Both pupils normal-size atraumatic, normocephalic, nonicteric  Neck:  JVP not raised. Trachea central. No carotid bruit  Respiratory:  normal breath sounds no crackles. no rhonchi  Cardiovascular:  Regular rate and rhythm no S3 no murmurs  GI:  Abdomen soft nontender. No organomegaly. Lymphatic:  No cervical or inguinal lymphadenopathy  Neurologic:  Patient is awake alert, oriented . Grossly nonfocal  Extremities no edema    Discussion/Summary:  Patient with multiple medical problems who seems to be doing reasonably well from cardiac standpoint. Previous studies reviewed with patient. Medications reviewed and possible side effects discussed. concepts of cardiovascular disease , signs and symptoms of heart disease. Dietary and risk factor modification reinforced. All questions answered. Safety measures reviewed. Patient advised to report any problems prompting medical attention. Patient had post CABG A-fib and has maintained sinus rhythm. This was discussed with cardiac surgery. Patient had cardiac event monitor which did not show any recurrence of A-fib. We will discontinue Coumadin at this point. Symptoms to watch out from cardiac standpoint which would indicate the need for further cardiac evaluation discussed with patient. Activity restrictions after CABG also discussed. Follow-up in 6 months or earlier as needed. Patient is agreeable with the plan of care.

## 2023-11-20 ENCOUNTER — TELEPHONE (OUTPATIENT)
Dept: CARDIOLOGY CLINIC | Facility: CLINIC | Age: 73
End: 2023-11-20

## 2023-11-20 DIAGNOSIS — I25.10 CORONARY ARTERY DISEASE INVOLVING NATIVE CORONARY ARTERY OF NATIVE HEART WITHOUT ANGINA PECTORIS: ICD-10-CM

## 2023-11-20 DIAGNOSIS — Z95.1 S/P CABG X 2: ICD-10-CM

## 2023-11-20 DIAGNOSIS — I48.0 PAROXYSMAL A-FIB (HCC): ICD-10-CM

## 2023-11-20 RX ORDER — ATORVASTATIN CALCIUM 80 MG/1
80 TABLET, FILM COATED ORAL
Qty: 90 TABLET | Refills: 3 | Status: SHIPPED | OUTPATIENT
Start: 2023-11-20 | End: 2023-11-21 | Stop reason: SDUPTHER

## 2023-11-20 NOTE — TELEPHONE ENCOUNTER
Patient left a vm. would like a callback to discuss extended his refills on all his medications.    Please call [phone]

## 2023-11-20 NOTE — TELEPHONE ENCOUNTER
I s/w the pt's and his wife. She just wanted to ask if she can test earlier since they will be away on her test date and I said that was fine. She will test 11/30 and I updated that on her coag visit.

## 2023-11-20 NOTE — TELEPHONE ENCOUNTER
Spoke to pt and he was inquiring if he should continue taking Flomax. I informed pt I would forward question to PCP.    Pt would also like 90 day supply sent to retail on file:  Lipitor  Metoprolol    Medication sent to retail on file.    Pt also would like to speak to Gloria MCCRAY

## 2023-11-21 RX ORDER — ATORVASTATIN CALCIUM 80 MG/1
80 TABLET, FILM COATED ORAL
Qty: 90 TABLET | Refills: 3 | Status: SHIPPED | OUTPATIENT
Start: 2023-11-21 | End: 2024-11-15

## 2023-11-21 RX ORDER — TAMSULOSIN HYDROCHLORIDE 0.4 MG/1
0.8 CAPSULE ORAL
Qty: 60 CAPSULE | Refills: 2 | Status: SHIPPED | OUTPATIENT
Start: 2023-11-21 | End: 2023-12-18

## 2023-12-16 DIAGNOSIS — I48.0 PAROXYSMAL A-FIB (HCC): ICD-10-CM

## 2023-12-16 DIAGNOSIS — Z95.1 S/P CABG X 2: ICD-10-CM

## 2023-12-16 DIAGNOSIS — I25.10 CORONARY ARTERY DISEASE INVOLVING NATIVE CORONARY ARTERY OF NATIVE HEART WITHOUT ANGINA PECTORIS: ICD-10-CM

## 2023-12-18 RX ORDER — TAMSULOSIN HYDROCHLORIDE 0.4 MG/1
0.8 CAPSULE ORAL
Qty: 180 CAPSULE | Refills: 1 | Status: SHIPPED | OUTPATIENT
Start: 2023-12-18

## 2024-02-21 PROBLEM — Z13.89 SCREENING FOR SKIN CONDITION: Status: RESOLVED | Noted: 2018-08-16 | Resolved: 2024-02-21

## 2024-05-01 ENCOUNTER — OFFICE VISIT (OUTPATIENT)
Age: 74
End: 2024-05-01
Payer: MEDICARE

## 2024-05-01 VITALS — WEIGHT: 230 LBS | TEMPERATURE: 97.5 F | HEIGHT: 70 IN | BODY MASS INDEX: 32.93 KG/M2

## 2024-05-01 DIAGNOSIS — D22.9 MULTIPLE MELANOCYTIC NEVI: ICD-10-CM

## 2024-05-01 DIAGNOSIS — D18.01 CHERRY ANGIOMA: ICD-10-CM

## 2024-05-01 DIAGNOSIS — L57.0 KERATOSIS, ACTINIC: Primary | ICD-10-CM

## 2024-05-01 DIAGNOSIS — Z86.007 HISTORY OF SQUAMOUS CELL CARCINOMA IN SITU: ICD-10-CM

## 2024-05-01 DIAGNOSIS — L82.1 SEBORRHEIC KERATOSIS: ICD-10-CM

## 2024-05-01 PROCEDURE — 17003 DESTRUCT PREMALG LES 2-14: CPT | Performed by: DERMATOLOGY

## 2024-05-01 PROCEDURE — 17000 DESTRUCT PREMALG LESION: CPT | Performed by: DERMATOLOGY

## 2024-05-01 PROCEDURE — 99213 OFFICE O/P EST LOW 20 MIN: CPT | Performed by: DERMATOLOGY

## 2024-05-01 NOTE — PROGRESS NOTES
"[facility] Dermatology Clinic Note     Patient Name: Jeovanny Monsalve  Encounter Date: 5/1/24     Have you been cared for by a [facility] Dermatologist in the last 3 years and, if so, which description applies to you?    Yes.  I have been here within the last 3 years, and my medical history has NOT changed since that time.  I am MALE/not capable of bearing children.    REVIEW OF SYSTEMS:  Have you recently had or currently have any of the following? No changes in my recent health.   PAST MEDICAL HISTORY:  Have you personally ever had or currently have any of the following?  If \"YES,\" then please provide more detail. No changes in my medical history.   HISTORY OF IMMUNOSUPPRESSION: Do you have a history of any of the following:  Systemic Immunosuppression such as Diabetes, Biologic or Immunotherapy, Chemotherapy, Organ Transplantation, Bone Marrow Transplantation?  No     Answering \"YES\" requires the addition of the dotphrase \"IMMUNOSUPPRESSED\" as the first diagnosis of the patient's visit.   FAMILY HISTORY:  Any \"first degree relatives\" (parent, brother, sister, or child) with the following?    No changes in my family's known health.   PATIENT EXPERIENCE:    Do you want the Dermatologist to perform a COMPLETE skin exam today including a clinical examination under the \"bra and underwear\" areas?  Yes  If necessary, do we have your permission to call and leave a detailed message on your Preferred Phone number that includes your specific medical information?  Yes      No Known Allergies   Current Outpatient Medications:     aspirin 81 MG tablet, Take 1 tablet by mouth daily, Disp: , Rfl:     atorvastatin (LIPITOR) 80 mg tablet, Take 1 tablet (80 mg total) by mouth daily with dinner, Disp: 90 tablet, Rfl: 3    metoprolol tartrate (LOPRESSOR) 25 mg tablet, Take 1 tablet (25 mg total) by mouth every 12 (twelve) hours, Disp: 180 tablet, Rfl: 3    tamsulosin (FLOMAX) 0.4 mg, TAKE 2 CAPSULES BY MOUTH DAILY WITH DINNER.., Disp: " 180 capsule, Rfl: 1        Patient present for 6 month skin check, no spots of concern, previous history of skin cancer.    Whom besides the patient is providing clinical information about today's encounter?   NO ADDITIONAL HISTORIAN (patient alone provided history)    Physical Exam and Assessment/Plan by Diagnosis:    HISTORY OF NON MELANOMA     Physical Exam:  Anatomic Location Affected:  Chest area-records not available.  Morphological Description of scar:  scar well healed  Suspected Recurrence: No     Additional History of Present Condition:  History of non melanoma with no sign of recurrence     Assessment and Plan:  Based on a thorough discussion of this condition and the management approach to it (including a comprehensive discussion of the known risks, side effects and potential benefits of treatment), the patient (family) agrees to implement the following specific plan:  Monitor for change  Sun avoidance, protective clothing (known as UPF clothing), and the use of at least SPF 30 sunscreens is advised. Sunscreen should be reapplied every two hours when outside.      HISTORY OF SQUAMOUS CELL CARCINOMA      Physical Exam:  Anatomic Location Affected:  left back in 10/2022  Morphological Description of Scar:  scar well healed  Suspected Recurrence: no  Regional adenopathy: no     Additional History of Present Condition:  history of squamous cell carcinoma with no sign of recurrence.      Assessment and Plan:  Based on a thorough discussion of this condition and the management approach to it (including a comprehensive discussion of the known risks, side effects and potential benefits of treatment), the patient (family) agrees to implement the following specific plan:  Monitor for change  Sun avoidance, protective clothing (known as UPF clothing), and the use of at least SPF 30 sunscreens is advised. Sunscreen should be reapplied every two hours when outside.        ACTINIC KERATOSIS    Physical Exam:  Anatomic  "Location: face, dorsum of left hand  Morphologic Description: Scaly pink papules  Pertinent Positives:  Pertinent Negatives:    Additional History of Present Condition:  Present upon skin exam  History of bleeding from this lesion? NO  History of pain, tenderness, and/or itching within this lesion? NO  Personal history of skin cancer? YES, SCC        Physical Exam  Constitutional:        HENT:      Head:            Plan:  Cryotherapy performed in the office (See Procedure Note). We discussed that a hypopigmentation or scar may form in the region following cryotherapy.  We discussed the pre-cancerous nature of the condition. Actinic keratosis is found on sun-damaged skin and there is small risk that the condition could turn into a skin cancer called squamous cell carcinoma. There is no risk of actinic keratosis turning into melanoma.  We discussed sun protection measures, including using sunscreen with an SPF 50+ year round, avoiding the sun, and wearing protective clothing such as long sleeves and pants when out in the sun.  Continue to monitor clinically for signs of recurrence. Discussed with patient the importance of keeping up to date with full body skin exams.     PROCEDURES PERFORMED TODAY ASSOCIATED WITH THIS CONDITION:          Cryotherapy: PROCEDURE:  DESTRUCTION OF PRE-MALIGNANT LESIONS  After a thorough discussion of treatment options and risk/benefits/alternatives (including but not limited to local pain, scarring, dyspigmentation, blistering, and possible superinfection), verbal and written consent were obtained and the aforementioned lesions were treated on with cryotherapy using liquid nitrogen x 1 cycle for 5-10 seconds.    TOTAL NUMBER of 4  pre-malignant lesions were treated today on the ANATOMIC LOCATION: face and left dorsum of hand.     The patient tolerated the procedure well, and after-care instructions were provided.          MELANOCYTIC NEVI (\"Moles\")    Physical Exam:  Anatomic Location " "Affected: Mostly on sun-exposed areas of the trunk and extremities  Morphological Description:  Scattered, 1-4mm round to ovoid, symmetrical-appearing, even bordered, skin colored to dark brown macules/papules, mostly in sun-exposed areas  Pertinent Positives:  Pertinent Negatives:    Additional History of Present Condition:  Normal looking moles present for years    Assessment and Plan:  Based on a thorough discussion of this condition and the management approach to it (including a comprehensive discussion of the known risks, side effects and potential benefits of treatment), the patient (family) agrees to implement the following specific plan:  Provided handout with information regarding the ABCDE's of moles   Recommend routine skin exams every 6 months   Sun avoidance, protective clothing (known as UPF clothing), and the use of at least SPF 30 sunscreens is advised. Sunscreen should be reapplied every two hours when outside.       SEBORRHEIC KERATOSIS; NON-INFLAMED    Physical Exam:  Anatomic Location Affected:  scattered across trunk, extremities,  face  Morphological Description:  Flat and raised, waxy, smooth to warty textured, yellow to brownish-grey to dark brown to blackish, discrete, \"stuck-on\" appearing papules.  Pertinent Positives:  Pertinent Negatives:    Additional History of Present Condition:  Patient reports new bumps on the skin.  Denies itch, burn, pain, bleeding or ulceration.  Present constantly; nothing seems to make it worse or better.  No prior treatment.      Assessment and Plan:  Based on a thorough discussion of this condition and the management approach to it (including a comprehensive discussion of the known risks, side effects and potential benefits of treatment), the patient (family) agrees to implement the following specific plan:  Reassured benign        ANGIOMA (\"CHERRY ANGIOMA\")    Physical Exam:  Anatomic Location: scattered across sun exposed areas of the trunk and extremities "   Morphologic Description: Firm red to reddish-blue discrete papules  Pertinent Positives:  Pertinent Negatives:    Additional History of Present Condition:  Present on exam.     Assessment and Plan:  Reassured benign        Scribe Attestation      I,:  Key De Leon am acting as a scribe while in the presence of the attending physician.:       I,:  Mani Hauser MD personally performed the services described in this documentation    as scribed in my presence.:

## 2024-05-02 ENCOUNTER — TELEPHONE (OUTPATIENT)
Age: 74
End: 2024-05-02

## 2024-06-21 ENCOUNTER — OFFICE VISIT (OUTPATIENT)
Dept: CARDIOLOGY CLINIC | Facility: CLINIC | Age: 74
End: 2024-06-21
Payer: MEDICARE

## 2024-06-21 VITALS
DIASTOLIC BLOOD PRESSURE: 80 MMHG | RESPIRATION RATE: 16 BRPM | HEIGHT: 70 IN | BODY MASS INDEX: 42.8 KG/M2 | OXYGEN SATURATION: 97 % | HEART RATE: 76 BPM | WEIGHT: 299 LBS | SYSTOLIC BLOOD PRESSURE: 118 MMHG

## 2024-06-21 DIAGNOSIS — E78.2 COMBINED HYPERLIPIDEMIA: ICD-10-CM

## 2024-06-21 DIAGNOSIS — I25.5 ISCHEMIC CARDIOMYOPATHY: ICD-10-CM

## 2024-06-21 DIAGNOSIS — Z95.1 S/P CABG X 2: ICD-10-CM

## 2024-06-21 DIAGNOSIS — Z12.11 ENCOUNTER FOR SCREENING COLONOSCOPY: ICD-10-CM

## 2024-06-21 DIAGNOSIS — I10 HYPERTENSION, ESSENTIAL: ICD-10-CM

## 2024-06-21 DIAGNOSIS — I25.10 CORONARY ARTERY DISEASE INVOLVING NATIVE CORONARY ARTERY OF NATIVE HEART WITHOUT ANGINA PECTORIS: Primary | ICD-10-CM

## 2024-06-21 PROCEDURE — 99214 OFFICE O/P EST MOD 30 MIN: CPT

## 2024-06-21 RX ORDER — METOPROLOL SUCCINATE 25 MG/1
25 TABLET, EXTENDED RELEASE ORAL DAILY
Qty: 90 TABLET | Refills: 1 | Status: SHIPPED | OUTPATIENT
Start: 2024-06-21

## 2024-06-21 NOTE — PROGRESS NOTES
[facility] Cardiology   Office Visit    Jeovanny Monsalve 73 y.o. male MRN: [id number]    06/21/24        Assessment/Plan:  CAD s/p CABG x2 (LIMA-LAD, SVG-RPDA) on 9/2023 and multiple PCI (LAD 2000, D1 2007, RCA 2010)  Postoperative atrial fibrillation  ICM with EF 40%  Hypertension  Hyperlipidemia  ERIC      Denies exertional chest pain/SOB.  Continues to experience minimal sternotomy site discomfort since undergoing CABG associated with positional changes/movement such as rolling over or doing tricep dips.  Advised to discuss with CT surgery office if he experiences persistent/worsening symptoms.    Will repeat TTE to evaluate LVEF, wall motion, and assess for structural abnormalities.  Transition to Toprol-XL 25 mg daily from Lopressor given ICM.  Continue ASA and Lipitor.  CBC, BMP, and lipid panel ordered for periodic surveillance.    Patient had postoperative A-fib following CABG.  Cardiac event monitor was negative for evidence of recurrence so AC was previously discontinued.    BP is well-controlled.  Encourage low-sodium diet and ambulatory monitoring.    Advised to notify our office or visit ED for any new/worsening symptoms.        Interval history: Jeovanny Monsalve is a 73 y.o. year old male with history of CAD s/p CABG x 2 and multiple PCI, postoperative atrial fibrillation, ICM with EF 40%, hypertension, hyperlipidemia, and ERIC who presents for office visit.  Patient reports he has been fairly well overall from a cardiac standpoint since time of last visit with cardiology.  His only complaint today is that he continues to experience minimal sternotomy site discomfort since undergoing CABG which is associated with positional changes/movement such as rolling over or doing tricep dips.  Patient states this is not pain from his heart, it is pain from sternotomy site.  It is rated as 1-2 out of 10 and feels completely different than when intervention was previously required.  Patient tries to remain very active  "and enjoys working out by lifting weights on a routine basis.  Endorses strict compliance to all medications.  Denies any additional complaints or concerns for today.  Follows with Dr. Prado as primary cardiologist.      Review of Systems:  Review of Systems   Constitutional:  Negative for chills and fever.   HENT:  Negative for ear pain and sore throat.    Eyes:  Negative for pain and visual disturbance.   Respiratory:  Negative for cough, chest tightness and shortness of breath.    Cardiovascular:  Negative for palpitations and leg swelling.   Gastrointestinal:  Negative for abdominal pain and vomiting.   Genitourinary:  Negative for dysuria and hematuria.   Musculoskeletal:  Negative for arthralgias and back pain.   Skin:  Negative for color change and rash.   Neurological:  Negative for seizures and syncope.   All other systems reviewed and are negative.      PHYSICAL EXAM:  Vitals:   Vitals:    06/21/24 1135   BP: 118/80   BP Location: Left arm   Patient Position: Sitting   Cuff Size: Standard   Pulse: 76   Resp: 16   SpO2: 97%   Weight: 136 kg (299 lb)   Height: 5' 10\" (1.778 m)        Physical Exam:  GEN: Alert and oriented x 3, in no acute distress.  Well appearing and well nourished.   HEENT: Sclera anicteric, conjunctivae pink, mucous membranes moist. Oropharynx clear.   NECK: Supple, no carotid bruits, no significant JVD. Trachea midline, no thyromegaly.   HEART: Regular rhythm, normal S1 and S2, no murmurs, clicks, gallops or rubs. PMI nondisplaced, no thrills.   LUNGS: Clear to auscultation bilaterally; no wheezes, rales, or rhonchi. No increased work of breathing or signs of respiratory distress.   ABDOMEN: Soft, nontender, nondistended, normoactive bowel sounds.   EXTREMITIES: Skin warm and well perfused, no clubbing, cyanosis, or edema.  NEURO: No focal findings. Normal speech. Mood and affect normal.   SKIN: Normal without suspicious lesions on exposed skin.    Follow up: 6 months or sooner as " needed      No Known Allergies      Current Outpatient Medications:     aspirin 81 MG tablet, Take 1 tablet by mouth daily, Disp: , Rfl:     atorvastatin (LIPITOR) 80 mg tablet, Take 1 tablet (80 mg total) by mouth daily with dinner, Disp: 90 tablet, Rfl: 3    metoprolol succinate (TOPROL-XL) 25 mg 24 hr tablet, Take 1 tablet (25 mg total) by mouth daily, Disp: 90 tablet, Rfl: 1    tamsulosin (FLOMAX) 0.4 mg, TAKE 2 CAPSULES BY MOUTH DAILY WITH DINNER.., Disp: 180 capsule, Rfl: 1    Past Medical History:   Diagnosis Date    Abnormal LFTs     Benign neoplasm of skin     Cancer (HCC)     Chronic kidney disease     Hypertension     Lumbar canal stenosis     with neurogenic claudication     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, congenital     Myocardial infarction (HCC)     Nephrotic syndrome     Nonmelanoma skin cancer     last assessed 18    Old myocardial infarction     Skin cancer     last assessed 14    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        Family History   Problem Relation Age of Onset    Heart attack Father 48    Cancer Father     Heart disease Father         Heart attack    Coronary artery disease Family     Dementia Mother     Heart disease Brother         Bypass surgery    Cancer Brother          of cancer       Past Medical History:   Diagnosis Date    Abnormal LFTs     Benign neoplasm of skin     Cancer (HCC)     Chronic kidney disease     Hypertension     Lumbar canal stenosis     with neurogenic claudication     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, congenital     Myocardial infarction (HCC)     Nephrotic syndrome     Nonmelanoma skin cancer     last assessed 18    Old myocardial infarction     Skin cancer     last assessed 14    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        Past Surgical History:   Procedure Laterality Date    ATHERECTOMY      1 with stent placement  last assessed 14    CARDIAC CATHETERIZATION      outcome: successful  last assessed 14    CARDIAC CATHETERIZATION Left  09/06/2023    Procedure: Cardiac Left Heart Cath;  Surgeon: Татьяна Mendoza MD;  Location: [facility];  Service: Cardiology    CARDIAC CATHETERIZATION N/A 09/06/2023    Procedure: Cardiac Coronary Angiogram;  Surgeon: Татьяна Mendoza MD;  Location: [facility];  Service: Cardiology    CARDIAC CATHETERIZATION  09/06/2023    Procedure: Cardiac catheterization;  Surgeon: Татьяна Mendoza MD;  Location: [facility];  Service: Cardiology    CORONARY ANGIOPLASTY      CORONARY ANGIOPLASTY WITH STENT PLACEMENT      to LAD, diagonal and RCA    CORONARY ARTERY BYPASS GRAFT      MALIGNANT SKIN LESION EXCISION  01/09/2012    Trunk,  SCC chest     [facility] CORONARY ARTERY BYP W/VEIN & ARTERY GRAFT 3 VEIN N/A 09/20/2023    Procedure: CORONARY ARTERY BYPASS GRAFT (CABG) X2 VESSELS, LIMA - LAD,  LEFT LEG EVH - PDA , W/ CALOS;  Surgeon: ANIKA Christianson MD;  Location: [facility];  Service: Cardiac Surgery       Social History     Socioeconomic History    Marital status: /Civil Union     Spouse name: Not on file    Number of children: Not on file    Years of education: Not on file    Highest education level: Not on file   Occupational History    Occupation: retired HS     Tobacco Use    Smoking status: Never    Smokeless tobacco: Never   Vaping Use    Vaping status: Never Used   Substance and Sexual Activity    Alcohol use: Not Currently     Alcohol/week: 1.0 standard drink of alcohol     Types: 1 Glasses of wine per week     Comment: Wine consumption (__glasses/day)     Drug use: No    Sexual activity: Not Currently     Birth control/protection: None   Other Topics Concern    Not on file   Social History Narrative    DME: CPAP    Living independently with spouse     No advance directives     Social Determinants of Health     Financial Resource Strain: Not on file   Food Insecurity: No Food Insecurity (9/21/2023)    Hunger Vital Sign     Worried About Running Out of Food in the Last  Year: Never true     Ran Out of Food in the Last Year: Never true   Transportation Needs: No Transportation Needs (9/21/2023)    PRAPARE - Transportation     Lack of Transportation (Medical): No     Lack of Transportation (Non-Medical): No   Physical Activity: Sufficiently Active (2/23/2021)    Exercise Vital Sign     Days of Exercise per Week: 4 days     Minutes of Exercise per Session: 60 min   Stress: No Stress Concern Present (2/23/2021)    [employer] [place] [employer] - Occupational Stress Questionnaire     Feeling of Stress : Not at all   Social Connections: Not on file   Intimate Partner Violence: Not on file   Housing Stability: Low Risk  (9/21/2023)    Housing Stability Vital Sign     Unable to Pay for Housing in the Last Year: No     Number of Times Moved in the Last Year: 1     Homeless in the Last Year: No             LABORATORY RESULTS:    Lab Results   Component Value Date    WBC 8.64 09/24/2023    HGB 11.5 (L) 09/24/2023    HCT 32.7 (L) 09/24/2023    MCV 90 09/24/2023     (L) 09/24/2023     Lab Results   Component Value Date    GLUCOSE 142 (H) 09/20/2023    CALCIUM 9.2 10/09/2023     10/06/2015    K 4.9 10/09/2023    CO2 31 10/09/2023     10/09/2023    BUN 21 10/09/2023    CREATININE 1.15 10/09/2023     Lab Results   Component Value Date    HGBA1C 5.4 09/08/2023       Lipid Profile:   Lab Results   Component Value Date    CHOL 149 10/06/2015     Lab Results   Component Value Date    HDL 43 06/30/2023    HDL 43 01/12/2022    HDL 45 07/13/2021     Lab Results   Component Value Date    LDLCALC 83 06/30/2023    LDLCALC 106 (H) 01/12/2022    LDLCALC 86 07/13/2021     Lab Results   Component Value Date    TRIG 70 06/30/2023    TRIG 120 01/12/2022    TRIG 118 07/13/2021       The ASCVD Risk score (Edinson OFX, et al., 2019) failed to calculate for the following reasons:    The patient has a prior MI or stroke diagnosis    1. Coronary artery disease involving native coronary  artery of native heart without angina pectoris  CBC and differential    Basic metabolic panel    Lipid Panel with Direct LDL reflex    metoprolol succinate (TOPROL-XL) 25 mg 24 hr tablet      2. Ischemic cardiomyopathy  CBC and differential    Basic metabolic panel    Lipid Panel with Direct LDL reflex    metoprolol succinate (TOPROL-XL) 25 mg 24 hr tablet    Echo complete w/ contrast if indicated      3. S/P CABG x 2        4. Hypertension, essential        5. Combined hyperlipidemia  Lipid Panel with Direct LDL reflex      6. Encounter for screening colonoscopy  Ambulatory referral for colon cancer education          Imaging: I have personally reviewed pertinent reports.      Recommend aggressive risk factor modification and therapeutic lifestyle changes.  Low-salt, low-calorie, low-fat, low-cholesterol diet with regular exercise and to optimize weight.    Discussed concepts of atherosclerosis, including signs and symptoms of cardiac disease.    Medications reviewed and possible side effects discussed.  Previous studies were reviewed.    Safety measures were reviewed.  All questions and concerns addressed.  Patient was advised to report any problems requiring medical attention.    Follow-up with PCP and appropriate specialist and lab work as discussed.    Return for follow up visit as scheduled or earlier, if needed.  Thank you for allowing me to participate in the care and evaluation of your patient.  Should you have any questions, please feel free to contact me.    Price Vasquez PA-C  6/21/2024,1:38 PM

## 2024-07-01 DIAGNOSIS — Z95.1 S/P CABG X 2: ICD-10-CM

## 2024-07-01 DIAGNOSIS — I48.0 PAROXYSMAL A-FIB (HCC): ICD-10-CM

## 2024-07-01 DIAGNOSIS — I25.10 CORONARY ARTERY DISEASE INVOLVING NATIVE CORONARY ARTERY OF NATIVE HEART WITHOUT ANGINA PECTORIS: ICD-10-CM

## 2024-07-01 RX ORDER — TAMSULOSIN HYDROCHLORIDE 0.4 MG/1
0.8 CAPSULE ORAL
Qty: 200 CAPSULE | Refills: 1 | Status: SHIPPED | OUTPATIENT
Start: 2024-07-01

## 2024-07-09 ENCOUNTER — HOSPITAL ENCOUNTER (OUTPATIENT)
Dept: NON INVASIVE DIAGNOSTICS | Facility: CLINIC | Age: 74
Discharge: HOME | End: 2024-07-09
Payer: MEDICARE

## 2024-07-09 VITALS
HEIGHT: 70 IN | BODY MASS INDEX: 32.78 KG/M2 | WEIGHT: 229 LBS | SYSTOLIC BLOOD PRESSURE: 118 MMHG | HEART RATE: 70 BPM | DIASTOLIC BLOOD PRESSURE: 80 MMHG

## 2024-07-09 DIAGNOSIS — I25.5 ISCHEMIC CARDIOMYOPATHY: ICD-10-CM

## 2024-07-09 LAB
AORTIC ROOT: 3.5 CM
APICAL FOUR CHAMBER EJECTION FRACTION: 32 %
ASCENDING AORTA: 3.3 CM
BSA FOR ECHO PROCEDURE: 2.21 M2
E WAVE DECELERATION TIME: 155 MS
E/A RATIO: 0.96
FRACTIONAL SHORTENING: 31 (ref 28–44)
INTERVENTRICULAR SEPTUM IN DIASTOLE (PARASTERNAL SHORT AXIS VIEW): 1 CM
INTERVENTRICULAR SEPTUM: 1 CM (ref 0.6–1.1)
LAAS-AP2: 19.9 CM2
LAAS-AP4: 26.7 CM2
LEFT ATRIUM SIZE: 4.9 CM
LEFT ATRIUM VOLUME (MOD BIPLANE): 79 ML
LEFT ATRIUM VOLUME INDEX (MOD BIPLANE): 31.9 ML/M2
LEFT INTERNAL DIMENSION IN SYSTOLE: 3.8 CM (ref 2.1–4)
LEFT VENTRICULAR INTERNAL DIMENSION IN DIASTOLE: 5.5 CM (ref 3.5–6)
LEFT VENTRICULAR POSTERIOR WALL IN END DIASTOLE: 1.1 CM
LEFT VENTRICULAR STROKE VOLUME: 87 ML
LVSV (TEICH): 87 ML
MITRAL REGURGITATION PEAK VELOCITY: 4.59 M/S
MITRAL VALVE MEAN INFLOW VELOCITY: 3.51 M/S
MITRAL VALVE REGURGITANT PEAK GRADIENT: 84 MMHG
MV E'TISSUE VEL-SEP: 7 CM/S
MV PEAK A VEL: 0.5 M/S
MV PEAK E VEL: 48 CM/S
MV STENOSIS PRESSURE HALF TIME: 45 MS
MV VALVE AREA P 1/2 METHOD: 4.89
RIGHT ATRIUM AREA SYSTOLE A4C: 18.7 CM2
RIGHT VENTRICLE ID DIMENSION: 3.5 CM
SL CV DOP CALC MV VTI RETROGRADE: 186.9 CM
SL CV LEFT ATRIUM LENGTH A2C: 5.4 CM
SL CV LV EF: 40
SL CV MV MEAN GRADIENT RETROGRADE: 58 MMHG
SL CV PED ECHO LEFT VENTRICLE DIASTOLIC VOLUME (MOD BIPLANE) 2D: 148 ML
SL CV PED ECHO LEFT VENTRICLE SYSTOLIC VOLUME (MOD BIPLANE) 2D: 61 ML
TRICUSPID ANNULAR PLANE SYSTOLIC EXCURSION: 1.5 CM

## 2024-07-09 PROCEDURE — 93306 TTE W/DOPPLER COMPLETE: CPT

## 2024-07-09 PROCEDURE — 93306 TTE W/DOPPLER COMPLETE: CPT | Performed by: INTERNAL MEDICINE

## 2024-07-10 ENCOUNTER — TELEPHONE (OUTPATIENT)
Dept: CARDIOLOGY CLINIC | Facility: CLINIC | Age: 74
End: 2024-07-10

## 2024-07-10 ENCOUNTER — TELEPHONE (OUTPATIENT)
Age: 74
End: 2024-07-10

## 2024-07-10 NOTE — TELEPHONE ENCOUNTER
Scheduled date of colonoscopy (as of today):  09.10.2024    Physician performing colonoscopy:  Dr. Grimaldo    Location of colonoscopy:  [place]    Bowel prep reviewed with patient:  Miralax/Dulucolax

## 2024-07-10 NOTE — TELEPHONE ENCOUNTER
----- Message from Price Vasquez PA-C sent at 7/9/2024  3:21 PM EDT -----  Please call patient to advise echocardiogram overall appears stable.  His heart pump function is 40% which is similar to his prior sudies (previously 40 and 45%).  These results can be discussed in more detail at next office visit.  Thank you.

## 2024-07-10 NOTE — TELEPHONE ENCOUNTER
COLONOSCOPY  MIRALAX/Dulcolax Bowel Preparation Instructions    The OR/GI Lab will contact you the evening prior to your procedure with your exact arrival time.    Our practice requires a 1 week notice for any cancellations or rescheduling. We kindly ask that you immediately notify us of any changes including any new medications that are prescribed. Thank you for your cooperation.     WEEK BEFORE YOUR PROCEDURE:  Stop taking Iron tablets.  5 days prior, AVOID vegetables and fruits with skins or seeds, nuts, corn, popcorn and whole grain breads.   Purchase: One (1) 238-gram container of Miralax (polyethylene glycol 3350), four (4) 5 mg Dulcolax (bisacodyl) tablets, and one (1) 64-ounce bottle of Gatorade (sports drink) - no red, orange, or purple. These may be purchased at any pharmacy without a prescription. Generic products are permissible.   Arrange responsible transportation for day of the procedure.     DAY BEFORE THE PROCEDURE:   CLEAR liquids only for entire day prior. Nothing red, orange or purple.    You MAY have:                                                               Soda  Water  Broth Gatorade  Jello  Popsicles Coffee/tea without milk/creamer     YOU MAY NOT HAVE:  Solid foods   Milk and milk products    Juice with pulp    BOWEL PREPARATION:  Includes: One (1) 238-gram container of Miralax (polyethylene glycol 3350), four (4) 5 mg Dulcolax (bisacodyl) tablets, and one (1) 64-ounce bottle of Gatorade (sports drink).  Preparation may be refrigerated.  Entire bowel prep should be completed.     Afternoon before the procedure (2:00 pm - 5:00 pm):    Take two (2) 5 mg Dulcolax laxative tablets.     Evening before the procedure (6:00 pm):  Mix entire container of Miralax with one (1) 64-ounce bottle of Gatorade and shake until all medication is dissolved.   Begin drinking solution. Drink an eight (8) ounce cup every 10-15 minutes until you have consumed half (32 ounces) of the solution.  Refrigerate  remaining solution.    Night before the procedure (8:00 pm):  Take two (2) 5 mg Dulcolax laxative tablets.     Beginning 5 hours before your procedure:  Drink the remaining amount of prepared solution (32 ounces).  Drink an eight (8) ounce cup every 10-15 minutes until you have consumed the remaining solution.     Bowel prep should be completed 4 hours prior to procedure time.    NOTHING TO EAT OR DRINK AFTER MIDNIGHT- EXCEPT FOR YOUR PREP    DAY OF THE PROCEDURE:  You may brush your teeth.  Leave all jewelry at home.  Please arrive for your procedure as indicated by the OR / GI Lab / Endoscopy Unit. The hospital will contact you the day before with your exact arrival time.   Make sure you have arranged ahead of time for a responsible adult (18 or older) to accompany and drive you home after the procedure.  Please discuss any transportation concerns with our staff prior to your procedure.    The effects of the anesthesia can persist for 24 hours.  After receiving the sedation, you must exercise caution before engaging in any activity that could harm yourself and others (such as driving a car).  Do not make any important decisions or do not drink any alcoholic beverages during this time period.  After your procedure, you may have anything you'd like to eat or drink.  You will probably want to start with something light.  Please include plenty of fluids.  Avoid items that cause gas such as sodas and salads.    SPECIAL INSTRUCTIONS:    For patients currently taking blood thinners and/or antiplatelet therapy our office will contact the prescribing provider.  Our office will contact you with any required changes to your medication regimen.     Blood thinner (i.e. - Coumadin, Pradaxa, Lovenox, Xarelto, Eliquis)  ?  Continue (Do Not Stop)  ? Stop______________for_____________days prior to the procedure.    Antiplatelet (i.e. - Plavix, Aggrenox, Effient, Brilinta)  ?  Continue (Do Not  Stop)  ? Stop______________for_____________days prior to the procedure.       Diabetes:   If you are Diabetic, please see separate Diabetic Instruction Sheet.          Prescribed medications:  Do not stop your aspirin, or any of your other medications (unless instructed otherwise).    Take the rest of your prescribed medications with small sips of water at least 2 hours prior to your procedure.      For any questions or concerns related to your bowel preparation or pre-procedure instructions, please contact our office at [phone].  Thank you for choosing [facility]!

## 2024-08-19 DIAGNOSIS — Z95.1 S/P CABG X 2: ICD-10-CM

## 2024-08-19 DIAGNOSIS — I25.10 CORONARY ARTERY DISEASE INVOLVING NATIVE CORONARY ARTERY OF NATIVE HEART WITHOUT ANGINA PECTORIS: ICD-10-CM

## 2024-08-19 DIAGNOSIS — I48.0 PAROXYSMAL A-FIB (HCC): ICD-10-CM

## 2024-08-19 RX ORDER — ATORVASTATIN CALCIUM 80 MG/1
80 TABLET, FILM COATED ORAL
Qty: 30 TABLET | Refills: 0 | Status: SHIPPED | OUTPATIENT
Start: 2024-08-19

## 2024-09-05 ENCOUNTER — PREP FOR PROCEDURE (OUTPATIENT)
Age: 74
End: 2024-09-05

## 2024-09-10 ENCOUNTER — ANESTHESIA (OUTPATIENT)
Dept: GASTROENTEROLOGY | Facility: HOSPITAL | Age: 74
End: 2024-09-10
Payer: MEDICARE

## 2024-09-10 ENCOUNTER — HOSPITAL ENCOUNTER (OUTPATIENT)
Dept: GASTROENTEROLOGY | Facility: HOSPITAL | Age: 74
Setting detail: OUTPATIENT SURGERY
Discharge: HOME | End: 2024-09-10
Attending: INTERNAL MEDICINE
Payer: MEDICARE

## 2024-09-10 ENCOUNTER — ANESTHESIA EVENT (OUTPATIENT)
Dept: GASTROENTEROLOGY | Facility: HOSPITAL | Age: 74
End: 2024-09-10
Payer: MEDICARE

## 2024-09-10 VITALS
SYSTOLIC BLOOD PRESSURE: 107 MMHG | HEART RATE: 73 BPM | TEMPERATURE: 97.4 F | HEIGHT: 70 IN | RESPIRATION RATE: 18 BRPM | OXYGEN SATURATION: 100 % | BODY MASS INDEX: 32.57 KG/M2 | WEIGHT: 227.51 LBS | DIASTOLIC BLOOD PRESSURE: 68 MMHG

## 2024-09-10 DIAGNOSIS — Z12.11 SCREENING FOR COLON CANCER: ICD-10-CM

## 2024-09-10 PROCEDURE — G0121 COLON CA SCRN NOT HI RSK IND: HCPCS | Performed by: INTERNAL MEDICINE

## 2024-09-10 RX ORDER — SODIUM CHLORIDE, SODIUM LACTATE, POTASSIUM CHLORIDE, CALCIUM CHLORIDE 600; 310; 30; 20 MG/100ML; MG/100ML; MG/100ML; MG/100ML
INJECTION, SOLUTION INTRAVENOUS CONTINUOUS PRN
Status: DISCONTINUED | OUTPATIENT
Start: 2024-09-10 | End: 2024-09-10

## 2024-09-10 RX ORDER — LIDOCAINE HYDROCHLORIDE 10 MG/ML
INJECTION, SOLUTION EPIDURAL; INFILTRATION; INTRACAUDAL; PERINEURAL AS NEEDED
Status: DISCONTINUED | OUTPATIENT
Start: 2024-09-10 | End: 2024-09-10

## 2024-09-10 RX ORDER — PROPOFOL 10 MG/ML
INJECTION, EMULSION INTRAVENOUS AS NEEDED
Status: DISCONTINUED | OUTPATIENT
Start: 2024-09-10 | End: 2024-09-10

## 2024-09-10 RX ADMIN — PROPOFOL 20 MG: 10 INJECTION, EMULSION INTRAVENOUS at 08:22

## 2024-09-10 RX ADMIN — PROPOFOL 20 MG: 10 INJECTION, EMULSION INTRAVENOUS at 08:25

## 2024-09-10 RX ADMIN — LIDOCAINE HYDROCHLORIDE 50 MG: 10 INJECTION, SOLUTION EPIDURAL; INFILTRATION; INTRACAUDAL; PERINEURAL at 08:18

## 2024-09-10 RX ADMIN — PROPOFOL 20 MG: 10 INJECTION, EMULSION INTRAVENOUS at 08:19

## 2024-09-10 RX ADMIN — PROPOFOL 80 MG: 10 INJECTION, EMULSION INTRAVENOUS at 08:18

## 2024-09-10 RX ADMIN — SODIUM CHLORIDE, SODIUM LACTATE, POTASSIUM CHLORIDE, AND CALCIUM CHLORIDE: .6; .31; .03; .02 INJECTION, SOLUTION INTRAVENOUS at 08:15

## 2024-09-10 NOTE — ANESTHESIA PREPROCEDURE EVALUATION
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         Additional Monitors:     Airway Plan:            Plan Factors-Exercise tolerance (METS): >4 METS.    Chart reviewed.   Existing labs reviewed. Patient summary reviewed.                  Induction- intravenous.    Postoperative Plan-         Informed Consent- Anesthetic plan and risks discussed with patient.  I personally reviewed this patient with the CRNA. Discussed and agreed on the Anesthesia Plan with the CRNA..

## 2024-09-10 NOTE — INTERVAL H&P NOTE
H&P reviewed. After examining the patient I find no changes in the patients condition since the H&P had been written.    Vitals:    09/10/24 0713   BP: 127/74   Pulse: 74   Resp: 18   Temp: 97.6 °F (36.4 °C)   SpO2: 95%

## 2024-09-10 NOTE — ANESTHESIA POSTPROCEDURE EVALUATION
Post-Op Assessment Note    CV Status:  Stable    Pain management: adequate       Mental Status:  Awake and sleepy   Hydration Status:  Euvolemic   PONV Controlled:  Controlled   Airway Patency:  Patent     Post Op Vitals Reviewed: Yes    No anethesia notable event occurred.    Staff: Anesthesiologist               /71 (09/10/24 0830)    Temp (!) 97.4 °F (36.3 °C) (09/10/24 0830)    Pulse 70 (09/10/24 0830)   Resp 18 (09/10/24 0830)    SpO2 95 % (09/10/24 0830)

## 2024-09-10 NOTE — H&P
History and Physical -  [facility]  Jeovanny Monsalve 73 y.o. male MRN: [id number]                  HPI: Jeovanny Monsalve is a 73 y.o. year old male who presents for colonoscopy for colon cancer screening.  Last colonoscopy more than 10 years ago      REVIEW OF SYSTEMS: Per the HPI, and otherwise unremarkable.    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      Past Surgical History:   Procedure Laterality Date    ATHERECTOMY      1 with stent placement  last assessed 6/17/14    CARDIAC CATHETERIZATION      outcome: successful  last assessed 6/17/14    CARDIAC CATHETERIZATION Left 09/06/2023    Procedure: Cardiac Left Heart Cath;  Surgeon: Татьяна Mendoza MD;  Location: [facility];  Service: Cardiology    CARDIAC CATHETERIZATION N/A 09/06/2023    Procedure: Cardiac Coronary Angiogram;  Surgeon: Татьяна Mendoza MD;  Location: [facility];  Service: Cardiology    CARDIAC CATHETERIZATION  09/06/2023    Procedure: Cardiac catheterization;  Surgeon: Татьяна Mendoza MD;  Location: [facility];  Service: Cardiology    CORONARY ANGIOPLASTY      CORONARY ANGIOPLASTY WITH STENT PLACEMENT      to LAD, diagonal and RCA    CORONARY ARTERY BYPASS GRAFT      MALIGNANT SKIN LESION EXCISION  01/09/2012    Trunk,  SCC chest     [place] CORONARY ARTERY BYP W/VEIN & ARTERY GRAFT 3 VEIN N/A 09/20/2023    Procedure: CORONARY ARTERY BYPASS GRAFT (CABG) X2 VESSELS, LIMA - LAD,  LEFT LEG EVH - PDA , W/ CALOS;  Surgeon: ANIKA Christianson MD;  Location: [facility]  OR;  Service: Cardiac Surgery     Social History   Social History     Substance and Sexual Activity   Alcohol Use Yes    Alcohol/week: 1.0 standard drink of alcohol    Types: 1 Glasses of wine per week    Comment: Wine consumption (__glasses/day)      Social History     Substance and Sexual Activity   Drug Use No     Social History     Tobacco Use   Smoking Status Never   Smokeless Tobacco Never     Family History   Problem Relation Age of Onset    Heart attack Father 48    Cancer Father     Heart disease Father         Heart attack    Coronary artery disease Family     Dementia Mother     Heart disease Brother         Bypass surgery    Cancer Brother          of cancer       Meds/Allergies     Not in a hospital admission.    No Known Allergies    Objective     There were no vitals taken for this visit.      PHYSICAL EXAM    Gen: NAD  CV: RRR  CHEST: Clear  ABD: soft, NT/ND  EXT: no edema  Neuro: AAO      ASSESSMENT/PLAN:  This is a 73 y.o. year old male here for colon cancer screening    PLAN:   Procedure: Colonoscopy

## 2024-11-04 ENCOUNTER — OFFICE VISIT (OUTPATIENT)
Age: 74
End: 2024-11-04
Payer: MEDICARE

## 2024-11-04 VITALS
BODY MASS INDEX: 33.5 KG/M2 | RESPIRATION RATE: 20 BRPM | SYSTOLIC BLOOD PRESSURE: 128 MMHG | WEIGHT: 234 LBS | OXYGEN SATURATION: 98 % | DIASTOLIC BLOOD PRESSURE: 80 MMHG | HEIGHT: 70 IN | TEMPERATURE: 97.7 F | HEART RATE: 72 BPM

## 2024-11-04 DIAGNOSIS — D22.9 NEVUS: ICD-10-CM

## 2024-11-04 DIAGNOSIS — D18.01 CHERRY ANGIOMA: ICD-10-CM

## 2024-11-04 DIAGNOSIS — L57.0 ACTINIC KERATOSIS: ICD-10-CM

## 2024-11-04 DIAGNOSIS — Z85.828 HISTORY OF SKIN CANCER: ICD-10-CM

## 2024-11-04 DIAGNOSIS — Z13.89 SCREENING FOR SKIN CONDITION: Primary | ICD-10-CM

## 2024-11-04 DIAGNOSIS — L82.1 SEBORRHEIC KERATOSIS: ICD-10-CM

## 2024-11-04 PROCEDURE — 99214 OFFICE O/P EST MOD 30 MIN: CPT | Performed by: DERMATOLOGY

## 2024-11-04 PROCEDURE — 17003 DESTRUCT PREMALG LES 2-14: CPT | Performed by: DERMATOLOGY

## 2024-11-04 PROCEDURE — 17000 DESTRUCT PREMALG LESION: CPT | Performed by: DERMATOLOGY

## 2024-11-04 NOTE — PATIENT INSTRUCTIONS
"MELANOCYTIC NEVI (\"Moles\")    Paste patient specific assessment and plan here *    Melanocytic nevi (\"moles\") are tan or brown, raised or flat areas of the skin which have an increased number of melanocytes. Melanocytes are the cells in our body which make pigment and account for skin color.    Some moles are present at birth (I.e., \"congenital nevi\"), while others come up later in life (i.e., \"acquired nevi\").  The sun can stimulate the body to make more moles.  Sunburns are not the only thing that triggers more moles.  Chronic sun exposure can do it too.     Clinically distinguishing a healthy mole from melanoma may be difficult, even for experienced dermatologists. The \"ABCDE's\" of moles have been suggested as a means of helping to alert a person to a suspicious mole and the possible increased risk of melanoma.  The suggestions for raising alert are as follows:    Asymmetry: Healthy moles tend to be symmetric, while melanomas are often asymmetric.  Asymmetry means if you draw a line through the mole, the two halves do not match in color, size, shape, or surface texture. Asymmetry can be a result of rapid enlargement of a mole, the development of a raised area on a previously flat lesion, scaling, ulceration, bleeding or scabbing within the mole.  Any mole that starts to demonstrate \"asymmetry\" should be examined promptly by a board certified dermatologist.     Border: Healthy moles tend to have discrete, even borders.  The border of a melanoma often blends into the normal skin and does not sharply delineate the mole from normal skin.  Any mole that starts to demonstrate \"uneven borders\" should be examined promptly by a board certified dermatologist.     Color: Healthy moles tend to be one color throughout.  Melanomas tend to be made up of different colors ranging from dark black, blue, white, or red.  Any mole that demonstrates a color change should be examined promptly by a board certified dermatologist. " Preoperative Diagnosis: Degenerative medial meniscal tear left knee    Postoperative Diagnosis: Same    Procedure: Arthroscopic partial medial meniscectomy    Surgeon: Esteban Wills MD    Anesthesia: General    Estimated Blood Loss: None    Total Tourniquet Time: None    Complications: None    Specimen: None      Operative Description: After the satisfactory induction of a general anesthetic with the patient in the supine position the left knee was sterilely prepped and draped.  No tourniquet inflation occurred during this case.  A timeout was held.    An anteromedial and anterolateral left knee arthroscopy portal was each infiltrated with Xylocaine with epinephrine before being created with an 11 blade.  The arthroscope was introduced through the anterolateral portal and a probe was introduced through the anteromedial portal.  The contents knee were then examined.    Arthroscopic Findings: Throughout the knee the synovium was normal.  No loose bodies were seen.  The patella was centrally located over the trochlear groove.  Grade 2 DJD was noted on both sides of the patellofemoral joint.  Grade 3 and 4 DJD was seen on the medial femoral condyle, and grade 3 DJD on the medial tibial plateau.  No DJD was present in the lateral compartment.  The lateral meniscus and the intra-crucial ligament was both present and intact.  The medial meniscus had a complex tear involving the body and posterior horn of the meniscus.    Arthroscopic Intervention: Using appropriate his mentation included manual instruments, a shaver and an ablator, a partial medial meniscectomy was performed.  Nearly all of the posterior horn was excised back to a stable rim.  The resection margin was tapered through the body of the meniscus.  The knee was then copiously irrigated to remove all debris.  30 mL of Carbocaine were placed in the knee for postoperative analgesia.  All his mentation was removed from the knee and both portals were closed with  "    Diameter: Healthy moles tend to be smaller than 0.6 cm in size; an exception are \"congenital nevi\" that can be larger.  Melanomas tend to grow and can often be greater than 0.6 cm (1/4 of an inch, or the size of a pencil eraser). This is only a guideline, and many normal moles may be larger than 0.6 cm without being unhealthy.  Any mole that starts to change in size (small to bigger or bigger to smaller) should be examined promptly by a board certified dermatologist.     Evolving: Healthy moles tend to \"stay the same.\"  Melanomas may often show signs of change or evolution such as a change in size, shape, color, or elevation.  Any mole that starts to itch, bleed, crust, burn, hurt, or ulcerate or demonstrate a change or evolution should be examined promptly by a board certified dermatologist.      Dysplastic Nevi  Dysplastic moles are moles that fit the ABCDE rules of melanoma but are not identified as melanomas when examined under the microscope.  They may indicate an increased risk of melanoma in that person. If there is a family history of melanoma, most experts agree that the person may be at an increased risk for developing a melanoma.  Experts still do not agree on what dysplastic moles mean in patients without a personal or family history of melanoma.  Dysplastic moles are usually larger than common moles and have different colors within it with irregular borders. The appearance can be very similar to a melanoma. Biopsies of dysplastic moles may show abnormalities which are different from a regular mole.      Melanoma  Malignant melanoma is a type of skin cancer that can be deadly if it spreads throughout the body. The incidence of melanoma in the [place] is growing faster than any other cancer. Melanoma usually grows near the surface of the skin for a period of time, and then begins to grow deeper into the skin. Once it grows deeper into the skin, the risk of spread to other organs greatly " nylon sutures.  2 x 2's and Ace wrap are applied.  The patient was uneventfully awakened and left the OR in stable condition having tolerated the procedure well.  There were no complications.  The estimate blood loss was none.   "increases. Therefore, early detection and removal of a malignant melanoma may result in a better chance at a complete cure; removal after the tumor has spread may not be as effective, leading to worse clinical outcomes such as death.    The true rate of nevus transformation into a melanoma is unknown. It has been estimated that the lifetime risk for any acquired melanocytic nevus on any 20-year-old individual transforming into melanoma by age 80 is 0.03% (1 in 3,164) for men and 0.009% (1 in 10,800) for women.     The appearance of a \"new mole\" remains one of the most reliable methods for identifying a malignant melanoma.  Occasionally, melanomas appear as rapidly growing, blue-black, dome-shaped bumps within a previous mole or previous area of normal skin.  Other times, melanomas are suspected when a mole suddenly appears or changes. Itching, burning, or pain in a pigmented lesion should increase suspicion, but most patients with early melanoma have no skin discomfort whatsoever.  Melanoma can occur anywhere on the skin, including areas that are difficult for self-examination. Many melanomas are first noticed by other family members.  Suspicious-looking moles may be removed for microscopic examination.       You may be able to prevent death from melanoma by doing two simple things:    Try to avoid unnecessary sun exposure and protect your skin when it is exposed to the sun.  People who live near the equator, people who have intermittent exposures to large amounts of sun, and people who have had sunburns in childhood or adolescence have an increased risk for melanoma. Sun sense and vigilant sun protection may be keys to helping to prevent melanoma.  We recommend wearing UPF-rated sun protective clothing and sunglasses whenever possible and applying a moisturizer-sunscreen combination product (SPF 50+) such as Neutrogena Daily Defense to sun exposed areas of skin at least three times a day.    Have your moles " "regularly examined by a board certified dermatologist AND by yourself or a family member/friend at home.  We recommend that you have your moles examined at least once a year by a board certified dermatologist.  Use your birthday as an annual reminder to have your \"Birthday Suit\" (I.e., your skin) examined; it is a nice birthday gift to yourself to know that your skin is healthy appearing!  Additionally, at-home self examinations may be helpful for detecting a possible melanoma.  Use the ABCDEs we discussed and check your moles once a month at home.        SEBORRHEIC KERATOSIS  A seborrheic keratosis is a harmless warty spot that appears during adult life as a common sign of skin aging.  Seborrheic keratoses can arise on any area of skin, covered or uncovered, with the usual exception of the palms and soles. They do not arise from mucous membranes. Seborrheic keratoses can have highly variable appearance.      Seborrheic keratoses are extremely common. It has been estimated that over 90% of adults over the age of 60 years have one or more of them. They occur in males and females of all races, typically beginning to erupt in the 30s or 40s. They are uncommon under the age of 20 years.  The precise cause of seborrhoeic keratoses is not known.  Seborrhoeic keratoses are considered degenerative in nature. As time goes by, seborrheic keratoses tend to become more numerous. Some people inherit a tendency to develop a very large number of them; some people may have hundreds of them.    The name \"seborrheic keratosis\" is misleading, because these lesions are not limited to a seborrhoeic distribution (scalp, mid-face, chest, upper back), nor are they formed from sebaceous glands, nor are they associated with sebum -- which is greasy.  Seborrheic keratosis may also be called \"SK,\" \"Seb K,\" \"basal cell papilloma,\" \"senile wart,\" or \"barnacle.\"      There is no easy way to remove multiple lesions on a single occasion.  Unless a " "specific lesion is \"inflamed\" and is causing pain or stinging/burning or is bleeding, most insurance companies do not authorize treatment.      ANGIOMA (\"CHERRY ANGIOMA\")  Coppola angiomas markedly increase in number from about the age of 40, so it has been estimated that 75% of people over 75 years of age have them. Although they also called \"senile angiomas,\" they can occur in young people too - 5% of adolescents have been found to have them.     Cherry angiomas are very common in males and females of any age or race, with no difference in sexes or races affected. They are however more noticeable in white skin than in skin of colour.  There may be a family history of similar lesions. Eruptive (very large number appearing in a short period of time) cherry angiomas have been rarely reported to be associated with internal malignancy and pregnancy.   Treatment with Cryotherapy    The doctor has treated your skin with nitrogen, which is 320 degrees Fahrenheit below zero.  He has given the treated area \"frostbite.\"    Stinging should subside within a few hours.  You can take Tylenol for pain, if needed.    Over the next few days, it is normal if the area becomes reddened, a blood blister, or swollen with fluid.  If the lesion treated was near the eye - you could get a swollen eye over the next few days.  Do not panic!  This is all temporary, and will resolve with time.    There is no special treatment - just keep the area clean.  Makeup and BandAids can be used, if preferred.    When the area starts to dry up and peel off, using Vaseline can help healing.    It usually takes up to a month for it to heal.  Some lesions are recurrent and may require repeat treatments.  If a lesion has not healed in one month, please don't hesitate to contact us.      If you have any further questions that are not answered here, please call us.  [phone].    Thank you for allowing us to care for you.      "

## 2024-11-04 NOTE — PROGRESS NOTES
"[facility]ermatology [facility] Note     Patient Name: Jeovanny Monsalve  Encounter Date: 11/04/2024     Have you been cared for by a [facility] Dermatologist in the last 3 years and, if so, which description applies to you?    Yes.  I have been here within the last 3 years, and my medical history has NOT changed since that time.  I am MALE/not capable of bearing children.    REVIEW OF SYSTEMS:  Have you recently had or currently have any of the following? No changes in my recent health.   PAST MEDICAL HISTORY:  Have you personally ever had or currently have any of the following?  If \"YES,\" then please provide more detail. No changes in my medical history.   HISTORY OF IMMUNOSUPPRESSION: Do you have a history of any of the following:  Systemic Immunosuppression such as Diabetes, Biologic or Immunotherapy, Chemotherapy, Organ Transplantation, Bone Marrow Transplantation or Prednisone?  No     Answering \"YES\" requires the addition of the dotphrase \"IMMUNOSUPPRESSED\" as the first diagnosis of the patient's visit.   FAMILY HISTORY:  Any \"first degree relatives\" (parent, brother, sister, or child) with the following?    No changes in my family's known health.   PATIENT EXPERIENCE:    Do you want the Dermatologist to perform a COMPLETE skin exam today including a clinical examination under the \"bra and underwear\" areas?  Yes  If necessary, do we have your permission to call and leave a detailed message on your Preferred Phone number that includes your specific medical information?  Yes      No Known Allergies   Current Outpatient Medications:     aspirin 81 MG tablet, Take 1 tablet by mouth daily, Disp: , Rfl:     atorvastatin (LIPITOR) 80 mg tablet, TAKE 1 TABLET BY MOUTH DAILY WITH DINNER, Disp: 30 tablet, Rfl: 0    metoprolol succinate (TOPROL-XL) 25 mg 24 hr tablet, Take 1 tablet (25 mg total) by mouth daily, Disp: 90 tablet, Rfl: 1    tamsulosin (FLOMAX) 0.4 mg, TAKE 2 CAPSULES BY MOUTH DAILY WITH DINNER.., Disp: 200 " "capsule, Rfl: 1          Whom besides the patient is providing clinical information about today's encounter?   NO ADDITIONAL HISTORIAN (patient alone provided history)    Physical Exam and Assessment/Plan by Diagnosis:       Chief Complaint   Patient presents with    Follow-up     Skin exam, left dorsum of the hand history scc        HISTORY OF SQUAMOUS CELL CARCINOMA      Physical Exam:  Anatomic Location Affected:  left back in 10/2022  Morphological Description of Scar:  scar well healed  Suspected Recurrence: no  Regional adenopathy: no     Additional History of Present Condition:  history of squamous cell carcinoma with no sign of recurrence.      Assessment and Plan:  Based on a thorough discussion of this condition and the management approach to it (including a comprehensive discussion of the known risks, side effects and potential benefits of treatment), the patient (family) agrees to implement the following specific plan:  Monitor for change  Sun avoidance, protective clothing (known as UPF clothing), and the use of at least SPF 30 sunscreens is advised. Sunscreen should be reapplied every two hours when outside.        MELANOCYTIC NEVI (\"Moles\")    Physical Exam:  Anatomic Location Affected: Mostly on sun-exposed areas of the trunk and extremities   Morphological Description:  Scattered, 1-4mm round to ovoid, symmetrical-appearing, even bordered, skin colored to dark brown macules/papules, mostly in sun-exposed areas  Pertinent Positives:  Pertinent Negatives:    Additional History of Present Condition:  present on exam     Assessment and Plan:  Based on a thorough discussion of this condition and the management approach to it (including a comprehensive discussion of the known risks, side effects and potential benefits of treatment), the patient (family) agrees to implement the following specific plan:  Provided handout with information regarding the ABCDE's of moles   Recommend routine skin exams every " "year   Sun avoidance, protective clothing (known as UPF clothing), and the use of at least SPF 30 sunscreens is advised. Sunscreen should be reapplied every two hours when outside.       SEBORRHEIC KERATOSIS; NON-INFLAMED    Physical Exam:  Anatomic Location Affected:  scattered across trunk, extremities,  face  Morphological Description:  Flat and raised, waxy, smooth to warty textured, yellow to brownish-grey to dark brown to blackish, discrete, \"stuck-on\" appearing papules.  Pertinent Positives:  Pertinent Negatives:    Additional History of Present Condition:  Patient reports new bumps on the skin.  Denies itch, burn, pain, bleeding or ulceration.  Present constantly; nothing seems to make it worse or better.  No prior treatment.      Assessment and Plan:  Based on a thorough discussion of this condition and the management approach to it (including a comprehensive discussion of the known risks, side effects and potential benefits of treatment), the patient (family) agrees to implement the following specific plan:  Reassured benign        ANGIOMA (\"CHERRY ANGIOMA\")    Physical Exam:  Anatomic Location: scattered across sun exposed areas of the trunk and extremities   Morphologic Description: Firm red to reddish-blue discrete papules  Pertinent Positives:  Pertinent Negatives:    Additional History of Present Condition:  Present on exam.     Assessment and Plan:  Reassured benign  ACTINIC KERATOSIS WITH CRYOSURGERY PROCEDURE    ACTINIC KERATOSIS    Physical Exam:  Anatomic Location Affected:  face , scalp and Left dorsum of the hand  Morphological Description:  scaling papules    Additional History of Present Condition:  noted on exam  Physical Exam  Constitutional:        HENT:      Head:           Assessment and Plan:  Based on a thorough discussion of this condition and the management approach to it (including a comprehensive discussion of the known risks, side effects and potential benefits of treatment), the " patient (family) agrees to implement the following specific plan:    Cryotherapy     Actinic keratoses are very common on sites repeatedly exposed to the sun, especially the backs of the hands and the face.  They are considered precancers and have a low risk of turning into squamous cell carcinoma. It is rare for a solitary actinic keratosis to evolve into a squamous cell carcinoma (SCC), but the risk is 10-15% when more than 10 actinic keratoses are present. A tender, thickened, ulcerated or enlarging actinic keratosis is suspicious of SCC.    Actinic keratoses may be prevented by strict sun protection. If already present, keratoses may improve with a very high sun protection factor (50+) broad-spectrum sunscreen applied at least daily to affected areas, year-round.  We recommend that sun protective clothing and hats and sunglasses be worn whenever possible.  Note that you can make you own UPF 30 rate clothing using just your own washing machine with a product called sun guard    There are several different options for treating actinic keratoses    Topical “medications such as 5-fluorouracil or Aldara  - good for field treatment ie treats what's seen and not seen    Cryotherapy - good for single spots but treats “only what we see” versus a field treatment    Photodynamic therapy - involves application of a light sensitizing medicine and then exposure to a special light, also a good field treatment        PROCEDURE:  DESTRUCTION OF PRE-MALIGNANT LESIONS  After a thorough discussion of treatment options and risk/benefits/alternatives (including but not limited to local pain, scarring, dyspigmentation, blistering, and possible superinfection), verbal and written consent were obtained and the aforementioned lesions were treated on with cryotherapy using liquid nitrogen x 1 cycle for 5-10 seconds.    TOTAL NUMBER of 3 pre-malignant lesions were treated today on the ANATOMIC LOCATION: face, scalp and dorsum of the Left  hand.     The patient tolerated the procedure well, and after-care instructions were provided.               Scribe Attestation      I,:  Michel Mason MA am acting as a scribe while in the presence of the attending physician.:       I,:  Mani Hauser MD personally performed the services described in this documentation    as scribed in my presence.:

## 2024-11-14 DIAGNOSIS — I25.10 CORONARY ARTERY DISEASE INVOLVING NATIVE CORONARY ARTERY OF NATIVE HEART WITHOUT ANGINA PECTORIS: ICD-10-CM

## 2024-11-14 DIAGNOSIS — I25.5 ISCHEMIC CARDIOMYOPATHY: ICD-10-CM

## 2024-11-14 RX ORDER — METOPROLOL SUCCINATE 25 MG/1
25 TABLET, EXTENDED RELEASE ORAL DAILY
Qty: 90 TABLET | Refills: 0 | Status: SHIPPED | OUTPATIENT
Start: 2024-11-14

## 2024-12-05 ENCOUNTER — TELEPHONE (OUTPATIENT)
Age: 74
End: 2024-12-05

## 2024-12-06 DIAGNOSIS — Z95.1 S/P CABG X 2: ICD-10-CM

## 2024-12-06 DIAGNOSIS — I25.10 CORONARY ARTERY DISEASE INVOLVING NATIVE CORONARY ARTERY OF NATIVE HEART WITHOUT ANGINA PECTORIS: ICD-10-CM

## 2024-12-06 DIAGNOSIS — I48.0 PAROXYSMAL A-FIB (HCC): ICD-10-CM

## 2024-12-06 RX ORDER — ATORVASTATIN CALCIUM 80 MG/1
80 TABLET, FILM COATED ORAL
Qty: 90 TABLET | Refills: 1 | Status: SHIPPED | OUTPATIENT
Start: 2024-12-06

## 2024-12-06 NOTE — TELEPHONE ENCOUNTER
Refill must be reviewed and completed by the office or provider. The refill is unable to be approved or denied by the medication management team.    Courtesy refill previously given 08.2024, patient need updated blood work.  Request for 90D supply - Please review to see if the refill is appropriate.

## (undated) DEVICE — PLUMEPEN PRO 10FT

## (undated) DEVICE — GLIDESHEATH SLENDER STAINLESS STEEL KIT: Brand: GLIDESHEATH SLENDER

## (undated) DEVICE — SYRINGE 50ML LL

## (undated) DEVICE — VASOVIEW HEMOPRO 2: Brand: VASOVIEW HEMOPRO 2

## (undated) DEVICE — SILVER-COATED ANTIBACTERIAL BARRIER DRESSING: Brand: ACTICOAT SURGIC 10X25CM 5PK US

## (undated) DEVICE — PUMP TUBING FUSION PACK

## (undated) DEVICE — SUT PROLENE 4-0 BB 36 IN 8581H

## (undated) DEVICE — TRAY FOLEY 16FR SURESTEP TEMP SENS URIMETER STAT LOK

## (undated) DEVICE — SUT PDS PLUS 1 CTB 36 IN PDPB359T

## (undated) DEVICE — SUT SILK 2 60 IN SA8H

## (undated) DEVICE — Device: Brand: RETRACT-O-TAPE 18G X 30.5CM BLUNT NEEDLE

## (undated) DEVICE — SILVER-COATED ANTIBACTERIAL BARRIER DRESSING: Brand: ACTICOAT SURGIC 10X12CM 5PK US

## (undated) DEVICE — INTENDED FOR TISSUE SEPARATION, AND OTHER PROCEDURES THAT REQUIRE A SHARP SURGICAL BLADE TO PUNCTURE OR CUT.: Brand: BARD-PARKER ® CARBON RIB-BACK BLADES

## (undated) DEVICE — SUT PROLENE 7-0 BV-1/BV-1 24 IN 8304H

## (undated) DEVICE — AORTIC PUNCH 5.2 MM DISP

## (undated) DEVICE — RED RUBBER URETHRAL CATHETER: Brand: DOVER

## (undated) DEVICE — SUT ETHIBOND 2-0 SH/SH 36 IN X523H

## (undated) DEVICE — SUT SILK 0 CT-1 30 IN 424H

## (undated) DEVICE — ADHESIVE SKIN HIGH VISCOSITY EXOFIN 1ML

## (undated) DEVICE — ANTIBACTERIAL UNDYED BRAIDED (POLYGLACTIN 910), SYNTHETIC ABSORBABLE SUTURE: Brand: COATED VICRYL

## (undated) DEVICE — PACK CUSTOM PERFUSION PLEG PK

## (undated) DEVICE — ALCON OPHTHALMIC KNIFE 15 °: Brand: ALCON

## (undated) DEVICE — DRESSING ALLEVYN LIFE HEEL 25 X 25.2CM

## (undated) DEVICE — SUT VICRYL PLUS 1 CTB-1 36 IN VCPB947H

## (undated) DEVICE — THERMOFLECT BLANKET, L, 25EA                               TS THERMOFLECT BLANKET, 48" X 84", SILVER, 5/BG, 5 BG/CS NW: Brand: THERMOFLECT

## (undated) DEVICE — SUT MONOCRYL 4-0 PS-2 18 IN Y496G

## (undated) DEVICE — BLANKET HYPOTHERMIA ADULT GAYMAR

## (undated) DEVICE — SPONGE 4 X 4 XRAY 16 PLY STRL LF RFD

## (undated) DEVICE — HEMOCLIP CARTRIDGE LRG

## (undated) DEVICE — GLOVE INDICATOR PI UNDERGLOVE SZ 8 BLUE

## (undated) DEVICE — STERNAL WIRE

## (undated) DEVICE — PACK CABG PBDS

## (undated) DEVICE — ACE WRAP 6 IN UNSTERILE

## (undated) DEVICE — RADIFOCUS OPTITORQUE ANGIOGRAPHIC CATHETER: Brand: OPTITORQUE

## (undated) DEVICE — PLEDGET CARDIO PTFE 9.5 X 4.8 SOFT LF (6EA/PK)

## (undated) DEVICE — BONE WAX WHITE: Brand: BONE WAX WHITE

## (undated) DEVICE — GAUZE SPONGES,16 PLY: Brand: CURITY

## (undated) DEVICE — EVERGRIP INSERT SET 86MM: Brand: FOGARTY EVERGRIP

## (undated) DEVICE — PENCIL ELECTROSURG E-Z CLEAN -0035H

## (undated) DEVICE — ELECTRODE BLADE E-Z CLEAN 4IN -0014A

## (undated) DEVICE — 40601 PROLONGED POSITIONING SYSTEM: Brand: 40601 PROLONGED POSITIONING SYSTEM

## (undated) DEVICE — OASIS DRAIN, SINGLE, INLINE & ATS COMPATIBLE: Brand: OASIS

## (undated) DEVICE — AIRLIFE™ TRI-FLO™ SUCTION CATHETER: Brand: AIRLIFE™

## (undated) DEVICE — INTENDED FOR TISSUE SEPARATION, AND OTHER PROCEDURES THAT REQUIRE A SHARP SURGICAL BLADE TO PUNCTURE OR CUT.: Brand: BARD-PARKER SAFETY BLADES SIZE 15, STERILE

## (undated) DEVICE — BLADE BEAVER MINI SZ 69

## (undated) DEVICE — LIGHT HANDLE COVER SLEEVE DISP BLUE STELLAR

## (undated) DEVICE — SUT MONOCRYL PLUS 3-0 PS-2 27 IN MCP427H

## (undated) DEVICE — 32 FR RIGHT ANGLE – SOFT PVC CATHETER: Brand: PVC THORACIC CATHETERS

## (undated) DEVICE — SUT PROLENE 5-0 C-1/C-1 36 IN 8321H

## (undated) DEVICE — TUBING INSUFFLATION SET ISO CONNECTOR

## (undated) DEVICE — PACK CUSTOM PERFUSION ANH

## (undated) DEVICE — RECIP.STERNUM SAW BLADE 34/7.5/0.7MM: Brand: AESCULAP

## (undated) DEVICE — 32 FR STRAIGHT – SOFT PVC CATHETER: Brand: PVC THORACIC CATHETERS

## (undated) DEVICE — SUT ETHIBOND 2-0 SH-1/SH-1 30 IN X763H

## (undated) DEVICE — 3000CC GUARDIAN II: Brand: GUARDIAN

## (undated) DEVICE — SUT SILK 2-0 SH CR/8 18 IN C012D

## (undated) DEVICE — DGW .035 FC J3MM 260CM TEF: Brand: EMERALD

## (undated) DEVICE — LIGACLIP MCA MULTIPLE CLIP APPLIERS, 20 SMALL CLIPS: Brand: LIGACLIP

## (undated) DEVICE — GLOVE SRG BIOGEL ECLIPSE 8

## (undated) DEVICE — FILTER SMOKE EVAC VIROSAFE

## (undated) DEVICE — SUT PROLENE 7-0 BV175-8/BV175-8 24 IN EPM8747